# Patient Record
Sex: FEMALE | Race: AMERICAN INDIAN OR ALASKA NATIVE | NOT HISPANIC OR LATINO | URBAN - METROPOLITAN AREA
[De-identification: names, ages, dates, MRNs, and addresses within clinical notes are randomized per-mention and may not be internally consistent; named-entity substitution may affect disease eponyms.]

---

## 2017-01-13 ENCOUNTER — OFFICE VISIT (OUTPATIENT)
Dept: ENDOCRINOLOGY | Facility: CLINIC | Age: 42
End: 2017-01-13

## 2017-01-13 VITALS
HEART RATE: 86 BPM | OXYGEN SATURATION: 96 % | SYSTOLIC BLOOD PRESSURE: 147 MMHG | BODY MASS INDEX: 42.4 KG/M2 | DIASTOLIC BLOOD PRESSURE: 96 MMHG | TEMPERATURE: 98.4 F | WEIGHT: 239.3 LBS | HEIGHT: 63 IN

## 2017-01-13 DIAGNOSIS — E66.01 MORBID OBESITY DUE TO EXCESS CALORIES (H): Primary | ICD-10-CM

## 2017-01-13 ASSESSMENT — ENCOUNTER SYMPTOMS
SPUTUM PRODUCTION: 1
HEMOPTYSIS: 0
DYSPNEA ON EXERTION: 1
SHORTNESS OF BREATH: 0
WHEEZING: 0
RESPIRATORY PAIN: 0
POSTURAL DYSPNEA: 0
COUGH DISTURBING SLEEP: 1
SNORES LOUDLY: 1
COUGH: 1

## 2017-01-13 ASSESSMENT — PAIN SCALES - GENERAL: PAINLEVEL: NO PAIN (0)

## 2017-01-13 NOTE — PROGRESS NOTES
"    Return Medical Weight Management Note     Stephanie Mccarthy  MRN:  [id number]  :  1975  YAHAIRA:  2017    Dear Colleagues,    I had the pleasure of seeing your patient Stephanie Mccarthy.  She is a 40 year old female who I am continuing to see for treatment of obesity related to:Hyperlipidemia, Sleep Apnea (has CPAP and does not use), Knee Pain, GERD and Depression.    CURRENT WEIGHT:   239 lbs 4.8 oz    Wt Readings from Last 4 Encounters:   17 108.546 kg (239 lb 4.8 oz)   10/07/16 107.049 kg (236 lb)   16 104.055 kg (229 lb 6.4 oz)   12/14/15 102.74 kg (226 lb 8 oz)     Height:  5' 3\"  Body Mass Index:  Body mass index is 42.4 kg/(m^2).  Vitals:  B/P: 127/82, P: 77    Initial consult weight was 264 on 14  Weight change since last seen on 10/17/2016 is up 3 pounds.  Total loss is 25 pounds.    INTERVAL HISTORY:  Still stressed and unable to get activity. She feels med is maybe working and is helpful with food deterrence, no side effects. Even so, has not been consistently on her food plan. Feels wants today to prioritize her plan adherence rather than increasing medication.    Diet and Activity Changes Since Last Visit Reviewed With Patient 2017   I have made the following changes to my diet since my last visit: less dairy increase leafy green   With regards to my diet, I am still struggling with: energy and headaches   For breakfast, I typically eat: coffee   For lunch, I typically eat: soup bread salad   For supper, I typically eat: meat and vegatables   For snack(s), I typically eat: chips   I have made the following changes to my activity/exercise since my last visit: none   With regards to my activity/exercise, I am still struggling with: time - family balance     MEDICATIONS:   Current Outpatient Prescriptions   Medication     Phentermine-Topiramate 15-92 MG CP24     SPIRONOLACTONE PO     albuterol (PROAIR HFA, PROVENTIL HFA, VENTOLIN HFA) 108 (90 BASE) MCG/ACT inhaler     No " current facility-administered medications for this visit.       Weight Loss Medication History Reviewed With Patient 1/13/2017   Which weight loss medications are you currently taking on a regular basis?  Qysmia (phentermine/topiramate)   Are you having any side effects from the weight loss medication that we have prescribed you? No     ASSESSMENT:   Continue current plan - 3 meal/d, no snacks, reduced starches and cheese supported by current (full) dose of Qsymia. If not successful next time, may need to separate phentermine and topiramate to increase doses.    FOLLOW-UP:    12 weeks.  10/15 minutes spent on counseling and education     Sincerely,    Rober Nam MD

## 2017-01-13 NOTE — Clinical Note
"2017       RE: Stephanie Mccarthy  [address] [facility] [address]     Dear Colleague,    Thank you for referring your patient, Stephanie Mccarthy, to the MEDICAL WEIGHT MANAGEMENT at [facility]. Please see a copy of my visit note below.        Return Medical Weight Management Note     Stephanie Mccarthy  MRN:  [id number]  :  1975  YAHAIRA:  2017    Dear Colleagues,    I had the pleasure of seeing your patient Stephanie Mccarthy.  She is a 40 year old female who I am continuing to see for treatment of obesity related to:Hyperlipidemia, Sleep Apnea (has CPAP and does not use), Knee Pain, GERD and Depression.    CURRENT WEIGHT:   239 lbs 4.8 oz    Wt Readings from Last 4 Encounters:   17 108.546 kg (239 lb 4.8 oz)   10/07/16 107.049 kg (236 lb)   16 104.055 kg (229 lb 6.4 oz)   12/14/15 102.74 kg (226 lb 8 oz)     Height:  5' 3\"  Body Mass Index:  Body mass index is 42.4 kg/(m^2).  Vitals:  B/P: 127/82, P: 77    Initial consult weight was 264 on 14  Weight change since last seen on 10/17/2016 is up 3 pounds.  Total loss is 25 pounds.    INTERVAL HISTORY:  Still stressed and unable to get activity. She feels med is maybe working and is helpful with food deterrence, no side effects. Even so, has not been consistently on her food plan. Feels wants today to prioritize her plan adherence rather than increasing medication.    Diet and Activity Changes Since Last Visit Reviewed With Patient 2017   I have made the following changes to my diet since my last visit: less dairy increase leafy green   With regards to my diet, I am still struggling with: energy and headaches   For breakfast, I typically eat: coffee   For lunch, I typically eat: soup bread salad   For supper, I typically eat: meat and vegatables   For snack(s), I typically eat: chips   I have made the following changes to my activity/exercise since my last visit: none   With regards " to my activity/exercise, I am still struggling with: time - family balance     MEDICATIONS:   Current Outpatient Prescriptions   Medication     Phentermine-Topiramate 15-92 MG CP24     SPIRONOLACTONE PO     albuterol (PROAIR HFA, PROVENTIL HFA, VENTOLIN HFA) 108 (90 BASE) MCG/ACT inhaler     No current facility-administered medications for this visit.       Weight Loss Medication History Reviewed With Patient 1/13/2017   Which weight loss medications are you currently taking on a regular basis?  Qysmia (phentermine/topiramate)   Are you having any side effects from the weight loss medication that we have prescribed you? No     ASSESSMENT:   Continue current plan - 3 meal/d, no snacks, reduced starches and cheese supported by current (full) dose of Qsymia. If not successful next time, may need to separate phentermine and topiramate to increase doses.    FOLLOW-UP:    12 weeks.  10/15 minutes spent on counseling and education     Sincerely,    Rober Nam MD

## 2017-01-13 NOTE — NURSING NOTE
"Chief Complaint   Patient presents with     Clinic Care Coordination - Follow-up       Filed Vitals:    01/13/17 0923   BP: 147/96   Pulse: 86   Temp: 98.4  F (36.9  C)   TempSrc: Oral   Height: 5' 3\"   Weight: 239 lb 4.8 oz   SpO2: 96%       Body mass index is 42.4 kg/(m^2).    Felisa MACK LPN                          "

## 2017-05-02 DIAGNOSIS — E66.01 MORBID OBESITY DUE TO EXCESS CALORIES (H): ICD-10-CM

## 2017-05-02 NOTE — TELEPHONE ENCOUNTER
Qsymia  Last Written Prescription Date:  10/7/16  Last Fill Quantity: 30,   # refills: 5  Last Office Visit : 1/13/17  Future Office visit:  5/15/17    Routing refill request to provider for review/approval because:  Drug not on the [facility], [facility] or [facility] refill protocol or controlled substance

## 2017-05-08 ENCOUNTER — OFFICE VISIT (OUTPATIENT)
Dept: OPTOMETRY | Facility: CLINIC | Age: 42
End: 2017-05-08
Payer: COMMERCIAL

## 2017-05-08 DIAGNOSIS — H52.202 HYPEROPIA OF LEFT EYE WITH ASTIGMATISM: ICD-10-CM

## 2017-05-08 DIAGNOSIS — Z98.890 S/P LASIK SURGERY: ICD-10-CM

## 2017-05-08 DIAGNOSIS — H52.11 MYOPIA OF RIGHT EYE: ICD-10-CM

## 2017-05-08 DIAGNOSIS — H52.02 HYPEROPIA OF LEFT EYE WITH ASTIGMATISM: ICD-10-CM

## 2017-05-08 DIAGNOSIS — H52.4 PRESBYOPIA: Primary | ICD-10-CM

## 2017-05-08 PROCEDURE — 92004 COMPRE OPH EXAM NEW PT 1/>: CPT | Performed by: OPTOMETRIST

## 2017-05-08 PROCEDURE — 92015 DETERMINE REFRACTIVE STATE: CPT | Performed by: OPTOMETRIST

## 2017-05-08 ASSESSMENT — VISUAL ACUITY
OS_SC: 20/20
OD_SC: 20/40
METHOD: SNELLEN - LINEAR
OD_SC: 20/20
OS_SC: 20/60

## 2017-05-08 ASSESSMENT — REFRACTION_MANIFEST
OS_ADD: +1.25
OD_ADD: +1.25
OS_CYLINDER: +0.25
OD_SPHERE: -0.25
OS_SPHERE: +0.25
OD_CYLINDER: SPHERE
OS_AXIS: 180

## 2017-05-08 ASSESSMENT — SLIT LAMP EXAM - LIDS
COMMENTS: NORMAL
COMMENTS: NORMAL

## 2017-05-08 ASSESSMENT — REFRACTION_WEARINGRX
OS_SPHERE: +0.25
OS_HPRISM: 3.0
OS_HBASE: OUT
OD_HBASE: OUT
OD_CYLINDER: SPHERE
OS_CYLINDER: SPHERE
OD_HPRISM: 3.0
OD_SPHERE: PLANO

## 2017-05-08 ASSESSMENT — EXTERNAL EXAM - LEFT EYE: OS_EXAM: NORMAL

## 2017-05-08 ASSESSMENT — CONF VISUAL FIELD
OD_NORMAL: 1
OS_NORMAL: 1

## 2017-05-08 ASSESSMENT — EXTERNAL EXAM - RIGHT EYE: OD_EXAM: NORMAL

## 2017-05-08 ASSESSMENT — TONOMETRY
OD_IOP_MMHG: 16
OS_IOP_MMHG: 16
IOP_METHOD: APPLANATION

## 2017-05-08 ASSESSMENT — CUP TO DISC RATIO
OD_RATIO: 0.1
OS_RATIO: 0.1

## 2017-05-08 NOTE — PROGRESS NOTES
Chief Complaint   Patient presents with     COMPREHENSIVE EYE EXAM      Accompanied by self  Last Eye Exam: 5 years ago  Dilated Previously: Yes    What are you currently using to see?  does not use glasses or contacts       Distance Vision Acuity: Noticed gradual change in both eyes    Near Vision Acuity: Not satisfied     Eye Comfort: good  Do you use eye drops? : No  Occupation or Hobbies: software tech    Radha Kosak, Optometric [employer]          Medical, surgical and family histories reviewed and updated 5/8/2017.       OBJECTIVE: See Ophthalmology exam    ASSESSMENT:    ICD-10-CM    1. Presbyopia H52.4 EYE EXAM (SIMPLE-NONBILLABLE)     REFRACTION   2. Myopia of right eye H52.11 EYE EXAM (SIMPLE-NONBILLABLE)     REFRACTION   3. Hyperopia of left eye with astigmatism H52.02 EYE EXAM (SIMPLE-NONBILLABLE)    H52.202 REFRACTION   4. S/P LASIK SURGERY 2006 Z98.890 EYE EXAM (SIMPLE-NONBILLABLE)      PLAN:   A final glasses prescription was given.  Allow time for adaptation to lenses.  You have the option of wearing reading glasses that would allow you to see up close (anytime you look far away, you have to take them off) or you can get a progressive lens or a lined bifocal, these would allow you to see clearly in the distance and close up.    Your ocular health was normal today, therefore no treatment other than glasses is recommended.    Return to clinic in 1 year for your next eye exam.      Corry Garcia O.D  [address]. [place]  [address]    [phone]

## 2017-05-08 NOTE — PATIENT INSTRUCTIONS
A final glasses prescription was given.  Allow time for adaptation to lenses.  You have the option of wearing reading glasses that would allow you to see up close (anytime you look far away, you have to take them off) or you can get a progressive lens or a lined bifocal, these would allow you to see clearly in the distance and close up.    Your ocular health was normal today, therefore no treatment other than glasses is recommended.    Return to clinic in 1 year for your next eye exam.      Corry Garcia O.D  [address]. [place]  [address]    [phone]

## 2017-05-08 NOTE — MR AVS SNAPSHOT
After Visit Summary   5/8/2017    Stephanie Mccarthy    MRN: [id number]           Patient Information     Date Of Birth          1975        Visit Information        Provider Department      5/8/2017 9:30 AM Corry Garcia OD [facility]        Today's Diagnoses     Presbyopia    -  1    Myopia of right eye        Hyperopia of left eye with astigmatism        S/P LASIK SURGERY 2006          Care Instructions    A final glasses prescription was given.  Allow time for adaptation to lenses.  You have the option of wearing reading glasses that would allow you to see up close (anytime you look far away, you have to take them off) or you can get a progressive lens or a lined bifocal, these would allow you to see clearly in the distance and close up.    Your ocular health was normal today, therefore no treatment other than glasses is recommended.    Return to clinic in 1 year for your next eye exam.      Corry Garcia O.D  [address] [place]  [address] [phone]                       Follow-ups after your visit        Follow-up notes from your care team     Return in about 1 year (around 5/8/2018) for Eye Exam.      Your next 10 appointments already scheduled     May 15, 2017  8:00 AM CDT   (Arrive by 7:45 AM)   Return Visit with Rober Nam MD   [facility] Medical Weight Management ([facility])    [address] [place] [id number] [phone]              Who to contact     If you have questions or need follow up information about today's clinic visit or your schedule please contact [facility] directly at [phone].  Normal or non-critical lab and imaging results will be communicated to you by MyChart, letter or phone within 4 business days after the clinic has received the results. If you do not hear from us within 7 days, please contact the clinic through  Fuzzt or phone. If you have a critical or abnormal lab result, we will notify you by phone as soon as possible.  Submit refill requests through Kera or call your pharmacy and they will forward the refill request to us. Please allow 3 business days for your refill to be completed.          Additional Information About Your Visit        Motion Displayshart Information     Kera gives you secure access to your electronic health record. If you see a primary care provider, you can also send messages to your care team and make appointments. If you have questions, please call your primary care clinic.  If you do not have a primary care provider, please call [phone] and they will assist you.        Care EveryWhere ID     This is your Care EveryWhere ID. This could be used by other organizations to access your [place] medical records  [id number]         Blood Pressure from Last 3 Encounters:   01/13/17 (!) 147/96   10/07/16 (!) 153/99   02/24/16 123/83    Weight from Last 3 Encounters:   01/13/17 108.5 kg (239 lb 4.8 oz)   10/07/16 107 kg (236 lb)   02/24/16 104.1 kg (229 lb 6.4 oz)              We Performed the Following     EYE EXAM (SIMPLE-NONBILLABLE)     REFRACTION        Primary Care Provider Office Phone #    [facility] [phone]       No address on file        Thank you!     Thank you for choosing [facility] [place]  for your care. Our goal is always to provide you with excellent care. Hearing back from our patients is one way we can continue to improve our services. Please take a few minutes to complete the written survey that you may receive in the mail after your visit with us. Thank you!             Your Updated Medication List - Protect others around you: Learn how to safely use, store and throw away your medicines at www.disposemymeds.org.          This list is accurate as of: 5/8/17 10:40 AM.  Always use your most recent med list.                   Brand Name Dispense Instructions for  use    albuterol 108 (90 BASE) MCG/ACT Inhaler    PROAIR HFA/PROVENTIL HFA/VENTOLIN HFA    1 Inhaler    Inhale 2 puffs into the lungs every 6 hours as needed for shortness of breath / dyspnea or wheezing       Phentermine-Topiramate 15-92 MG Cp24     30 capsule    Take 1 capsule by mouth daily       SPIRONOLACTONE PO      Take 100 mg by mouth daily

## 2017-05-15 ENCOUNTER — OFFICE VISIT (OUTPATIENT)
Dept: ENDOCRINOLOGY | Facility: CLINIC | Age: 42
End: 2017-05-15

## 2017-05-15 VITALS
WEIGHT: 235.1 LBS | HEART RATE: 85 BPM | OXYGEN SATURATION: 100 % | BODY MASS INDEX: 41.66 KG/M2 | DIASTOLIC BLOOD PRESSURE: 85 MMHG | TEMPERATURE: 98.9 F | SYSTOLIC BLOOD PRESSURE: 142 MMHG | HEIGHT: 63 IN

## 2017-05-15 DIAGNOSIS — E66.01 MORBID OBESITY DUE TO EXCESS CALORIES (H): ICD-10-CM

## 2017-05-15 ASSESSMENT — PAIN SCALES - GENERAL: PAINLEVEL: NO PAIN (0)

## 2017-05-15 NOTE — MR AVS SNAPSHOT
After Visit Summary   5/15/2017    Stephanie Mccarthy    MRN: [id number]           Patient Information     Date Of Birth          1975        Visit Information        Provider Department      5/15/2017 8:00 AM Rober Nam MD [facility] Medical Weight Management        Today's Diagnoses     Morbid obesity due to excess calories (H)           Follow-ups after your visit        Follow-up notes from your care team     Return in about 3 months (around 8/15/2017).      Who to contact     Please call your clinic at [phone] to:    Ask questions about your health    Make or cancel appointments    Discuss your medicines    Learn about your test results    Speak to your doctor   If you have compliments or concerns about an experience at your clinic, or if you wish to file a complaint, please contact [facility] Patient Relations at [phone] or email us at [email or web address]         Additional Information About Your Visit        MyChart Information     Orggert gives you secure access to your electronic health record. If you see a primary care provider, you can also send messages to your care team and make appointments. If you have questions, please call your primary care clinic.  If you do not have a primary care provider, please call [phone] and they will assist you.      RadioRx is an electronic gateway that provides easy, online access to your medical records. With RadioRx, you can request a clinic appointment, read your test results, renew a prescription or communicate with your care team.     To access your existing account, please contact your [facility] Clinic or call [phone] for assistance.        Care EveryWhere ID     This is your Care EveryWhere ID. This could be used by other organizations to access your [place] medical records  [id number]        Your Vitals Were     Pulse Temperature Height Pulse  "Oximetry BMI (Body Mass Index)       85 98.9  F (37.2  C) 1.6 m (5' 3\") 100% 41.65 kg/m2        Blood Pressure from Last 3 Encounters:   05/15/17 142/85   01/13/17 (!) 147/96   10/07/16 (!) 153/99    Weight from Last 3 Encounters:   05/15/17 106.6 kg (235 lb 1.6 oz)   01/13/17 108.5 kg (239 lb 4.8 oz)   10/07/16 107 kg (236 lb)              Today, you had the following     No orders found for display         Today's Medication Changes          These changes are accurate as of: 5/15/17  9:06 AM.  If you have any questions, ask your nurse or doctor.               Stop taking these medicines if you haven't already. Please contact your care team if you have questions.     SPIRONOLACTONE PO   Stopped by:  Rober Nam MD                Where to get your medicines      Some of these will need a paper prescription and others can be bought over the counter.  Ask your nurse if you have questions.     Bring a paper prescription for each of these medications     Phentermine-Topiramate 15-92 MG Cp24                Primary Care Provider Office Phone #    [facility] [phone]       No address on file        Thank you!     Thank you for choosing [facility] WEIGHT MANAGEMENT  for your care. Our goal is always to provide you with excellent care. Hearing back from our patients is one way we can continue to improve our services. Please take a few minutes to complete the written survey that you may receive in the mail after your visit with us. Thank you!             Your Updated Medication List - Protect others around you: Learn how to safely use, store and throw away your medicines at www.disposemymeds.org.          This list is accurate as of: 5/15/17  9:06 AM.  Always use your most recent med list.                   Brand Name Dispense Instructions for use    albuterol 108 (90 BASE) MCG/ACT Inhaler    PROAIR HFA/PROVENTIL HFA/VENTOLIN HFA    1 Inhaler    Inhale 2 puffs into the lungs every 6 hours " as needed for shortness of breath / dyspnea or wheezing       Phentermine-Topiramate 15-92 MG Cp24     30 capsule    Take 1 capsule by mouth daily

## 2017-05-15 NOTE — PROGRESS NOTES
"    Return Medical Weight Management Note     Stephanie Mccarthy  MRN:  [id number]  :  1975  YAHAIRA:  5/15/2017    Dear Colleagues,    I had the pleasure of seeing your patient Stephanie Mccarthy.  She is a 40 year old female who I am continuing to see for treatment of obesity related to:Hyperlipidemia, Sleep Apnea (has CPAP and does not use), Knee Pain, GERD and Depression.    CURRENT WEIGHT:   235 lbs 1.6 oz    Wt Readings from Last 4 Encounters:   05/15/17 235 lb 1.6 oz   17 239 lb 4.8 oz   10/07/16 236 lb   16 229 lb 6.4 oz     Height:  5' 3\"  Body Mass Index:  Body mass index is 41.65 kg/(m^2).  Vitals:  B/P: 127/82, P: 77    Initial consult weight was 264 on 14  Weight change since last seen on 2017 is down 4 pounds.  Total loss is 29 pounds.    INTERVAL HISTORY:  Still unable to get activity. She feels med is maybe working and is helpful with food deterrence, no side effects. Even so, has not been consistently on her food plan. Feels wants today to prioritize her plan adherence rather than increasing medication.    Diet and Activity Changes Since Last Visit Reviewed With Patient 2017   I have made the following changes to my diet since my last visit: Increased water intake   With regards to my diet, I am still struggling with: Lack of exercise   For breakfast, I typically eat: Coffee   For lunch, I typically eat: meat, starch, salad   For supper, I typically eat: meat & startch   For snack(s), I typically eat: sunflower seeds   I have made the following changes to my activity/exercise since my last visit: none   With regards to my activity/exercise, I am still struggling with: time, stress     MEDICATIONS:   Current Outpatient Prescriptions   Medication     Phentermine-Topiramate 15-92 MG CP24     albuterol (PROAIR HFA, PROVENTIL HFA, VENTOLIN HFA) 108 (90 BASE) MCG/ACT inhaler     No current facility-administered medications for this visit.        Weight Loss Medication History " Reviewed With Patient 5/1/2017   Which weight loss medications are you currently taking on a regular basis?  Qysmia (phentermine/topiramate)   Are you having any side effects from the weight loss medication that we have prescribed you? No     ASSESSMENT:   Again emphasized reduction of starches (which she has not yet dose and seemed to find surprising) and continue current plan - 3 meal/d, no snacks, reduced starches and cheese supported by current (full) dose of Qsymia.     FOLLOW-UP:    12 weeks.  10/15 minutes spent on counseling and education     Sincerely,    Rober Nam MD

## 2017-05-15 NOTE — LETTER
Date:May 16, 2017      Patient was self referred, no letter generated. Do not send.        [facility] Physicians Health Information

## 2017-05-15 NOTE — NURSING NOTE
"Chief Complaint   Patient presents with     Weight Problem     RMWM       Vitals:    05/15/17 0808   BP: 142/85   BP Location: Left arm   Patient Position: Chair   Cuff Size: Adult Large   Pulse: 85   Temp: 98.9  F (37.2  C)   SpO2: 100%   Weight: 235 lb 1.6 oz   Height: 5' 3\"       Body mass index is 41.65 kg/(m^2).    Nilda Alexandra                          "

## 2017-05-15 NOTE — LETTER
"5/15/2017       RE: Stephanie Mccarthy  [address] [facility] [address]     Dear Colleague,    Thank you for referring your patient, Stephanie Mccarthy, to the [facility] MEDICAL WEIGHT MANAGEMENT at [facility]. Please see a copy of my visit note below.        Return Medical Weight Management Note     Stephanie Mccarthy  MRN:  [id number]  :  1975  YAHAIRA:  5/15/2017    Dear Colleagues,    I had the pleasure of seeing your patient Stephanie Mccarthy.  She is a 40 year old female who I am continuing to see for treatment of obesity related to:Hyperlipidemia, Sleep Apnea (has CPAP and does not use), Knee Pain, GERD and Depression.    CURRENT WEIGHT:   235 lbs 1.6 oz    Wt Readings from Last 4 Encounters:   05/15/17 235 lb 1.6 oz   17 239 lb 4.8 oz   10/07/16 236 lb   16 229 lb 6.4 oz     Height:  5' 3\"  Body Mass Index:  Body mass index is 41.65 kg/(m^2).  Vitals:  B/P: 127/82, P: 77    Initial consult weight was 264 on 14  Weight change since last seen on 2017 is down 4 pounds.  Total loss is 29 pounds.    INTERVAL HISTORY:  Still unable to get activity. She feels med is maybe working and is helpful with food deterrence, no side effects. Even so, has not been consistently on her food plan. Feels wants today to prioritize her plan adherence rather than increasing medication.    Diet and Activity Changes Since Last Visit Reviewed With Patient 2017   I have made the following changes to my diet since my last visit: Increased water intake   With regards to my diet, I am still struggling with: Lack of exercise   For breakfast, I typically eat: Coffee   For lunch, I typically eat: meat, starch, salad   For supper, I typically eat: meat & startch   For snack(s), I typically eat: sunflower seeds   I have made the following changes to my activity/exercise since my last visit: none   With regards to my activity/exercise, I am still struggling with: " time, stress     MEDICATIONS:   Current Outpatient Prescriptions   Medication     Phentermine-Topiramate 15-92 MG CP24     albuterol (PROAIR HFA, PROVENTIL HFA, VENTOLIN HFA) 108 (90 BASE) MCG/ACT inhaler     No current facility-administered medications for this visit.        Weight Loss Medication History Reviewed With Patient 5/1/2017   Which weight loss medications are you currently taking on a regular basis?  Qysmia (phentermine/topiramate)   Are you having any side effects from the weight loss medication that we have prescribed you? No     ASSESSMENT:   Again emphasized reduction of starches (which she has not yet dose and seemed to find surprising) and continue current plan - 3 meal/d, no snacks, reduced starches and cheese supported by current (full) dose of Qsymia.     FOLLOW-UP:    12 weeks.  10/15 minutes spent on counseling and education     Sincerely,    Rober Nam MD    Again, thank you for allowing me to participate in the care of your patient.      Sincerely,    Rober Nam MD

## 2017-12-12 DIAGNOSIS — E66.01 MORBID OBESITY DUE TO EXCESS CALORIES (H): ICD-10-CM

## 2017-12-12 NOTE — TELEPHONE ENCOUNTER
Last Written Prescription Date:  5/15/17  Last Fill Quantity: 30,   # refills: 5  Last Office Visit : 5/15/17  Future Office visit:  1/22/18  Routing refill request to provider for review/approval because:  Drug not on refill protocol or controlled substance

## 2018-01-29 ENCOUNTER — OFFICE VISIT (OUTPATIENT)
Dept: ENDOCRINOLOGY | Facility: CLINIC | Age: 43
End: 2018-01-29
Payer: COMMERCIAL

## 2018-01-29 VITALS
BODY MASS INDEX: 39.92 KG/M2 | DIASTOLIC BLOOD PRESSURE: 90 MMHG | OXYGEN SATURATION: 100 % | SYSTOLIC BLOOD PRESSURE: 152 MMHG | HEART RATE: 90 BPM | WEIGHT: 225.3 LBS | HEIGHT: 63 IN

## 2018-01-29 DIAGNOSIS — E66.01 MORBID OBESITY DUE TO EXCESS CALORIES (H): ICD-10-CM

## 2018-01-29 ASSESSMENT — ENCOUNTER SYMPTOMS
SKIN CHANGES: 0
POOR WOUND HEALING: 0
NAIL CHANGES: 0

## 2018-01-29 ASSESSMENT — PAIN SCALES - GENERAL: PAINLEVEL: NO PAIN (0)

## 2018-01-29 NOTE — PATIENT INSTRUCTIONS
Continue Qsymia as planned.  Check BP at any  pharmacy or clinic.  Follow up with Dr. Nam in 3-4 months.  Maintain a 1200 calorie diet as discussed.

## 2018-01-29 NOTE — MR AVS SNAPSHOT
After Visit Summary   1/29/2018    Stephanie Mccarthy    MRN: [id number]           Patient Information     Date Of Birth          1975        Visit Information        Provider Department      1/29/2018 8:15 AM Rober Nam MD [facility] Medical Weight Management        Today's Diagnoses     Morbid obesity due to excess calories (H)          Care Instructions    Continue Qsymia as planned.  Check BP at any  pharmacy or clinic.  Follow up with Dr. Nam in 3-4 months.  Maintain a 1200 calorie diet as discussed.            Follow-ups after your visit        Who to contact     Please call your clinic at [phone] to:    Ask questions about your health    Make or cancel appointments    Discuss your medicines    Learn about your test results    Speak to your doctor   If you have compliments or concerns about an experience at your clinic, or if you wish to file a complaint, please contact [facility] Patient Relations at [phone] or email us at [email or web address]         Additional Information About Your Visit        [employer] Information     Quire gives you secure access to your electronic health record. If you see a primary care provider, you can also send messages to your care team and make appointments. If you have questions, please call your primary care clinic.  If you do not have a primary care provider, please call [phone] and they will assist you.      Quire is an electronic gateway that provides easy, online access to your medical records. With Quire, you can request a clinic appointment, read your test results, renew a prescription or communicate with your care team.     To access your existing account, please contact your [facility] or call [phone] for assistance.        Care EveryWhere ID     This is your Care EveryWhere ID. This could be used by other organizations to access your  "[place] medical records  [id number]        Your Vitals Were     Pulse Height Pulse Oximetry BMI (Body Mass Index)          90 1.6 m (5' 3\") 100% 39.91 kg/m2         Blood Pressure from Last 3 Encounters:   01/29/18 152/90   05/15/17 142/85   01/13/17 (!) 147/96    Weight from Last 3 Encounters:   01/29/18 102.2 kg (225 lb 4.8 oz)   05/15/17 106.6 kg (235 lb 1.6 oz)   01/13/17 108.5 kg (239 lb 4.8 oz)              Today, you had the following     No orders found for display         Where to get your medicines      Some of these will need a paper prescription and others can be bought over the counter.  Ask your nurse if you have questions.     Bring a paper prescription for each of these medications     Phentermine-Topiramate 15-92 MG Cp24          Primary Care Provider Office Phone # Fax #    [facility] [phone] [address] [facility] [address]        Equal Access to Services     [facility]: Hadii aad ku hadasho Soomaali, waaxda luqadaha, qaybta kaalmada adedayneyahansel, tray ortega . So [facility] [phone].    ATENCIÓN: Si habla español, tiene a levine disposición servicios gratuitos de asistencia lingüística. Llame al [phone].    We comply with applicable federal civil rights laws and [place] laws. We do not discriminate on the basis of race, color, national origin, age, disability, sex, sexual orientation, or gender identity.            Thank you!     Thank you for choosing [facility] MEDICAL WEIGHT MANAGEMENT  for your care. Our goal is always to provide you with excellent care. Hearing back from our patients is one way we can continue to improve our services. Please take a few minutes to complete the written survey that you may receive in the mail after your visit with us. Thank you!             Your Updated Medication List - Protect others around you: Learn how to safely use, store and throw away your medicines at www.disposemymeds.org.    "       This list is accurate as of 1/29/18  9:24 AM.  Always use your most recent med list.                   Brand Name Dispense Instructions for use Diagnosis    albuterol 108 (90 BASE) MCG/ACT Inhaler    PROAIR HFA/PROVENTIL HFA/VENTOLIN HFA    1 Inhaler    Inhale 2 puffs into the lungs every 6 hours as needed for shortness of breath / dyspnea or wheezing    Acute bronchitis       Phentermine-Topiramate 15-92 MG Cp24     30 capsule    Take 1 capsule by mouth daily    Morbid obesity due to excess calories (H)

## 2018-01-29 NOTE — LETTER
"2018       RE: Stephanie Mccarthy  [address] [address]     Dear Colleague,    Thank you for referring your patient, Stephanie Mccarthy, to the [facility] MEDICAL WEIGHT MANAGEMENT at [facility]. Please see a copy of my visit note below.        Return Medical Weight Management Note     Stephanie Mccarthy  MRN:  [id number]  :  1975  YAHAIRA:  2018    Dear [facility],    I had the pleasure of seeing your patient Stephanie Mccarthy.  She is a 42 year old female who I am continuing to see for treatment of obesity.    CURRENT WEIGHT:   225 lbs 4.8 oz    Wt Readings from Last 4 Encounters:   18 102.2 kg (225 lb 4.8 oz)   05/15/17 106.6 kg (235 lb 1.6 oz)   17 108.5 kg (239 lb 4.8 oz)   10/07/16 107 kg (236 lb)       Height:  5' 3\"  Body Mass Index:  Body mass index is 39.91 kg/(m^2).  Vitals:  B/P: 170/109, P: 90    Initial consult weight was 264 lbs on .  Weight change since last visit 2017 10 pounds.   Total loss is 39 pounds.    INTERVAL HISTORY:  Busy with work and personal life.   Doesn't do calorie count.  Dinner usually meat and noodles.  No exercise.  Sedentary at work.   Tolerating meds.   Has tried weight-watchers and other calorie counting diets in the past but had struggled with 1200 kcal limits but she was not on any appetite suppressant.  She has been able to cut out snacks and high calorie food from her diet.     Diet and Activity Changes Since Last Visit Reviewed With Patient 2018   I have made the following changes to my diet since my last visit: eat less   With regards to my diet, I am still struggling with: exercise   For breakfast, I typically eat: coffee   For lunch, I typically eat: salad with a protein   For supper, I typically eat: protien and a startch   For snack(s), I typically eat: nothing   I have made the following changes to my activity/exercise since my last visit: i park further away " "  With regards to my activity/exercise, I am still struggling with: time vv       MEDICATIONS:   Current Outpatient Prescriptions   Medication     Phentermine-Topiramate 15-92 MG CP24     albuterol (PROAIR HFA, PROVENTIL HFA, VENTOLIN HFA) 108 (90 BASE) MCG/ACT inhaler     No current facility-administered medications for this visit.        Weight Loss Medication History Reviewed With Patient 1/29/2018   Which weight loss medications are you currently taking on a regular basis?  Qysmia (phentermine/topiramate)   Are you having any side effects from the weight loss medication that we have prescribed you? No     /90  Pulse 90  Ht 1.6 m (5' 3\")  Wt 102.2 kg (225 lb 4.8 oz)  SpO2 100%  BMI 39.91 kg/m2  NAD  Comfortable on RA  Alert and oriented  Normal gait and grossly non-focal exam    ASSESSMENT:   43 y/o morbidly obese F without much PMH presents for weight management.  Doesn't do calorie counting and has a high carb diet.     - Start at 1200 kcal diet.  She is 225 lbs and wants to be 175 lbs  - Continue phetermine/topiramate  - Start exercising    HTN  - Will recheck BP at work and will call the clinic.  If consistently above 130/80, will start a BP medication.  Possible options are amlodipine, lisinopril, or chlorthalidone.   - Patient wants to continue phentermine/topiramate.     FOLLOW-UP:    3 months    Patient was seen and discussed with Dr. Nam.     Kandace Sequeira MD     Attending physician addendum.   Pt was seen and evaluated with the medical resident. Clinical data was reviewed and discussed with the patient and with the resident. The note above reflects our history and findings, and the evaluation and plan reflects my clinical opinion.   10 of 15 minutes were spent in counseling, education, and discussion related to the above issues.        Again, thank you for allowing me to participate in the care of your patient.      Sincerely,    Rober Nam MD      "

## 2018-01-29 NOTE — NURSING NOTE
"(   Chief Complaint   Patient presents with     RECHECK     f/u     )    ( Weight: 102.2 kg (225 lb 4.8 oz) )  ( Height: 160 cm (5' 3\") )  ( BMI (Calculated): 39.99 )  (   )  (   )  (   )  (   )  (   )  (   )    ( BP: (!) 170/109 )  (   )  (   )  (   )  ( Pulse: 90 )  (   )  ( SpO2: 100 % )    (   Patient Active Problem List   Diagnosis     Mild major depression (H)     Anxiety     Acne     CARDIOVASCULAR SCREENING; LDL GOAL LESS THAN 160     S/P LASIK SURGERY 2006     IUD (intrauterine device) in place, paragard 11/2010     Pseudotumor cerebri - resolved     Obesity     Knee pain     Hx gestational diabetes     Chondromalacia of patella     Obesity, morbid, BMI 40.0-49.9 (H)    )  (   Current Outpatient Prescriptions   Medication Sig Dispense Refill     Phentermine-Topiramate 15-92 MG CP24 Take 1 capsule by mouth daily 30 capsule 1     albuterol (PROAIR HFA, PROVENTIL HFA, VENTOLIN HFA) 108 (90 BASE) MCG/ACT inhaler Inhale 2 puffs into the lungs every 6 hours as needed for shortness of breath / dyspnea or wheezing 1 Inhaler 0    )  ( Diabetes Eval:    )    ( Pain Eval:  No Pain (0) )    ( Wound Eval:       )    (   History   Smoking Status     Current Some Day Smoker     Packs/day: 0.10     Types: Cigarettes     Last attempt to quit: 10/1/2009   Smokeless Tobacco     Never Used     Comment: Smoking household    )    ( Signed By:  Dajuan Stinson; January 29, 2018; 8:35 AM )    "

## 2018-01-29 NOTE — PROGRESS NOTES
"    Return Medical Weight Management Note     Stephanie Mccarthy  MRN:  [id number]  :  1975  YAHAIRA:  2018    Dear [facility],    I had the pleasure of seeing your patient Stephanie Mccarthy.  She is a 42 year old female who I am continuing to see for treatment of obesity.    CURRENT WEIGHT:   225 lbs 4.8 oz    Wt Readings from Last 4 Encounters:   18 102.2 kg (225 lb 4.8 oz)   05/15/17 106.6 kg (235 lb 1.6 oz)   17 108.5 kg (239 lb 4.8 oz)   10/07/16 107 kg (236 lb)       Height:  5' 3\"  Body Mass Index:  Body mass index is 39.91 kg/(m^2).  Vitals:  B/P: 170/109, P: 90    Initial consult weight was 264 lbs on .  Weight change since last visit 2017 10 pounds.   Total loss is 39 pounds.    INTERVAL HISTORY:  Busy with work and personal life.   Doesn't do calorie count.  Dinner usually meat and noodles.  No exercise.  Sedentary at work.   Tolerating meds.   Has tried weight-watchers and other calorie counting diets in the past but had struggled with 1200 kcal limits but she was not on any appetite suppressant.  She has been able to cut out snacks and high calorie food from her diet.     Diet and Activity Changes Since Last Visit Reviewed With Patient 2018   I have made the following changes to my diet since my last visit: eat less   With regards to my diet, I am still struggling with: exercise   For breakfast, I typically eat: coffee   For lunch, I typically eat: salad with a protein   For supper, I typically eat: protien and a startch   For snack(s), I typically eat: nothing   I have made the following changes to my activity/exercise since my last visit: i park further away   With regards to my activity/exercise, I am still struggling with: time vv       MEDICATIONS:   Current Outpatient Prescriptions   Medication     Phentermine-Topiramate 15-92 MG CP24     albuterol (PROAIR HFA, PROVENTIL HFA, VENTOLIN HFA) 108 (90 BASE) MCG/ACT inhaler     No current facility-administered " "medications for this visit.        Weight Loss Medication History Reviewed With Patient 1/29/2018   Which weight loss medications are you currently taking on a regular basis?  Qysmia (phentermine/topiramate)   Are you having any side effects from the weight loss medication that we have prescribed you? No     /90  Pulse 90  Ht 1.6 m (5' 3\")  Wt 102.2 kg (225 lb 4.8 oz)  SpO2 100%  BMI 39.91 kg/m2  NAD  Comfortable on RA  Alert and oriented  Normal gait and grossly non-focal exam    ASSESSMENT:   43 y/o morbidly obese F without much PMH presents for weight management.  Doesn't do calorie counting and has a high carb diet.     - Start at 1200 kcal diet.  She is 225 lbs and wants to be 175 lbs  - Continue phetermine/topiramate  - Start exercising    HTN  - Will recheck BP at work and will call the clinic.  If consistently above 130/80, will start a BP medication.  Possible options are amlodipine, lisinopril, or chlorthalidone.   - Patient wants to continue phentermine/topiramate.     FOLLOW-UP:    3 months    Patient was seen and discussed with Dr. Nam.     Kandace Sequeira MD     Attending physician addendum.   Pt was seen and evaluated with the medical resident. Clinical data was reviewed and discussed with the patient and with the resident. The note above reflects our history and findings, and the evaluation and plan reflects my clinical opinion.   10 of 15 minutes were spent in counseling, education, and discussion related to the above issues.      "

## 2018-01-29 NOTE — LETTER
Date:February 6, 2018      Patient was self referred, no letter generated. Do not send.        [facility] Health Information

## 2018-08-29 DIAGNOSIS — E66.01 MORBID OBESITY DUE TO EXCESS CALORIES (H): ICD-10-CM

## 2018-08-29 NOTE — TELEPHONE ENCOUNTER
Patient requesting refill on Qsymia. Patient has appointment with Dr. Nam scheduled in December. Will route for sign off.

## 2018-08-29 NOTE — TELEPHONE ENCOUNTER
Reason for call:  Medication   If this is a refill request, has the caller requested the refill from the pharmacy already? No  Will the patient be using a [facility]? No  Name of the pharmacy and phone number for the current request: [facility]     Name of the medication requested: PHENTERMINE    Other request: N/A    Phone number to reach patient:  Cell number on file:    Telephone Information:   Mobile [phone]       Best Time:  ANYTIME    Can we leave a detailed message on this number?  YES

## 2018-08-30 ENCOUNTER — TELEPHONE (OUTPATIENT)
Dept: ENDOCRINOLOGY | Facility: CLINIC | Age: 43
End: 2018-08-30

## 2018-12-12 ENCOUNTER — TRANSFERRED RECORDS (OUTPATIENT)
Dept: HEALTH INFORMATION MANAGEMENT | Facility: CLINIC | Age: 43
End: 2018-12-12

## 2018-12-18 ENCOUNTER — PATIENT OUTREACH (OUTPATIENT)
Dept: CARE COORDINATION | Facility: CLINIC | Age: 43
End: 2018-12-18

## 2018-12-24 ENCOUNTER — OFFICE VISIT (OUTPATIENT)
Dept: ENDOCRINOLOGY | Facility: CLINIC | Age: 43
End: 2018-12-24
Payer: COMMERCIAL

## 2018-12-24 VITALS
HEART RATE: 81 BPM | SYSTOLIC BLOOD PRESSURE: 142 MMHG | DIASTOLIC BLOOD PRESSURE: 91 MMHG | WEIGHT: 228.7 LBS | BODY MASS INDEX: 40.52 KG/M2 | OXYGEN SATURATION: 100 % | HEIGHT: 63 IN

## 2018-12-24 DIAGNOSIS — E66.01 MORBID OBESITY DUE TO EXCESS CALORIES (H): ICD-10-CM

## 2018-12-24 ASSESSMENT — MIFFLIN-ST. JEOR: SCORE: 1661.51

## 2018-12-24 ASSESSMENT — PATIENT HEALTH QUESTIONNAIRE - PHQ9: SUM OF ALL RESPONSES TO PHQ QUESTIONS 1-9: 4

## 2018-12-24 NOTE — PROGRESS NOTES
"    Return Medical Weight Management Note     Stephanie Mccarthy  MRN:  [id number]  :  1975  YAHAIRA:  2018    Dear [facility],    I had the pleasure of seeing your patient Stephanie Mccarthy.  She is a 42 year old female who I am continuing to see for treatment of obesity.    CURRENT WEIGHT:   228 lbs 11.2 oz    Wt Readings from Last 4 Encounters:   18 103.7 kg (228 lb 11.2 oz)   18 102.2 kg (225 lb 4.8 oz)   05/15/17 106.6 kg (235 lb 1.6 oz)   17 108.5 kg (239 lb 4.8 oz)       Height:  5' 3\"  Body Mass Index:  Body mass index is 40.51 kg/m .  Vitals:  B/P: 170/109, P: 90    Initial consult weight was 264 lbs on .  Weight change since last visit 2018 is up 3 pounds.   Total loss is 36 pounds.    INTERVAL HISTORY:  Busy with work and personal life.   Doesn't do calorie count.  Dinner usually meat and noodles.  No exercise.  Sedentary at work.   Tolerating meds.   Has tried weight-watchers and other calorie counting diets in the past but had struggled with 1200 kcal limits but she was not on any appetite suppressant.  She has been able to cut out snacks and high calorie food from her diet.     Diet and Activity Changes Since Last Visit Reviewed With Patient 2018   I have made the following changes to my diet since my last visit: less often, however seems larger meals   With regards to my diet, I am still struggling with: time and sleep   For breakfast, I typically eat: -   For lunch, I typically eat: -   For supper, I typically eat: -   For snack(s), I typically eat: -   I have made the following changes to my activity/exercise since my last visit: less sleep   With regards to my activity/exercise, I am still struggling with: my family is in a big transition       MEDICATIONS:   Current Outpatient Medications   Medication     albuterol (PROAIR HFA, PROVENTIL HFA, VENTOLIN HFA) 108 (90 BASE) MCG/ACT inhaler     Phentermine-Topiramate 15-92 MG CP24     No current " "facility-administered medications for this visit.        Weight Loss Medication History Reviewed With Patient 12/24/2018   Which weight loss medications are you currently taking on a regular basis?  Qysmia (phentermine/topiramate)   Are you having any side effects from the weight loss medication that we have prescribed you? No     BP (!) 142/91   Pulse 81   Ht 1.6 m (5' 3\")   Wt 103.7 kg (228 lb 11.2 oz)   SpO2 100%   BMI 40.51 kg/m      ASSESSMENT:   Re-focus on food plan and continue phentermine/topiramate.     FOLLOW-UP:    3 months    Rober Nam MD   "

## 2018-12-24 NOTE — LETTER
"2018       RE: Stephanie Mccarthy  [address] [address] [facility] [address]     Dear Colleague,    Thank you for referring your patient, Stephanie Mccarthy, to the [facility] MEDICAL WEIGHT MANAGEMENT at [facility]. Please see a copy of my visit note below.        Return Medical Weight Management Note     Stephanie Mccarthy  MRN:  [id number]  :  1975  YAHAIRA:  2018    Dear [facility],    I had the pleasure of seeing your patient Stephanie Mccarthy.  She is a 42 year old female who I am continuing to see for treatment of obesity.    CURRENT WEIGHT:   228 lbs 11.2 oz    Wt Readings from Last 4 Encounters:   18 103.7 kg (228 lb 11.2 oz)   18 102.2 kg (225 lb 4.8 oz)   05/15/17 106.6 kg (235 lb 1.6 oz)   17 108.5 kg (239 lb 4.8 oz)       Height:  5' 3\"  Body Mass Index:  Body mass index is 40.51 kg/m .  Vitals:  B/P: 170/109, P: 90    Initial consult weight was 264 lbs on .  Weight change since last visit 2018 is up 3 pounds.   Total loss is 36 pounds.    INTERVAL HISTORY:  Busy with work and personal life.   Doesn't do calorie count.  Dinner usually meat and noodles.  No exercise.  Sedentary at work.   Tolerating meds.   Has tried weight-watchers and other calorie counting diets in the past but had struggled with 1200 kcal limits but she was not on any appetite suppressant.  She has been able to cut out snacks and high calorie food from her diet.     Diet and Activity Changes Since Last Visit Reviewed With Patient 2018   I have made the following changes to my diet since my last visit: less often, however seems larger meals   With regards to my diet, I am still struggling with: time and sleep   For breakfast, I typically eat: -   For lunch, I typically eat: -   For supper, I typically eat: -   For snack(s), I typically eat: -   I have made the following changes to my activity/exercise since my last visit: less sleep   With " "regards to my activity/exercise, I am still struggling with: my family is in a big transition       MEDICATIONS:   Current Outpatient Medications   Medication     albuterol (PROAIR HFA, PROVENTIL HFA, VENTOLIN HFA) 108 (90 BASE) MCG/ACT inhaler     Phentermine-Topiramate 15-92 MG CP24     No current facility-administered medications for this visit.        Weight Loss Medication History Reviewed With Patient 12/24/2018   Which weight loss medications are you currently taking on a regular basis?  Qysmia (phentermine/topiramate)   Are you having any side effects from the weight loss medication that we have prescribed you? No     BP (!) 142/91   Pulse 81   Ht 1.6 m (5' 3\")   Wt 103.7 kg (228 lb 11.2 oz)   SpO2 100%   BMI 40.51 kg/m       ASSESSMENT:   Re-focus on food plan and continue phentermine/topiramate.     FOLLOW-UP:    3 months    Rober Nam MD       "

## 2018-12-24 NOTE — NURSING NOTE
"  Chief Complaint   Patient presents with     Weight Problem     RMWM     Vitals:    12/24/18 1126   BP: (!) 142/91   Pulse: 81   SpO2: 100%   Weight: 103.7 kg (228 lb 11.2 oz)   Height: 1.6 m (5' 3\")     Body mass index is 40.51 kg/m .  Consuelo Pineda CMA    "

## 2019-05-16 ENCOUNTER — HEALTH MAINTENANCE LETTER (OUTPATIENT)
Age: 44
End: 2019-05-16

## 2019-06-03 ENCOUNTER — TELEPHONE (OUTPATIENT)
Dept: ENDOCRINOLOGY | Facility: CLINIC | Age: 44
End: 2019-06-03

## 2019-06-03 ENCOUNTER — OFFICE VISIT (OUTPATIENT)
Dept: ENDOCRINOLOGY | Facility: CLINIC | Age: 44
End: 2019-06-03
Payer: COMMERCIAL

## 2019-06-03 VITALS
HEIGHT: 63 IN | HEART RATE: 76 BPM | OXYGEN SATURATION: 99 % | BODY MASS INDEX: 41.34 KG/M2 | DIASTOLIC BLOOD PRESSURE: 91 MMHG | TEMPERATURE: 98.4 F | WEIGHT: 233.3 LBS | SYSTOLIC BLOOD PRESSURE: 149 MMHG | RESPIRATION RATE: 18 BRPM

## 2019-06-03 DIAGNOSIS — E66.01 MORBID OBESITY DUE TO EXCESS CALORIES (H): ICD-10-CM

## 2019-06-03 ASSESSMENT — MIFFLIN-ST. JEOR: SCORE: 1677.37

## 2019-06-03 ASSESSMENT — PAIN SCALES - GENERAL: PAINLEVEL: NO PAIN (0)

## 2019-06-03 NOTE — PROGRESS NOTES
"    Return Medical Weight Management Note     Stephanie Mccarthy  MRN:  [id number]  :  1975  YAHAIRA:  2018    Dear [facility],    I had the pleasure of seeing your patient Stephanie Mccarthy.  She is a 42 year old female who I am continuing to see for treatment of obesity.    CURRENT WEIGHT:   233 lbs 4.8 oz    Wt Readings from Last 4 Encounters:   19 105.8 kg (233 lb 4.8 oz)   18 103.7 kg (228 lb 11.2 oz)   18 102.2 kg (225 lb 4.8 oz)   05/15/17 106.6 kg (235 lb 1.6 oz)       Height:  5' 3\"  Body Mass Index:  Body mass index is 41.33 kg/m .  Vitals:  B/P: 170/109, P: 90    Initial consult weight was 264 lbs on .  Weight change since last visit 18 is up 5 pounds.   Total loss is 31 pounds.    INTERVAL HISTORY:  Busy with work and personal life.   Doesn't do calorie count.  Dinner usually meat and noodles.  No exercise.  Sedentary at work.   Tolerating meds.   Has tried weight-watchers and other calorie counting diets in the past but had struggled with 1200 kcal limits but she was not on any appetite suppressant.  Struggling with snacks and high calorie food after cruise.     Diet and Activity Changes Since Last Visit Reviewed With Patient 6/3/2019   I have made the following changes to my diet since my last visit: I eat one large meal and small snacks   With regards to my diet, I am still struggling with: fresh vegetables gives me hiccups or burps   For breakfast, I typically eat: -   For lunch, I typically eat: -   For supper, I typically eat: -   For snack(s), I typically eat: -   I have made the following changes to my activity/exercise since my last visit: I try to log 46416 steps daily   With regards to my activity/exercise, I am still struggling with: work requires me to sit       MEDICATIONS:   Current Outpatient Medications   Medication     albuterol (PROAIR HFA, PROVENTIL HFA, VENTOLIN HFA) 108 (90 BASE) MCG/ACT inhaler     Phentermine-Topiramate 15-92 MG CP24 " "    No current facility-administered medications for this visit.        Weight Loss Medication History Reviewed With Patient 6/3/2019   Which weight loss medications are you currently taking on a regular basis?  Qysmia (phentermine/topiramate)   Are you having any side effects from the weight loss medication that we have prescribed you? Yes   If you are having side effects please describe: staying asleep - i wake up a lot     BP (!) 149/91 (BP Location: Left arm, Patient Position: Sitting, Cuff Size: Adult Large)   Pulse 76   Temp 98.4  F (36.9  C) (Oral)   Resp 18   Ht 1.6 m (5' 3\")   Wt 105.8 kg (233 lb 4.8 oz)   SpO2 99%   BMI 41.33 kg/m      ASSESSMENT:   Re-focus on food plan and continue phentermine/topiramate.     FOLLOW-UP:    3 months    Rober Nam MD   "

## 2019-06-03 NOTE — NURSING NOTE
"Chief Complaint   Patient presents with     Weight Problem     Pt here for weight management follow up       Vitals:    06/03/19 1038   BP: (!) 149/91   BP Location: Left arm   Patient Position: Sitting   Cuff Size: Adult Large   Pulse: 76   Resp: 18   Temp: 98.4  F (36.9  C)   TempSrc: Oral   SpO2: 99%   Weight: 105.8 kg (233 lb 4.8 oz)   Height: 1.6 m (5' 3\")       Body mass index is 41.33 kg/m .      MAHIN GabrielT                      "

## 2019-06-03 NOTE — LETTER
"6/3/2019       RE: Stephanie Mccarthy  [address] [facility] [address]     Dear Colleague,    Thank you for referring your patient, Stephanie Mccarthy, to the [facility] MEDICAL WEIGHT MANAGEMENT at [facility]. Please see a copy of my visit note below.    Return Medical Weight Management Note     Stephanie Mccarthy  MRN:  [id number]  :  1975  YAHAIRA:  2018    Dear [facility],    I had the pleasure of seeing your patient Stephanie Mccarthy.  She is a 42 year old female who I am continuing to see for treatment of obesity.    CURRENT WEIGHT:   233 lbs 4.8 oz    Wt Readings from Last 4 Encounters:   19 105.8 kg (233 lb 4.8 oz)   18 103.7 kg (228 lb 11.2 oz)   18 102.2 kg (225 lb 4.8 oz)   05/15/17 106.6 kg (235 lb 1.6 oz)       Height:  5' 3\"  Body Mass Index:  Body mass index is 41.33 kg/m .  Vitals:  B/P: 170/109, P: 90    Initial consult weight was 264 lbs on .  Weight change since last visit 18 is up 5 pounds.   Total loss is 31 pounds.    INTERVAL HISTORY:  Busy with work and personal life.   Doesn't do calorie count.  Dinner usually meat and noodles.  No exercise.  Sedentary at work.   Tolerating meds.   Has tried weight-watchers and other calorie counting diets in the past but had struggled with 1200 kcal limits but she was not on any appetite suppressant.  Struggling with snacks and high calorie food after cruise.     Diet and Activity Changes Since Last Visit Reviewed With Patient 6/3/2019   I have made the following changes to my diet since my last visit: I eat one large meal and small snacks   With regards to my diet, I am still struggling with: fresh vegetables gives me hiccups or burps   For breakfast, I typically eat: -   For lunch, I typically eat: -   For supper, I typically eat: -   For snack(s), I typically eat: -   I have made the following changes to my activity/exercise since my last visit: I try to log " "95096 steps daily   With regards to my activity/exercise, I am still struggling with: work requires me to sit       MEDICATIONS:   Current Outpatient Medications   Medication     albuterol (PROAIR HFA, PROVENTIL HFA, VENTOLIN HFA) 108 (90 BASE) MCG/ACT inhaler     Phentermine-Topiramate 15-92 MG CP24     No current facility-administered medications for this visit.        Weight Loss Medication History Reviewed With Patient 6/3/2019   Which weight loss medications are you currently taking on a regular basis?  Qysmia (phentermine/topiramate)   Are you having any side effects from the weight loss medication that we have prescribed you? Yes   If you are having side effects please describe: staying asleep - i wake up a lot     BP (!) 149/91 (BP Location: Left arm, Patient Position: Sitting, Cuff Size: Adult Large)   Pulse 76   Temp 98.4  F (36.9  C) (Oral)   Resp 18   Ht 1.6 m (5' 3\")   Wt 105.8 kg (233 lb 4.8 oz)   SpO2 99%   BMI 41.33 kg/m       ASSESSMENT:   Re-focus on food plan and continue phentermine/topiramate.     FOLLOW-UP:    3 months    Rober Nam MD   "

## 2019-09-29 ENCOUNTER — HEALTH MAINTENANCE LETTER (OUTPATIENT)
Age: 44
End: 2019-09-29

## 2019-12-09 DIAGNOSIS — E66.01 MORBID OBESITY DUE TO EXCESS CALORIES (H): ICD-10-CM

## 2019-12-09 NOTE — TELEPHONE ENCOUNTER
QSYMIA 15-92 MG CP24      Last Written Prescription Date:  6-3-19  Last Fill Quantity: 30,   # refills: 5  Last Office Visit : 6-3-19  Future Office visit:  12-16-19    Routing refill request to provider for review/approval because:  Controlled med  FYI: Failed protocol- overdue labs   ALT,AST CBC, PLT

## 2019-12-16 ENCOUNTER — OFFICE VISIT (OUTPATIENT)
Dept: ENDOCRINOLOGY | Facility: CLINIC | Age: 44
End: 2019-12-16
Payer: COMMERCIAL

## 2019-12-16 VITALS
TEMPERATURE: 98.2 F | DIASTOLIC BLOOD PRESSURE: 83 MMHG | OXYGEN SATURATION: 100 % | SYSTOLIC BLOOD PRESSURE: 142 MMHG | BODY MASS INDEX: 41.6 KG/M2 | HEART RATE: 80 BPM | WEIGHT: 234.8 LBS | HEIGHT: 63 IN

## 2019-12-16 DIAGNOSIS — Z53.9 ERRONEOUS ENCOUNTER--DISREGARD: Primary | ICD-10-CM

## 2019-12-16 ASSESSMENT — PATIENT HEALTH QUESTIONNAIRE - PHQ9
SUM OF ALL RESPONSES TO PHQ QUESTIONS 1-9: 6
10. IF YOU CHECKED OFF ANY PROBLEMS, HOW DIFFICULT HAVE THESE PROBLEMS MADE IT FOR YOU TO DO YOUR WORK, TAKE CARE OF THINGS AT HOME, OR GET ALONG WITH OTHER PEOPLE: NOT DIFFICULT AT ALL
SUM OF ALL RESPONSES TO PHQ QUESTIONS 1-9: 6

## 2019-12-16 ASSESSMENT — PAIN SCALES - GENERAL: PAINLEVEL: NO PAIN (0)

## 2019-12-16 ASSESSMENT — MIFFLIN-ST. JEOR: SCORE: 1684.18

## 2019-12-16 NOTE — LETTER
Date:December 26, 2019      Patient was self referred, no letter generated. Do not send.        [facility] Information

## 2019-12-16 NOTE — LETTER
12/16/2019       RE: Stephanie Mccarthy  [address] [facility] [address]     Dear Colleague,    Thank you for referring your patient, Stephanie Mccarthy, to the [facility] MEDICAL WEIGHT MANAGEMENT at [facility]. Please see a copy of my visit note below.      This encounter was opened in error. Please disregard.    Again, thank you for allowing me to participate in the care of your patient.      Sincerely,    Rober Nam MD

## 2019-12-16 NOTE — NURSING NOTE
"(   Chief Complaint   Patient presents with     RECHECK     Weight management follow up.    )    ( Weight: 106.5 kg (234 lb 12.8 oz) )  ( Height: 160 cm (5' 3\") )  ( BMI (Calculated): 41.59 )  ( Initial Weight: 120.1 kg (264 lb 12.8 oz) )  ( Cumulative weight loss (lbs): 30 )  ( Last Visits Weight: 105.8 kg (233 lb 4.8 oz) )  ( Wt change since last visit (lbs): 1.5 )  ( Waist Circumference (cm): 116 cm )  (   )    ( BP: (!) 142/83 )  (   )  ( Temp: 98.2  F (36.8  C) )  ( Temp src: Oral )  ( Pulse: 80 )  (   )  ( SpO2: 100 % )    (   Patient Active Problem List   Diagnosis     Mild major depression (H)     Anxiety     Acne     CARDIOVASCULAR SCREENING; LDL GOAL LESS THAN 160     S/P LASIK SURGERY 2006     IUD (intrauterine device) in place, paragard 11/2010     Pseudotumor cerebri - resolved     Obesity     Knee pain     Hx gestational diabetes     Chondromalacia of patella     Obesity, morbid, BMI 40.0-49.9 (H)    )  (   Current Outpatient Medications   Medication Sig Dispense Refill     albuterol (PROAIR HFA, PROVENTIL HFA, VENTOLIN HFA) 108 (90 BASE) MCG/ACT inhaler Inhale 2 puffs into the lungs every 6 hours as needed for shortness of breath / dyspnea or wheezing 1 Inhaler 0     Phentermine-Topiramate (QSYMIA) 15-92 MG CP24 Take 1 tablet by mouth daily Please keep 12-16-19 clinic appointment for further refills 30 capsule 0    )  ( Diabetes Eval:    )    ( Pain Eval:  No Pain (0) )    ( Wound Eval:       )    (   History   Smoking Status     Current Some Day Smoker     Packs/day: 0.10     Types: Cigarettes     Last attempt to quit: 10/1/2009   Smokeless Tobacco     Never Used     Comment: Smoking household    )    ( Signed By:  Kim Sow CMA; December 16, 2019; 9:39 AM )    "

## 2020-03-09 DIAGNOSIS — E66.01 MORBID OBESITY DUE TO EXCESS CALORIES (H): ICD-10-CM

## 2020-03-09 NOTE — TELEPHONE ENCOUNTER
QSYMIA 15-92MG CAPSULES     Last Written Prescription Date:  12/9/2019  Last Fill Quantity: 30,   # refills: 0  Last Office Visit : 6/3/2019  Future Office visit:  None    Routing refill request to provider for review/approval because:  6 months office visit and Labs Over Due:  CBC, ALT, AST, PLT  Follow up appointment??   Continue med??   Change med??  Refer to clinic for review and refills per Providers orders.    Estelita Welch RN  Central Triage Red Flags/Med Refills

## 2020-03-10 DIAGNOSIS — E66.01 MORBID OBESITY DUE TO EXCESS CALORIES (H): ICD-10-CM

## 2020-03-10 RX ORDER — PHENTERMINE AND TOPIRAMATE 15; 92 MG/1; MG/1
1 CAPSULE, EXTENDED RELEASE ORAL DAILY
Qty: 30 CAPSULE | Refills: 0 | Status: CANCELLED | OUTPATIENT
Start: 2020-03-10

## 2020-03-10 RX ORDER — PHENTERMINE AND TOPIRAMATE 15; 92 MG/1; MG/1
1 CAPSULE, EXTENDED RELEASE ORAL DAILY
Qty: 30 CAPSULE | OUTPATIENT
Start: 2020-03-10

## 2020-03-10 NOTE — TELEPHONE ENCOUNTER
Gap in refill? Message out to patient. Appointment in less than 1 week with Dr. Nam. Patient no showed last appointment. Will wait to refill until patient responds back or is seen in clinic.

## 2020-03-10 NOTE — TELEPHONE ENCOUNTER
Phentermine-Topiramate (QSYMIA) 15-92 MG CP24  Last Written Prescription Date:  12/9/19  Last Fill Quantity: 30,   # refills: 0  Last Office Visit :12/16/19  Future Office visit: 3/16/20      Routing refill request to provider for review/approval because: controlled

## 2020-03-10 NOTE — TELEPHONE ENCOUNTER
Dwight Capone, please refuse refill and close encounter. Thank you!       Refused order per office request.     Pt needs office visit.    Estelita Welch RN  Central Triage Red Flags/Med Refills

## 2020-03-10 NOTE — TELEPHONE ENCOUNTER
Pt looking for refill on QSYMIA as now has a [facility] appt next Monday, 3/16.  Pharm of record is correct.    [phone]  **OK to leave detailed VM**

## 2020-03-15 ENCOUNTER — HEALTH MAINTENANCE LETTER (OUTPATIENT)
Age: 45
End: 2020-03-15

## 2020-03-16 ENCOUNTER — VIRTUAL VISIT (OUTPATIENT)
Dept: ENDOCRINOLOGY | Facility: CLINIC | Age: 45
End: 2020-03-16

## 2020-03-16 DIAGNOSIS — E66.01 MORBID OBESITY (H): Primary | ICD-10-CM

## 2020-03-16 NOTE — PROGRESS NOTES
Return Medical Weight Management Note     Stephanie Mccarthy  MRN:  [id number]  :  1975  YAHAIRA:  2018    Dear [facility],    I had the pleasure of seeing your patient Stephanie Mccarthy.  She is a 42 year old female who I am continuing to see for treatment of obesity.    CURRENT WEIGHT:   0 lbs 0 oz    Wt Readings from Last 4 Encounters:   19 106.5 kg (234 lb 12.8 oz)   19 105.8 kg (233 lb 4.8 oz)   18 103.7 kg (228 lb 11.2 oz)   18 102.2 kg (225 lb 4.8 oz)     Height:  Data Unavailable  Body Mass Index:  There is no height or weight on file to calculate BMI.  Vitals:  B/P: 170/109, P: 90    Initial consult weight was 264 lbs on .  Weight change since last visit 6/3/19 is up ? pounds.   Total loss is 31 pounds.    INTERVAL HISTORY:  Says she has gained a lot of weight - self weight 256. Busy with work and personal life. Remodeling house so no kitchen and eating lots of fast food and snacks.  Struggling with snacks at night.     No flowsheet data found.    MEDICATIONS:   Current Outpatient Medications   Medication     albuterol (PROAIR HFA, PROVENTIL HFA, VENTOLIN HFA) 108 (90 BASE) MCG/ACT inhaler     Phentermine-Topiramate (QSYMIA) 15-92 MG CP24     No current facility-administered medications for this visit.      Weight Loss Medication History Reviewed With Patient 3/10/2020   Which weight loss medications are you currently taking on a regular basis?  Qysmia (phentermine/topiramate)   If you are not taking a weight loss medication that was prescribed to you, please indicate why: My insurance does not cover the cost   Are you having any side effects from the weight loss medication that we have prescribed you? Yes   If you are having side effects please describe: I am gaining weight     There were no vitals taken for this visit.    ASSESSMENT:   Re-focus on food plan and restart phentermine/topiramate.     FOLLOW-UP:    3 months  The patient is being evaluated via  "a billable telephone visit and has been notified of following:     \"This telephone visit will be conducted via a call between you and your physician/provider. We have found that certain health care needs can be provided without the need for a physical exam.  This service lets us provide the care you need with a short phone conversation.  If a prescription is necessary we can send it directly to your pharmacy.  If lab work is needed we can place an order for that and you can then stop by our lab to have the test done at a later time.    Telephone visits are billed at different rates depending on your insurance coverage. During this emergency period, for some insurers they may be billed the same as an in-person visit.  Please reach out to your insurance provider with any questions.    If during the course of the call the physician/provider feels a telephone visit is not appropriate, you will not be charged for this service.\"    Patient has given verbal consent for Telephone visit?  Yes    How would you like to obtain your AVS? MyChart    Phone call duration: 15 minutes.    Rober Nam MD   "

## 2020-04-14 ENCOUNTER — TELEPHONE (OUTPATIENT)
Dept: ENDOCRINOLOGY | Facility: CLINIC | Age: 45
End: 2020-04-14

## 2020-04-14 NOTE — TELEPHONE ENCOUNTER
Prior Authorization Retail Medication Request    Medication/Dose: Qsymia  ICD code (if different than what is on RX):  Morbid obesity (H) [E66.01]  - Primary   Previously Tried and Failed:  History of diet and exercise  Rationale:  She is a 45 year old female who I am continuing to see for treatment of obesity related to:Hyperlipidemia, Sleep Apnea (has CPAP and does not use), Knee Pain, GERD and Depression.  Has tried weight-watchers, Atkins, Slim Fast  and other calorie counting diets in the past but had struggled with 1200 kcal limits but she was not on any appetite suppressant. She has used Qsymia since 2014.     Insurance Name:    Insurance ID:        Pharmacy Information (if different than what is on RX)  Name:  [id number] [address] AT [facility] OF [address]   Phone:  [phone]

## 2020-04-14 NOTE — TELEPHONE ENCOUNTER
Central Prior Authorization Team   Phone: [phone]      PA Initiation    Medication: Qsymia-PA initiated  Insurance Company: [employer] - Phone [phone] Fax [phone]  Pharmacy Filling the Rx:  #[id number] - [place], [place] - [address] AT [facility] [address] [facility]  [address]  Filling Pharmacy Phone: [phone]  Filling Pharmacy Fax:    Start Date: 4/14/2020

## 2020-04-15 NOTE — TELEPHONE ENCOUNTER
PRIOR AUTHORIZATION DENIED    Medication: Qsymia-PA DENIED    Denial Date: 4/14/2020    Denial Rational:         Appeal Information:

## 2020-05-13 DIAGNOSIS — E66.01 MORBID OBESITY DUE TO EXCESS CALORIES (H): Primary | ICD-10-CM

## 2020-05-13 NOTE — TELEPHONE ENCOUNTER
Patient has been taking Qsymia 7.5-46mg caps for several months. She had been paying out of pocket but is requesting the medication be split into generics for cheaper cost. Routing to provider for sign off.

## 2020-05-14 ENCOUNTER — TELEPHONE (OUTPATIENT)
Dept: ENDOCRINOLOGY | Facility: CLINIC | Age: 45
End: 2020-05-14

## 2020-05-14 RX ORDER — TOPIRAMATE 50 MG/1
50 TABLET, FILM COATED ORAL AT BEDTIME
Qty: 30 TABLET | Refills: 3 | Status: SHIPPED | OUTPATIENT
Start: 2020-05-14 | End: 2020-09-14

## 2020-05-14 RX ORDER — PHENTERMINE HYDROCHLORIDE 15 MG/1
15 CAPSULE ORAL EVERY MORNING
Qty: 30 CAPSULE | Refills: 3 | Status: SHIPPED | OUTPATIENT
Start: 2020-05-14 | End: 2020-09-14

## 2020-05-14 NOTE — TELEPHONE ENCOUNTER
Prior Authorization Retail Medication Request    Medication/Dose: Phentermine  ICD code (if different than what is on RX):  Morbid obesity due to excess calories (H) [E66.01]  - Primary     Previously Tried and Failed:  History of diet and exercise, Qsymia  Rationale:  Previously on Qsymia-not covered by insurance, too expensive. Has tried weight-watchers and other calorie counting diets in the past but had struggled with 1200 kcal limits but she was not on any appetite suppressant.    Insurance Name:    Insurance ID:        Pharmacy Information (if different than what is on RX)  Name:  [facility] #[id number] - [place], [place] - [address] AT [facility] OF [address]  Phone: [phone]

## 2020-05-14 NOTE — TELEPHONE ENCOUNTER
Prior Authorization Approval    Authorization Effective Date: 4/14/2020  Authorization Expiration Date: 5/14/2021  Medication: phentermine (ADIPEX-P) 15 MG capsule - APPROVED  Approved Dose/Quantity:   Reference #:     Insurance Company: [employer] - Phone [phone] Fax [phone]  Expected CoPay:       CoPay Card Available:      Foundation Assistance Needed:    Which Pharmacy is filling the prescription (Not needed for infusion/clinic administered): [id number] - [place], [place] - [address] AT [facility] [address] [facility]  Pharmacy Notified: Yes-Pharmacy will notify patient when ready.  Patient Notified: No

## 2020-09-14 ENCOUNTER — VIRTUAL VISIT (OUTPATIENT)
Dept: ENDOCRINOLOGY | Facility: CLINIC | Age: 45
End: 2020-09-14

## 2020-09-14 VITALS — BODY MASS INDEX: 43.59 KG/M2 | HEIGHT: 63 IN | WEIGHT: 246 LBS

## 2020-09-14 DIAGNOSIS — E66.01 MORBID OBESITY DUE TO EXCESS CALORIES (H): ICD-10-CM

## 2020-09-14 RX ORDER — PHENTERMINE HYDROCHLORIDE 15 MG/1
15 CAPSULE ORAL EVERY MORNING
Qty: 30 CAPSULE | Refills: 5 | Status: SHIPPED | OUTPATIENT
Start: 2020-09-14 | End: 2021-03-01

## 2020-09-14 RX ORDER — TOPIRAMATE 50 MG/1
50 TABLET, FILM COATED ORAL 2 TIMES DAILY
Qty: 60 TABLET | Refills: 11 | Status: SHIPPED | OUTPATIENT
Start: 2020-09-14 | End: 2021-03-01

## 2020-09-14 ASSESSMENT — PAIN SCALES - GENERAL: PAINLEVEL: NO PAIN (0)

## 2020-09-14 ASSESSMENT — MIFFLIN-ST. JEOR: SCORE: 1729.98

## 2020-09-14 NOTE — LETTER
"9/14/2020       RE: Stephanie Mccarthy  [address] [facility]  [place] [address]     Dear Colleague,    Thank you for referring your patient, Stephanie Mccarthy, to the [facility] MEDICAL WEIGHT MANAGEMENT at [facility]. Please see a copy of my visit note below.    Stephanie Mccarthy is a 45 year old female who is being evaluated via a billable video visit.      The patient has been notified of following:     \"This video visit will be conducted via a call between you and your physician/provider. We have found that certain health care needs can be provided without the need for an in-person physical exam.  This service lets us provide the care you need with a video conversation.  If a prescription is necessary we can send it directly to your pharmacy.  If lab work is needed we can place an order for that and you can then stop by our lab to have the test done at a later time.    Video visits are billed at different rates depending on your insurance coverage.  Please reach out to your insurance provider with any questions.    If during the course of the call the physician/provider feels a video visit is not appropriate, you will not be charged for this service.\"    Patient has given verbal consent for Video visit? Yes  How would you like to obtain your AVS? MyChart  If you are dropped from the video visit, the video invite should be resent to: Text to cell phone: [phone] (D[employer]imity(  Will anyone else be joining your video visit? No    Video-Visit Details    Type of service:  Video Visit    Video call duration: 15 minutes.  I explained the conditions and plans (more than 50% of time was counseling/coordination of weight management).    Originating Location (pt. Location): Home    Distant Location (provider location):  [facility] MEDICAL WEIGHT MANAGEMENT     Platform used for Video Visit: ChatterPlug    Rober Nam MD              [facility] Medical Weight Management Note   " "  Stephanie Mccarthy  MRN:  [id number]  :  1975  YAHAIRA:  2018    Dear [facility],    I had the pleasure of seeing your patient Stephanie Mccarthy.  She is a 42 year old female who I am continuing to see for treatment of obesity.    CURRENT WEIGHT:   246 lbs 0 oz    Wt Readings from Last 4 Encounters:   20 111.6 kg (246 lb)   19 106.5 kg (234 lb 12.8 oz)   19 105.8 kg (233 lb 4.8 oz)   18 103.7 kg (228 lb 11.2 oz)     Height:  5' 3\"[per pt[  Body Mass Index:  Body mass index is 43.58 kg/m .  Vitals:  B/P: 170/109, P: 90    Initial consult weight was 264 lbs on .  Weight change since last visit 3/16/20 is down 10 pounds.   Total loss is 18 pounds.    INTERVAL HISTORY:  Busy with work and personal life.  Struggling with snacks at night.     No flowsheet data found.    MEDICATIONS:   Current Outpatient Medications   Medication     phentermine (ADIPEX-P) 15 MG capsule     topiramate (TOPAMAX) 50 MG tablet     No current facility-administered medications for this visit.      Weight Loss Medication History Reviewed With Patient 3/10/2020   Which weight loss medications are you currently taking on a regular basis?  Qysmia (phentermine/topiramate)   If you are not taking a weight loss medication that was prescribed to you, please indicate why: My insurance does not cover the cost   Are you having any side effects from the weight loss medication that we have prescribed you? Yes   If you are having side effects please describe: I am gaining weight     Ht 1.6 m (5' 3\")   Wt 111.6 kg (246 lb)   BMI 43.58 kg/m      ASSESSMENT:   Re-focus on food plan with focus on no between meal snacking, aggressive lowering of starches and cheese and maintain phentermine 15 AM and and increase topiramate to 50 AM and suppertime.     FOLLOW-UP:    3 months  Video call duration: 15 minutes.  I explained the conditions and plans (more than 50% of time was counseling/coordination of weight " "management).    Rober Nam MD     The patient was evaluated via a billable video visit and notified \"This visit will be via video call between you and your physician. If lab work is needed we can place an order and you can later stop by the lab. Video visits are billed at different rates depending on your insurance coverage.  Please reach out to your insurance provider with any questions. If the physician feels a video visit is not appropriate, you will not be charged for this service.\" Patient gave verbal consent for Video visit and would you like to obtain AVS by [employer].    "

## 2020-09-14 NOTE — PROGRESS NOTES
"    Return Medical Weight Management Note     Stephanie Mccarthy  MRN:  [id number]  :  1975  YAHAIRA:  2018    Dear [facility],    I had the pleasure of seeing your patient Stephanie Mccarthy.  She is a 42 year old female who I am continuing to see for treatment of obesity.    CURRENT WEIGHT:   246 lbs 0 oz    Wt Readings from Last 4 Encounters:   20 111.6 kg (246 lb)   19 106.5 kg (234 lb 12.8 oz)   19 105.8 kg (233 lb 4.8 oz)   18 103.7 kg (228 lb 11.2 oz)     Height:  5' 3\"[per pt[  Body Mass Index:  Body mass index is 43.58 kg/m .  Vitals:  B/P: 170/109, P: 90    Initial consult weight was 264 lbs on .  Weight change since last visit 3/16/20 is down 10 pounds.   Total loss is 18 pounds.    INTERVAL HISTORY:  Busy with work and personal life.  Struggling with snacks at night.     No flowsheet data found.    MEDICATIONS:   Current Outpatient Medications   Medication     phentermine (ADIPEX-P) 15 MG capsule     topiramate (TOPAMAX) 50 MG tablet     No current facility-administered medications for this visit.      Weight Loss Medication History Reviewed With Patient 3/10/2020   Which weight loss medications are you currently taking on a regular basis?  Qysmia (phentermine/topiramate)   If you are not taking a weight loss medication that was prescribed to you, please indicate why: My insurance does not cover the cost   Are you having any side effects from the weight loss medication that we have prescribed you? Yes   If you are having side effects please describe: I am gaining weight     Ht 1.6 m (5' 3\")   Wt 111.6 kg (246 lb)   BMI 43.58 kg/m      ASSESSMENT:   Re-focus on food plan with focus on no between meal snacking, aggressive lowering of starches and cheese and maintain phentermine 15 AM and and increase topiramate to 50 AM and suppertime.     FOLLOW-UP:    3 months  Video call duration: 15 minutes.  I explained the conditions and plans (more than 50% of time " "was counseling/coordination of weight management).    Rober Nam MD     The patient was evaluated via a billable video visit and notified \"This visit will be via video call between you and your physician. If lab work is needed we can place an order and you can later stop by the lab. Video visits are billed at different rates depending on your insurance coverage.  Please reach out to your insurance provider with any questions. If the physician feels a video visit is not appropriate, you will not be charged for this service.\" Patient gave verbal consent for Video visit and would you like to obtain AVS by [employer].  "

## 2020-09-14 NOTE — PROGRESS NOTES
"Stephanie Mccarthy is a 45 year old female who is being evaluated via a billable video visit.      The patient has been notified of following:     \"This video visit will be conducted via a call between you and your physician/provider. We have found that certain health care needs can be provided without the need for an in-person physical exam.  This service lets us provide the care you need with a video conversation.  If a prescription is necessary we can send it directly to your pharmacy.  If lab work is needed we can place an order for that and you can then stop by our lab to have the test done at a later time.    Video visits are billed at different rates depending on your insurance coverage.  Please reach out to your insurance provider with any questions.    If during the course of the call the physician/provider feels a video visit is not appropriate, you will not be charged for this service.\"    Patient has given verbal consent for Video visit? Yes  How would you like to obtain your AVS? MyChart  If you are dropped from the video visit, the video invite should be resent to: Text to cell phone: [phone] (Doximity(  Will anyone else be joining your video visit? No    Video-Visit Details    Type of service:  Video Visit    Video call duration: 15 minutes.  I explained the conditions and plans (more than 50% of time was counseling/coordination of weight management).    Originating Location (pt. Location): Home    Distant Location (provider location):   [facility] WEIGHT MANAGEMENT     Platform used for Video Visit: Jamelimtoro Nam MD        "

## 2020-09-14 NOTE — NURSING NOTE
"(   Chief Complaint   Patient presents with     Weight Problem    )    ( Weight: 111.6 kg (246 lb)(per pt) )  ( Height: 160 cm (5' 3\")(per pt) )  ( BMI (Calculated): 43.58 )  (   )  ( Cumulative weight loss (lbs): 18 )  ( Last Visits Weight: 106.5 kg (234 lb 12.8 oz) )  ( Wt change since last visit (lbs): 11.2 )  (   )  (   )    (   )  (   )  (   )  (   )  (   )  (   )  (   )    (   Patient Active Problem List   Diagnosis     Mild major depression (H)     Anxiety     Acne     CARDIOVASCULAR SCREENING; LDL GOAL LESS THAN 160     S/P LASIK SURGERY 2006     IUD (intrauterine device) in place, paragard 11/2010     Pseudotumor cerebri - resolved     Obesity     Knee pain     Hx gestational diabetes     Chondromalacia of patella     Obesity (BMI 30-39.9)     Mild intermittent asthma    )  (   Current Outpatient Medications   Medication Sig Dispense Refill     phentermine (ADIPEX-P) 15 MG capsule Take 1 capsule (15 mg) by mouth every morning 30 capsule 3     topiramate (TOPAMAX) 50 MG tablet Take 1 tablet (50 mg) by mouth At Bedtime 30 tablet 3    )  ( Diabetes Eval:    )    ( Pain Eval:  No Pain (0) )    ( Wound Eval:       )    (   History   Smoking Status     Current Some Day Smoker     Packs/day: 0.10     Types: Cigarettes     Last attempt to quit: 10/1/2009   Smokeless Tobacco     Never Used     Comment: Smoking household    )    ( Signed By:  Hoang Strauss, EMT; September 14, 2020; 8:34 AM )   "

## 2020-11-24 ENCOUNTER — VIRTUAL VISIT (OUTPATIENT)
Dept: FAMILY MEDICINE | Facility: OTHER | Age: 45
End: 2020-11-24
Payer: COMMERCIAL

## 2020-11-24 PROCEDURE — 99421 OL DIG E/M SVC 5-10 MIN: CPT | Performed by: PHYSICIAN ASSISTANT

## 2020-11-24 NOTE — PROGRESS NOTES
"Date: 2020 10:26:01  Clinician: Mendel Bolden  Clinician NPI: [id number]  Patient: Stephanie Mccarthy  Patient : 1975  Patient Address: [address] [place] [address] [place] [address]  Patient Phone: [phone]  Visit Protocol: URI  Patient Summary:  Stephanie is a 45 year old ( : 1975 ) female who initiated a [facility] Visit for cold, sinus infection, or influenza. When asked the question \"Please sign me up to receive news, health information and promotions from [facility].\", Stephanie responded \"No\".    Stephanie states her symptoms started 1-2 days ago.   Her symptoms consist of malaise, a sore throat, ear pain, and a cough.   Symptom details     Cough: Stephanie coughs a few times an hour and her cough is more bothersome at night. Phlegm comes into her throat when she coughs. She does not believe her cough is caused by post-nasal drip. The color of the phlegm is yellow.     Sore throat: Stephanie reports having mild throat pain (1-3 on a 10 point pain scale), has exudate on her tonsils, and can swallow liquids. She is not sure if the lymph nodes in her neck are enlarged. A rash has not appeared on the skin since the sore throat started.      Stephanie denies having vomiting, rhinitis, facial pain or pressure, myalgias, chills, teeth pain, ageusia, diarrhea, headache, wheezing, fever, nasal congestion, nausea, and anosmia. She also denies taking antibiotic medication in the past month and having recent facial or sinus surgery in the past 60 days. She is not experiencing dyspnea.   Precipitating events  Within the past week, Stephanie has not been exposed to someone with strep throat. She has not recently been exposed to someone with influenza. Stephanie has been in close contact with the following high risk individuals: adults 65 or older, children under the age of 5, and people with asthma, heart disease or diabetes.   Pertinent COVID-19 (Coronavirus) information  Stephanie does not work or volunteer as " healthcare worker or a . In the past 14 days, Stephanie has not worked or volunteered at a healthcare facility or group living setting.   In the past 14 days, she also has not lived in a congregate living setting.   Stephanie has not had a close contact with a laboratory-confirmed COVID-19 patient within 14 days of symptom onset.    Since December 2019, Stephanie has not been tested for COVID-19 and has had upper respiratory infection (URI) or influenza-like illness.      Date(s) of previous URI or influenza-like illness (free-text): February 2020     Symptoms Stephanie experienced during previous URI or influenza-like illness as reported by the patient (free-text): Cough, Achiness, Fever, Chills, Sore Throat - Lasted 10 days.  Lesser symptoms lingered for a long time.        Pertinent medical history  Stephanie does not get yeast infections when she takes antibiotics.   Stephanie does not need a return to work/school note.   Weight: 250 lbs   Stephanie smokes or uses smokeless tobacco.   She denies pregnancy and denies breastfeeding. She is currently menstruating.   Additional information as reported by the patient (free text): I am prone to Bronchitis.  I feel similar to when I have a bout of Bronchitis.   Weight: 250 lbs    MEDICATIONS: phentermine oral, ALLERGIES: doxycycline  Clinician Response:  Dear Stephanie,   Your symptoms show that you may have coronavirus (COVID-19). This illness can cause fever, cough and trouble breathing. Many people get a mild case and get better on their own. Some people can get very sick.  What should I do?  We would like to test you for this virus.   1. Please call [phone] to schedule your visit. Explain that you were referred by [facility] to have a COVID-19 test. Be ready to share your [facility] visit ID number.  * If you need to schedule in [facility] please call [phone] or for [facility] employees please call [phone].  * If you need to schedule in the [facility]  "area please call [phone]. [employer] employees call [phone].  The following will serve as your written order for this COVID Test, ordered by me, for the indication of suspected COVID [Z20.828]: The test will be ordered in [employer], our electronic health record, after you are scheduled. It will show as ordered and authorized by Alexander Dominguez MD.  Order: COVID-19 (Coronavirus) PCR for SYMPTOMATIC testing from [facility].   2. When it's time for your COVID test:  Stay at least 6 feet away from others. (If someone will drive you to your test, stay in the backseat, as far away from the  as you can.)   Cover your mouth and nose with a mask, tissue or washcloth.  Go straight to the testing site. Don't make any stops on the way there or back.      3.Starting now: Stay home and away from others (self-isolate) until:   You've had no fever---and no medicine that reduces fever---for one full day (24 hours). And...   Your other symptoms have gotten better. For example, your cough or breathing has improved. And...   At least 10 days have passed since your symptoms started.       During this time, don't leave the house except for testing or medical care.   Stay in your own room, even for meals. Use your own bathroom if you can.   Stay away from others in your home. No hugging, kissing or shaking hands. No visitors.  Don't go to work, school or anywhere else.    Clean \"high touch\" surfaces often (doorknobs, counters, handles, etc.). Use a household cleaning spray or wipes. You'll find a full list of  on the EPA website: www.epa.gov/pesticide-registration/list-n-disinfectants-use-against-sars-cov-2.   Cover your mouth and nose with a mask, tissue or washcloth to avoid spreading germs.  Wash your hands and face often. Use soap and water.  Caregivers in these groups are at risk for severe illness due to COVID-19:  o People 65 years and older  o People who live in a nursing home or long-term care facility  o People with " chronic disease (lung, heart, cancer, diabetes, kidney, liver, immunologic)  o People who have a weakened immune system, including those who:   Are in cancer treatment  Take medicine that weakens the immune system, such as corticosteroids  Had a bone marrow or organ transplant  Have an immune deficiency  Have poorly controlled HIV or AIDS  Are obese (body mass index of 40 or higher)  Smoke regularly   o Caregivers should wear gloves while washing dishes, handling laundry and cleaning bedrooms and bathrooms.  o Use caution when washing and drying laundry: Don't shake dirty laundry, and use the warmest water setting that you can.  o For more tips, go to www.cdc.gov/coronavirus/2019-ncov/downloads/10Things.pdf.    4.Sign up for [employer]. We know it's scary to hear that you might have COVID-19. We want to track your symptoms to make sure you're okay over the next 2 weeks. Please look for an email from [employer]---this is a free, online program that we'll use to keep in touch. To sign up, follow the link in the email. Learn more at [email or web address]  How can I take care of myself?   Get lots of rest. Drink extra fluids (unless a doctor has told you not to).   Take Tylenol (acetaminophen) for fever or pain. If you have liver or kidney problems, ask your family doctor if it's okay to take Tylenol.   Adults can take either:    650 mg (two 325 mg pills) every 4 to 6 hours, or...   1,000 mg (two 500 mg pills) every 8 hours as needed.    Note: Don't take more than 3,000 mg in one day. Acetaminophen is found in many medicines (both prescribed and over-the-counter medicines). Read all labels to be sure you don't take too much.   For children, check the Tylenol bottle for the right dose. The dose is based on the child's age or weight.    If you have other health problems (like cancer, heart failure, an organ transplant or severe kidney disease): Call your specialty clinic if you don't feel better in the  next 2 days.       Know when to call 911. Emergency warning signs include:    Trouble breathing or shortness of breath Pain or pressure in the chest that doesn't go away Feeling confused like you haven't felt before, or not being able to wake up Bluish-colored lips or face.  Where can I get more information?   [facility] -- About COVID-19: [email or web address]   CDC -- What to Do If You're Sick: www.cdc.gov/coronavirus/2019-ncov/about/steps-when-sick.html   CDC -- Ending Home Isolation: www.cdc.gov/coronavirus/2019-ncov/hcp/disposition-in-home-patients.html   CDC -- Caring for Someone: www.cdc.gov/coronavirus/2019-ncov/if-you-are-sick/care-for-someone.html   [facility] -- Interim Guidance for Hospital Discharge to Home: www.[facility]/diseases/coronavirus/hcp/hospdischarge.pdf   [facility] clinical trials (COVID-19 research studies): clinicalaffairs.[facility]-clinical-trials    Below are the COVID-19 hotlines at the [place] [facility] ([facility]). Interpreters are available.    For health questions: Call 449-138-7044 or 1-243.633.8572 (7 a.m. to 7 p.m.) For questions about schools and childcare: Call 886-765-9106 or 1-633.213.9172 (7 a.m. to 7 p.m.)    Diagnosis: Contact with and (suspected) exposure to other viral communicable diseases  Diagnosis ICD: Z20.828  Additional Clinician Notes:   If your symptoms are not improving or worsen, please go to one of our urgent care locations for evaluation and possible lab work.

## 2020-11-25 DIAGNOSIS — Z20.822 SUSPECTED COVID-19 VIRUS INFECTION: Primary | ICD-10-CM

## 2020-11-25 PROCEDURE — U0003 INFECTIOUS AGENT DETECTION BY NUCLEIC ACID (DNA OR RNA); SEVERE ACUTE RESPIRATORY SYNDROME CORONAVIRUS 2 (SARS-COV-2) (CORONAVIRUS DISEASE [COVID-19]), AMPLIFIED PROBE TECHNIQUE, MAKING USE OF HIGH THROUGHPUT TECHNOLOGIES AS DESCRIBED BY CMS-2020-01-R: HCPCS | Performed by: FAMILY MEDICINE

## 2020-11-26 LAB
SARS-COV-2 RNA SPEC QL NAA+PROBE: NOT DETECTED
SPECIMEN SOURCE: NORMAL

## 2020-12-08 DIAGNOSIS — Z12.31 ENCOUNTER FOR SCREENING MAMMOGRAM FOR BREAST CANCER: ICD-10-CM

## 2021-01-14 ENCOUNTER — HEALTH MAINTENANCE LETTER (OUTPATIENT)
Age: 46
End: 2021-01-14

## 2021-03-01 ENCOUNTER — VIRTUAL VISIT (OUTPATIENT)
Dept: ENDOCRINOLOGY | Facility: CLINIC | Age: 46
End: 2021-03-01
Payer: COMMERCIAL

## 2021-03-01 VITALS — HEIGHT: 63 IN | BODY MASS INDEX: 46.95 KG/M2 | WEIGHT: 265 LBS

## 2021-03-01 DIAGNOSIS — E66.01 MORBID OBESITY DUE TO EXCESS CALORIES (H): ICD-10-CM

## 2021-03-01 PROCEDURE — 99213 OFFICE O/P EST LOW 20 MIN: CPT | Mod: 95 | Performed by: INTERNAL MEDICINE

## 2021-03-01 RX ORDER — TOPIRAMATE 25 MG/1
75 TABLET, FILM COATED ORAL 2 TIMES DAILY
Qty: 180 TABLET | Refills: 11 | Status: SHIPPED | OUTPATIENT
Start: 2021-03-01 | End: 2022-02-07 | Stop reason: DRUGHIGH

## 2021-03-01 RX ORDER — PHENTERMINE HYDROCHLORIDE 30 MG/1
30 CAPSULE ORAL EVERY MORNING
Qty: 30 CAPSULE | Refills: 5 | Status: SHIPPED | OUTPATIENT
Start: 2021-03-01 | End: 2021-03-19

## 2021-03-01 RX ORDER — PHENTERMINE HYDROCHLORIDE 15 MG/1
15 CAPSULE ORAL EVERY MORNING
Qty: 30 CAPSULE | Refills: 5 | Status: CANCELLED | OUTPATIENT
Start: 2021-03-01

## 2021-03-01 ASSESSMENT — PAIN SCALES - GENERAL: PAINLEVEL: NO PAIN (0)

## 2021-03-01 ASSESSMENT — MIFFLIN-ST. JEOR: SCORE: 1811.16

## 2021-03-01 NOTE — PROGRESS NOTES
"    Return Medical Weight Management Note     Stephanie Mccarthy  MRN:  [id number]  :  1975  YAHAIRA:  2018    Dear [facility],    I had the pleasure of seeing your patient Stephanie Mccarthy.  She is a 42 year old female who I am continuing to see for treatment of obesity.    CURRENT WEIGHT:   265 lbs 0 oz    Wt Readings from Last 4 Encounters:   21 120.2 kg (265 lb)   20 111.6 kg (246 lb)   19 106.5 kg (234 lb 12.8 oz)   19 105.8 kg (233 lb 4.8 oz)     Height:  5' 3\"[per pt[  Body Mass Index:  Body mass index is 46.94 kg/m .  Vitals:  B/P: 170/109, P: 90    Initial consult weight was 264 lbs on .  Weight change since last visit 20 is up 19 pounds.   Total gain is 1 pounds.    INTERVAL HISTORY:  Busy with work and personal life.  Struggling with snacks at night.     No flowsheet data found.    MEDICATIONS:   Current Outpatient Medications   Medication     phentermine (ADIPEX-P) 15 MG capsule     topiramate (TOPAMAX) 50 MG tablet     No current facility-administered medications for this visit.      Weight Loss Medication History Reviewed With Patient 3/10/2020   Which weight loss medications are you currently taking on a regular basis?  Qysmia (phentermine/topiramate)   If you are not taking a weight loss medication that was prescribed to you, please indicate why: My insurance does not cover the cost   Are you having any side effects from the weight loss medication that we have prescribed you? Yes   If you are having side effects please describe: I am gaining weight     Ht 1.6 m (5' 3\")   Wt 120.2 kg (265 lb)   BMI 46.94 kg/m      ASSESSMENT:   Re-focus on food plan with focus on no between meal snacking, aggressive lowering of starches and cheese and increase phentermine to 30 AM and and increase topiramate to 75 AM and suppertime.     FOLLOW-UP:    3 months  Video call duration: 15 minutes.  I explained the conditions and plans (more than 50% of time was " "counseling/coordination of weight management).    Rober Nam MD     The patient was evaluated via a billable video visit and notified \"This visit will be via video call between you and your physician. If lab work is needed we can place an order and you can later stop by the lab. Video visits are billed at different rates depending on your insurance coverage.  Please reach out to your insurance provider with any questions. If the physician feels a video visit is not appropriate, you will not be charged for this service.\" Patient gave verbal consent for Video visit and would you like to obtain AVS by [employer].  "

## 2021-03-01 NOTE — NURSING NOTE
"(   Chief Complaint   Patient presents with     Weight Problem     MWM return    )    ( Weight: 120.2 kg (265 lb)(per pt) )  ( Height: 160 cm (5' 3\")(per pt) )  ( BMI (Calculated): 46.94 )  (   )  ( Cumulative weight loss (lbs): -1 )  ( Last Visits Weight: 111.6 kg (246 lb) )  ( Wt change since last visit (lbs): 19 )  (   )  (   )    (   )  (   )  (   )  (   )  (   )  (   )  (   )    (   Patient Active Problem List   Diagnosis     Mild major depression (H)     Anxiety     Acne     CARDIOVASCULAR SCREENING; LDL GOAL LESS THAN 160     S/P LASIK SURGERY 2006     IUD (intrauterine device) in place, paragard 11/2010     Pseudotumor cerebri - resolved     Obesity     Knee pain     Hx gestational diabetes     Chondromalacia of patella     Obesity (BMI 30-39.9)     Mild intermittent asthma    )  (   Current Outpatient Medications   Medication Sig Dispense Refill     phentermine (ADIPEX-P) 15 MG capsule Take 1 capsule (15 mg) by mouth every morning 30 capsule 5     topiramate (TOPAMAX) 50 MG tablet Take 1 tablet (50 mg) by mouth 2 times daily 60 tablet 11    )  ( Diabetes Eval:    )    ( Pain Eval:  No Pain (0) )    ( Wound Eval:       )    (   History   Smoking Status     Current Some Day Smoker     Packs/day: 0.10     Types: Cigarettes     Last attempt to quit: 10/1/2009   Smokeless Tobacco     Never Used     Comment: Smoking household    )    ( Signed By:  Hoang Strauss, EMT; March 1, 2021; 10:14 AM )   "

## 2021-03-01 NOTE — LETTER
"3/1/2021       RE: Stephanie Mccarthy  [address] [address] [facility] [address]     Dear Colleague,    Thank you for referring your patient, Stephanie Mccarthy, to the [facility] [place] [facility]. Please see a copy of my visit note below.    Stephanie Mccarthy is a 46 year old female who is being evaluated via a billable video visit.      The patient has been notified of following:     \"This video visit will be conducted via a call between you and your physician/provider. We have found that certain health care needs can be provided without the need for an in-person physical exam.  This service lets us provide the care you need with a video conversation.  If a prescription is necessary we can send it directly to your pharmacy.  If lab work is needed we can place an order for that and you can then stop by our lab to have the test done at a later time.    If during the course of the call the physician/provider feels a video visit is not appropriate, you will not be charged for this service.\"     Patient confirmed that they are in [place] for today's visit     If the video visit is dropped, please send link to:   Text to cell phone: [phone]    Video-Visit Details  Type of service:  Video Visit    Video call duration: 15 minutes.  I explained the conditions and plans (more than 50% of time was counseling/coordination of weight management).    Originating Location (pt. Location): Home    Distant Location (provider location):  [facility] WEIGHT MANAGEMENT [facility]     Platform used: [facility] Medical Weight Management Note     Stephanie Mccarthy  MRN:  [id number]  :  1975  YAHAIRA:  2018    Dear [facility],    I had the pleasure of seeing your patient Stephanie Mccarthy.  She is a 42 year old female who I am continuing to see for treatment of obesity.    CURRENT WEIGHT:   265 " "lbs 0 oz    Wt Readings from Last 4 Encounters:   03/01/21 120.2 kg (265 lb)   09/14/20 111.6 kg (246 lb)   12/16/19 106.5 kg (234 lb 12.8 oz)   06/03/19 105.8 kg (233 lb 4.8 oz)     Height:  5' 3\"[per pt[  Body Mass Index:  Body mass index is 46.94 kg/m .  Vitals:  B/P: 170/109, P: 90    Initial consult weight was 264 lbs on 2014.  Weight change since last visit 09/14/20 is up 19 pounds.   Total gain is 1 pounds.    INTERVAL HISTORY:  Busy with work and personal life.  Struggling with snacks at night.     No flowsheet data found.    MEDICATIONS:   Current Outpatient Medications   Medication     phentermine (ADIPEX-P) 15 MG capsule     topiramate (TOPAMAX) 50 MG tablet     No current facility-administered medications for this visit.      Weight Loss Medication History Reviewed With Patient 3/10/2020   Which weight loss medications are you currently taking on a regular basis?  Qysmia (phentermine/topiramate)   If you are not taking a weight loss medication that was prescribed to you, please indicate why: My insurance does not cover the cost   Are you having any side effects from the weight loss medication that we have prescribed you? Yes   If you are having side effects please describe: I am gaining weight     Ht 1.6 m (5' 3\")   Wt 120.2 kg (265 lb)   BMI 46.94 kg/m      ASSESSMENT:   Re-focus on food plan with focus on no between meal snacking, aggressive lowering of starches and cheese and increase phentermine to 30 AM and and increase topiramate to 75 AM and suppertime.     FOLLOW-UP:    3 months  Video call duration: 15 minutes.  I explained the conditions and plans (more than 50% of time was counseling/coordination of weight management).    Rober Nam MD     The patient was evaluated via a billable video visit and notified \"This visit will be via video call between you and your physician. If lab work is needed we can place an order and you can later stop by the lab. Video visits are billed at " "different rates depending on your insurance coverage.  Please reach out to your insurance provider with any questions. If the physician feels a video visit is not appropriate, you will not be charged for this service.\" Patient gave verbal consent for Video visit and would you like to obtain AVS by [employer].      "

## 2021-03-01 NOTE — PROGRESS NOTES
"Stephanie Mccarthy is a 46 year old female who is being evaluated via a billable video visit.      The patient has been notified of following:     \"This video visit will be conducted via a call between you and your physician/provider. We have found that certain health care needs can be provided without the need for an in-person physical exam.  This service lets us provide the care you need with a video conversation.  If a prescription is necessary we can send it directly to your pharmacy.  If lab work is needed we can place an order for that and you can then stop by our lab to have the test done at a later time.    If during the course of the call the physician/provider feels a video visit is not appropriate, you will not be charged for this service.\"     Patient confirmed that they are in [place] for today's visit     If the video visit is dropped, please send link to:   Text to cell phone: [phone]    Video-Visit Details  Type of service:  Video Visit    Video call duration: 15 minutes.  I explained the conditions and plans (more than 50% of time was counseling/coordination of weight management).    Originating Location (pt. Location): Home    Distant Location (provider location):  [facility] WEIGHT MANAGEMENT CLINIC [place]     Platform used: [employer]            "

## 2021-03-03 ENCOUNTER — OFFICE VISIT (OUTPATIENT)
Dept: FAMILY MEDICINE | Facility: CLINIC | Age: 46
End: 2021-03-03
Payer: COMMERCIAL

## 2021-03-03 VITALS
SYSTOLIC BLOOD PRESSURE: 150 MMHG | OXYGEN SATURATION: 98 % | HEART RATE: 80 BPM | BODY MASS INDEX: 45.25 KG/M2 | DIASTOLIC BLOOD PRESSURE: 98 MMHG | WEIGHT: 255.4 LBS | RESPIRATION RATE: 22 BRPM | HEIGHT: 63 IN | TEMPERATURE: 97.6 F

## 2021-03-03 DIAGNOSIS — Z23 NEED FOR PROPHYLACTIC VACCINATION AND INOCULATION AGAINST INFLUENZA: ICD-10-CM

## 2021-03-03 DIAGNOSIS — F32.0 MILD MAJOR DEPRESSION (H): ICD-10-CM

## 2021-03-03 DIAGNOSIS — G47.19 EXCESSIVE DAYTIME SLEEPINESS: ICD-10-CM

## 2021-03-03 DIAGNOSIS — Z11.59 NEED FOR HEPATITIS C SCREENING TEST: ICD-10-CM

## 2021-03-03 DIAGNOSIS — Z30.432 ENCOUNTER FOR IUD REMOVAL: ICD-10-CM

## 2021-03-03 DIAGNOSIS — Z13.220 LIPID SCREENING: ICD-10-CM

## 2021-03-03 DIAGNOSIS — E66.01 MORBID OBESITY (H): ICD-10-CM

## 2021-03-03 DIAGNOSIS — Z13.1 SCREENING FOR DIABETES MELLITUS: ICD-10-CM

## 2021-03-03 DIAGNOSIS — R06.83 SNORING: ICD-10-CM

## 2021-03-03 DIAGNOSIS — I10 HYPERTENSION GOAL BP (BLOOD PRESSURE) < 140/90: ICD-10-CM

## 2021-03-03 DIAGNOSIS — Z12.4 SCREENING FOR MALIGNANT NEOPLASM OF CERVIX: ICD-10-CM

## 2021-03-03 DIAGNOSIS — Z00.00 ROUTINE GENERAL MEDICAL EXAMINATION AT A HEALTH CARE FACILITY: Primary | ICD-10-CM

## 2021-03-03 DIAGNOSIS — Z11.4 SCREENING FOR HIV (HUMAN IMMUNODEFICIENCY VIRUS): ICD-10-CM

## 2021-03-03 LAB
ANION GAP SERPL CALCULATED.3IONS-SCNC: 4 MMOL/L (ref 3–14)
BUN SERPL-MCNC: 9 MG/DL (ref 7–30)
CALCIUM SERPL-MCNC: 8.6 MG/DL (ref 8.5–10.1)
CHLORIDE SERPL-SCNC: 107 MMOL/L (ref 94–109)
CO2 SERPL-SCNC: 25 MMOL/L (ref 20–32)
CREAT SERPL-MCNC: 0.61 MG/DL (ref 0.52–1.04)
CREAT UR-MCNC: 98 MG/DL
GFR SERPL CREATININE-BSD FRML MDRD: >90 ML/MIN/{1.73_M2}
GLUCOSE SERPL-MCNC: 98 MG/DL (ref 70–99)
HBA1C MFR BLD: 5.6 % (ref 0–5.6)
MICROALBUMIN UR-MCNC: 44 MG/L
MICROALBUMIN/CREAT UR: 45.35 MG/G CR (ref 0–25)
POTASSIUM SERPL-SCNC: 3.6 MMOL/L (ref 3.4–5.3)
SODIUM SERPL-SCNC: 136 MMOL/L (ref 133–144)

## 2021-03-03 PROCEDURE — 83036 HEMOGLOBIN GLYCOSYLATED A1C: CPT | Performed by: FAMILY MEDICINE

## 2021-03-03 PROCEDURE — 87389 HIV-1 AG W/HIV-1&-2 AB AG IA: CPT | Performed by: FAMILY MEDICINE

## 2021-03-03 PROCEDURE — 90686 IIV4 VACC NO PRSV 0.5 ML IM: CPT | Performed by: FAMILY MEDICINE

## 2021-03-03 PROCEDURE — 99214 OFFICE O/P EST MOD 30 MIN: CPT | Mod: 25 | Performed by: FAMILY MEDICINE

## 2021-03-03 PROCEDURE — 90471 IMMUNIZATION ADMIN: CPT | Performed by: FAMILY MEDICINE

## 2021-03-03 PROCEDURE — 82043 UR ALBUMIN QUANTITATIVE: CPT | Performed by: FAMILY MEDICINE

## 2021-03-03 PROCEDURE — 80048 BASIC METABOLIC PNL TOTAL CA: CPT | Performed by: FAMILY MEDICINE

## 2021-03-03 PROCEDURE — 99396 PREV VISIT EST AGE 40-64: CPT | Mod: 25 | Performed by: FAMILY MEDICINE

## 2021-03-03 PROCEDURE — 86803 HEPATITIS C AB TEST: CPT | Performed by: FAMILY MEDICINE

## 2021-03-03 PROCEDURE — 58301 REMOVE INTRAUTERINE DEVICE: CPT | Performed by: FAMILY MEDICINE

## 2021-03-03 PROCEDURE — 36415 COLL VENOUS BLD VENIPUNCTURE: CPT | Performed by: FAMILY MEDICINE

## 2021-03-03 PROCEDURE — 87624 HPV HI-RISK TYP POOLED RSLT: CPT | Performed by: FAMILY MEDICINE

## 2021-03-03 PROCEDURE — G0145 SCR C/V CYTO,THINLAYER,RESCR: HCPCS | Performed by: FAMILY MEDICINE

## 2021-03-03 PROCEDURE — 93000 ELECTROCARDIOGRAM COMPLETE: CPT | Mod: 59 | Performed by: FAMILY MEDICINE

## 2021-03-03 RX ORDER — HYDROCHLOROTHIAZIDE 25 MG/1
25 TABLET ORAL DAILY
Qty: 30 TABLET | Refills: 1 | Status: SHIPPED | OUTPATIENT
Start: 2021-03-03 | End: 2021-04-30

## 2021-03-03 ASSESSMENT — ANXIETY QUESTIONNAIRES
7. FEELING AFRAID AS IF SOMETHING AWFUL MIGHT HAPPEN: NOT AT ALL
6. BECOMING EASILY ANNOYED OR IRRITABLE: SEVERAL DAYS
5. BEING SO RESTLESS THAT IT IS HARD TO SIT STILL: NOT AT ALL
GAD7 TOTAL SCORE: 2
2. NOT BEING ABLE TO STOP OR CONTROL WORRYING: NOT AT ALL
IF YOU CHECKED OFF ANY PROBLEMS ON THIS QUESTIONNAIRE, HOW DIFFICULT HAVE THESE PROBLEMS MADE IT FOR YOU TO DO YOUR WORK, TAKE CARE OF THINGS AT HOME, OR GET ALONG WITH OTHER PEOPLE: NOT DIFFICULT AT ALL
1. FEELING NERVOUS, ANXIOUS, OR ON EDGE: NOT AT ALL
3. WORRYING TOO MUCH ABOUT DIFFERENT THINGS: NOT AT ALL

## 2021-03-03 ASSESSMENT — PATIENT HEALTH QUESTIONNAIRE - PHQ9
SUM OF ALL RESPONSES TO PHQ QUESTIONS 1-9: 5
5. POOR APPETITE OR OVEREATING: SEVERAL DAYS

## 2021-03-03 ASSESSMENT — MIFFLIN-ST. JEOR: SCORE: 1767.62

## 2021-03-03 NOTE — PATIENT INSTRUCTIONS
Preventive Health Recommendations  Female Ages 40 to 49    Yearly exam:     See your health care provider every year in order to  1. Review health changes.   2. Discuss preventive care.    3. Review your medicines if your doctor prescribed any.      Get a Pap test every three years (unless you have an abnormal result and your provider advises testing more often).      If you get Pap tests with HPV test, you only need to test every 5 years, unless you have an abnormal result. You do not need a Pap test if your uterus was removed (hysterectomy) and you have not had cancer.      You should be tested each year for STDs (sexually transmitted diseases), if you're at risk.     Ask your doctor if you should have a mammogram.      Have a colonoscopy (test for colon cancer) if someone in your family has had colon cancer or polyps before age 50.       Have a cholesterol test every 5 years.       Have a diabetes test (fasting glucose) after age 45. If you are at risk for diabetes, you should have this test every 3 years.    Shots: Get a flu shot each year. Get a tetanus shot every 10 years.     Nutrition:     Eat at least 5 servings of fruits and vegetables each day.    Eat whole-grain bread, whole-wheat pasta and brown rice instead of white grains and rice.    Get adequate Calcium and Vitamin D.      Lifestyle    Exercise at least 150 minutes a week (an average of 30 minutes a day, 5 days a week). This will help you control your weight and prevent disease.    Limit alcohol to one drink per day.    No smoking.     Wear sunscreen to prevent skin cancer.    See your dentist every six months for an exam and cleaning.  Patient Education     High Blood Pressure, New, Begin Treatment    Your blood pressure was high enough today to start treatment with medicines. Often healthcare providers don t know what causes high blood pressure (hypertension). But it can be controlled with lifestyle changes and medicines. High blood pressure  usually has no symptoms. But it can sometimes cause headache, dizziness, blurred vision, a rushing sound in your ears, chest pain, or shortness of breath. But even without symptoms, high blood pressure that s not treated raises your risk for heart attack, heart failure, kidney disease, vascular disease, and stroke. High blood pressure is a serious health risk and shouldn t be ignored.    Blood pressure measurements are given as 2 numbers.  Systolic blood pressure is the upper number. This is the pressure when the heart contracts.  Diastolic blood pressure is the lower number. This is the pressure when the heart relaxes between beats.  You will see your blood pressure readings written together. For example, a person with a systolic pressure of 118 and a diastolic pressure of 78 will have 118/78 written in the medical record.   Blood pressure is categorized as normal, elevated, or stage 1 or stage 2 high blood pressure:  Normal blood pressure is systolic of less than 120 and diastolic of less than 80 (120/80)  Elevated blood pressure is systolic of 120 to 129 and diastolic less than 80  Stage 1 high blood pressure is systolic is 130 to 139 or diastolic between 80 to 89  Stage 2 high blood pressure is when systolic is 140 or higher or the diastolic is 90 or higher  Home care  If you have high blood pressure, do what's listed below to lower your blood pressure. If you are taking medicines for high blood pressure, these methods may reduce or end your need for medicines in the future.  Begin a weight-loss program if you are overweight.  Cut back on how much salt you get in your diet. Here s how to do this:  Don t eat foods that have a lot of salt. These include olives, pickles, smoked meats, and salted potato chips.  Don t add salt to your food at the table.  Use only small amounts of salt when cooking.  Review food labels to track how much salt is in prepared foods.  When eating out, ask that no additional salt be added  to your food order.  Ask your provider about the DASH diet or the DASH (dietary approaches to stop hypertension) eating plan.  Start an exercise program. Talk with your healthcare provider about the type of exercise program that would be best for you. It doesn't have to be hard. Even brisk walking for 20 minutes 3 times a week is a good form of exercise.  Don t take medicines that have heart stimulants. This includes many over-the-counter cold and sinus decongestant pills and sprays, as well as diet pills. Check the warnings about high blood pressure on the label. Before purchasing any over-the-counter medicines or supplements, always ask the pharmacist about the product's potential interaction with your high blood pressure and your high blood pressure medicines.  Stimulants such as amphetamine or cocaine could be lethal for someone with high blood pressure. Never take these.  Limit how much caffeine you get in your diet. Switch to caffeine-free products.  Stop smoking. If you are a long-time smoker, this can be hard. Enroll in a stop-smoking program to make it more likely that you will quit for good. Or, talk with your healthcare provider about nicotine replacements or medicines that can help.  Learn how to handle stress. This is an important part of any program to lower blood pressure. Learn about relaxation methods like meditation, yoga, or biofeedback.  If your provider prescribed medicines, take them exactly as directed. Missing doses may cause your blood pressure to get out of control.  If you miss a dose or doses, check with your healthcare provider or pharmacist about what to do.  Limit alcohol. Drinking too much alcohol can raise blood pressure. Men should have no more than 2 drinks a day. Women should have no more than 1. A drink is equal to 1 beer, or a small glass of wine, or a shot of liquor..  Consider buying an automatic blood pressure machine so you can check your blood pressure regularly at home.  Your provider can make a recommendation. You can get one of these at most pharmacies.  The American Heart Association recommends the following guidelines for home blood pressure monitoring:  Don't smoke or drink coffee or other caffeinated drinks or exercise for 30 minutes before taking your blood pressure.  Go to the bathroom before the test.  Relax for 5 minutes before taking the measurement.  Sit with your back supported (don't sit on a couch or soft chair); keep your feet on the floor uncrossed. Place your arm on a solid flat surface (like a table) with the upper part of the arm at heart level. Place the middle of the cuff directly above the bend of the elbow. Check the monitor's instruction manual for an illustration.  Take multiple readings. When you measure, take 2 to 3 readings one minute apart and record all of the results.  Take your blood pressure at the same time every day, or as your healthcare provider recommends.  Record the date, time, and blood pressure reading.  Take the record with you to your next medical appointment. If your blood pressure monitor has a built-in memory, simply take the monitor with you to your next appointment.  Call your provider if you have several high readings. Don't be frightened by a single high blood pressure reading, but if you get several high readings, check in with your healthcare provider.  Note: If blood pressure reaches a systolic (top number) of 180 or higher OR diastolic (bottom number) of 120 or higher, seek emergency medical treatment.  Follow-up care  Because a new blood pressure medicine was started today, it s important that you have your blood pressure rechecked. This is to make sure that the medicine is working and that you have no serious side effects. Keep all your follow up appointments. Write down medicine and blood pressure questions and bring them to your next appointment. If you have pressing concerns about your new medicine or your blood pressure,  call your provider. Unless told otherwise, follow up with your healthcare provider within the next 3 days.  When to seek medical care  Call your healthcare provider right away if any of these occur:  Blood pressure reaches a systolic (top number) of 180 or higher, OR diastolic (bottom number) of 120 or higher, seek emergency medical treatment.  Chest pain or shortness of breath  Severe headache  Throbbing or rushing sound in the ears  Nosebleed  Sudden severe pain in your belly (abdomen)  Extreme drowsiness, confusion, or fainting  Dizziness or dizziness with a spinning sensation (vertigo)  Weakness of an arm or leg or one side of the face  You have problems speaking or seeing   ShareThis last reviewed this educational content on 12/1/2019 2000-2020 [employer]. All rights reserved. This information is not intended as a substitute for professional medical care. Always follow your healthcare professional's instructions.

## 2021-03-03 NOTE — PROGRESS NOTES
SUBJECTIVE:   CC: Stephanie Mccarthy is an 46 year old woman who presents for preventive health visit.       Patient has been advised of split billing requirements and indicates understanding: Yes    Healthy Habits:    Do you get at least three servings of calcium containing foods daily (dairy, green leafy vegetables, etc.)? no    Amount of exercise or daily activities, outside of work: none    Problems taking medications regularly No    Medication side effects: No    Have you had an eye exam in the past two years? yes    Do you see a dentist twice per year? yes    Do you have sleep apnea, excessive snoring or daytime drowsiness?yes, would like to do sleep study.  Excessive snoring, worsening       MULTIPLE CONCERNS    IUD removal  Placement 11/3/10  Is not looking to begin another means on contraception at this time. Reports that she is not sexually active with spouse at this time.     Snoring  Concerned about possible STELLA.  States that  has been complaining that she snores. Also aware that she wakes up multiple times at night and has excessive daytime fatigue.   No prior sleep study done.    Blood pressure is elevated in the clinic today. Denies having any symptoms. No chest pain or shortness of breath.   Admits that it has been elevated at other office visits as well.   BP Readings from Last 3 Encounters:   03/03/21 (!) 150/98   12/16/19 (!) 142/83   06/03/19 (!) 149/91     Morbid obesity  Currently seeing an Endocrinologist at [facility] for medication management for weight loss.  Recently started on Topamax and Phentermine on 3/1/2021. Tolerating well so far.      History of mild depression. Currently off meds. States that she is coping well without medications at this time.   Last PHQ-9 3/3/2021   1.  Little interest or pleasure in doing things 1   2.  Feeling down, depressed, or hopeless 0   3.  Trouble falling or staying asleep, or sleeping too much 1   4.  Feeling tired or having little  energy 1   5.  Poor appetite or overeating 2   6.  Feeling bad about yourself 0   7.  Trouble concentrating 0   8.  Moving slowly or restless 0   Q9: Thoughts of better off dead/self-harm past 2 weeks 0   PHQ-9 Total Score 5   Difficulty at work, home, or with people -     ESEQUIEL-7  3/3/2021   1. Feeling nervous, anxious, or on edge 0   2. Not being able to stop or control worrying 0   3. Worrying too much about different things 0   4. Trouble relaxing 1   5. Being so restless that it is hard to sit still 0   6. Becoming easily annoyed or irritable 1   7. Feeling afraid, as if something awful might happen 0   ESEQUIEL-7 Total Score 2   If you checked any problems, how difficult have they made it for you to do your work, take care of things at home, or get along with other people? Not difficult at all     In the past two weeks have you had thoughts of suicide or self-harm?  No.    Do you have concerns about your personal safety or the safety of others?   No    HEALTH CARE MAINTENANCE: Due for Flu shot, pap smear and annual labs     Patient informed that anything we discuss that is not related to preventative medicine, may be billed for; patient verbalizes understanding.      Today's PHQ-2 Score:   PHQ-2 ( 1999 [employer]) 3/3/2021 12/24/2018   Q1: Little interest or pleasure in doing things 0 0   Q2: Feeling down, depressed or hopeless 0 0   PHQ-2 Score 0 0   Q1: Little interest or pleasure in doing things - -   Q2: Feeling down, depressed or hopeless - -   PHQ-2 Score - -       Abuse: Current or Past(Physical, Sexual or Emotional)- No  Do you feel safe in your environment? Yes    Have you ever done Advance Care Planning? (For example, a Health Directive, POLST, or a discussion with a medical provider or your loved ones about your wishes): No, advance care planning information given to patient to review.  Patient plans to discuss their wishes with loved ones or provider.      Social History     Tobacco Use     Smoking status:  Current Some Day Smoker     Packs/day: 0.10     Types: Cigarettes     Last attempt to quit: 10/1/2009     Years since quittin.4     Smokeless tobacco: Never Used     Tobacco comment: Smoking household   Substance Use Topics     Alcohol use: Yes     Comment: Occasional     If you drink alcohol do you typically have >3 drinks per day or >7 drinks per week? No                     Reviewed orders with patient.  Reviewed health maintenance and updated orders accordingly - Yes  Lab work is in process  Labs reviewed in EPIC  BP Readings from Last 3 Encounters:   21 (!) 150/98   19 (!) 142/83   19 (!) 149/91    Wt Readings from Last 3 Encounters:   21 115.8 kg (255 lb 6.4 oz)   21 120.2 kg (265 lb)   20 111.6 kg (246 lb)                  Patient Active Problem List   Diagnosis     Mild major depression (H)     Anxiety     Acne     CARDIOVASCULAR SCREENING; LDL GOAL LESS THAN 160     S/P LASIK SURGERY 2006     IUD (intrauterine device) in place, paragard 2010     Pseudotumor cerebri - resolved     Obesity     Knee pain     Hx gestational diabetes     Chondromalacia of patella     Obesity (BMI 30-39.9)     Mild intermittent asthma     Morbid obesity (H)     Past Surgical History:   Procedure Laterality Date     HC TOOTH EXTRACTION W/FORCEP       LASIK  2006       Social History     Tobacco Use     Smoking status: Current Some Day Smoker     Packs/day: 0.10     Types: Cigarettes     Last attempt to quit: 10/1/2009     Years since quittin.4     Smokeless tobacco: Never Used     Tobacco comment: Smoking household   Substance Use Topics     Alcohol use: Yes     Comment: Occasional     Family History   Problem Relation Age of Onset     Cancer Maternal Grandmother         lung     Heart Disease Maternal Grandmother      Diabetes Maternal Grandmother      Breast Cancer Maternal Grandfather         mets     Cancer Paternal Grandmother      Obesity Mother      Hypertension Mother       Coronary Artery Disease Father         nonfatal MI @ 52     Alcohol/Drug Brother      Cerebrovascular Disease Brother      Alcohol/Drug Brother      Thyroid Disease No family hx of      Glaucoma No family hx of      Macular Degeneration No family hx of          Current Outpatient Medications   Medication Sig Dispense Refill     hydrochlorothiazide (HYDRODIURIL) 25 MG tablet Take 1 tablet (25 mg) by mouth daily 30 tablet 1     topiramate (TOPAMAX) 25 MG tablet Take 3 tablets (75 mg) by mouth 2 times daily 180 tablet 11     phentermine (ADIPEX-P) 30 MG capsule Take 1 capsule (30 mg) by mouth every morning 30 capsule 5     Allergies   Allergen Reactions     Doxycycline      Pseudotumor cerebri     Tetracycline Other (See Comments)     Pseudotumor cerebri.       Perfume Difficulty breathing           Pertinent mammograms are reviewed under the imaging tab.  History of abnormal Pap smear: NO - age 30-65 PAP every 5 years with negative HPV co-testing recommended  PAP / HPV 2016   PAP NIL NIL     Reviewed and updated as needed this visit by clinical staff  Tobacco  Allergies  Meds   Med Hx  Surg Hx  Fam Hx          Reviewed and updated as needed this visit by Provider  Tobacco     Med Hx  Surg Hx  Fam Hx           Past Medical History:   Diagnosis Date     Acne     ff Dermatologist     Depression with anxiety     off antidepressants     IUD (intrauterine device) in place     sharan IUD in      Morbid obesity (H)     on weight loss meds. ff Endo q 3 months     Obesity     ff Dermatologist     Reactive airway disease     on Albuterol as needed     Tobacco use     1 P/week      Past Surgical History:   Procedure Laterality Date     HC TOOTH EXTRACTION W/FORCEP       LASIK       OB History    Para Term  AB Living   2 2 0 0 0 2   SAB TAB Ectopic Multiple Live Births   0 0 0 0 0      # Outcome Date GA Lbr Miky/2nd Weight Sex Delivery Anes PTL Lv   2 Para            1 Para      "           ROS:  CONSTITUTIONAL: NEGATIVE for fever, chills, change in weight  INTEGUMENTARU/SKIN: NEGATIVE for worrisome rashes, moles or lesions  EYES: NEGATIVE for vision changes or irritation  ENT: NEGATIVE for ear, mouth and throat problems  RESP: NEGATIVE for significant cough or SOB  BREAST: NEGATIVE for masses, tenderness or discharge  CV: NEGATIVE for chest pain, palpitations or peripheral edema  GI: NEGATIVE for nausea, abdominal pain, heartburn, or change in bowel habits  : NEGATIVE for unusual urinary or vaginal symptoms. Periods are regular.  MUSCULOSKELETAL: NEGATIVE for significant arthralgias or myalgia  NEURO: NEGATIVE for weakness, dizziness or paresthesias  PSYCHIATRIC: NEGATIVE for changes in mood or affect    OBJECTIVE:   BP (!) 150/98   Pulse 80   Temp 97.6  F (36.4  C) (Tympanic)   Resp 22   Ht 1.6 m (5' 3\")   Wt 115.8 kg (255 lb 6.4 oz)   LMP 02/12/2021   SpO2 98%   Breastfeeding No   BMI 45.24 kg/m    EXAM:  GENERAL: healthy, alert and no distress  EYES: Eyes grossly normal to inspection, PERRL and conjunctivae and sclerae normal  HENT: ear canals and TM's normal, nose and mouth without ulcers or lesions  NECK: no adenopathy, no asymmetry, masses, or scars and thyroid normal to palpation  RESP: lungs clear to auscultation - no rales, rhonchi or wheezes  BREAST: normal without masses, tenderness or nipple discharge and no palpable axillary masses or adenopathy  CV: regular rate and rhythm, normal S1 S2, no S3 or S4, no murmur, click or rub, no peripheral edema and peripheral pulses strong  ABDOMEN: soft, nontender, no hepatosplenomegaly, no masses and bowel sounds normal   (female): normal female external genitalia, normal urethral meatus, vaginal mucosa pink, moist, well rugated, and normal cervix/adnexa/uterus without masses or discharge. IUD strings visualized. Pap smear done.   MS: no gross musculoskeletal defects noted, no edema  SKIN: no suspicious lesions or rashes  NEURO: " Normal strength and tone, mentation intact and speech normal  PSYCH: mentation appears normal, affect normal/bright    Diagnostic Test Results:  Reviewed and discussed with patient prior to discharge.  EKG: appears normal, NSR, normal axis, normal intervals, no acute ST/T changes c/w ischemia, no LVH by voltage criteria, there are no prior tracings available      ASSESSMENT/PLAN:   Stephanie was seen today for physical.    Diagnoses and all orders for this visit:    Routine general medical examination at a health care facility    Screening for malignant neoplasm of cervix  -     Pap imaged thin layer screen with HPV - recommended age 30 - 65 years (select HPV order below)  -     HPV High Risk Types DNA Cervical    Screening for HIV (human immunodeficiency virus)  -     HIV Antigen Antibody Combo    Need for hepatitis C screening test  -     Hepatitis C Screen Reflex to HCV RNA Quant and Genotype    Lipid screening  -     Lipid panel reflex to direct LDL Fasting; Future    Screening for diabetes mellitus  -     Hemoglobin A1c    Mild major depression (H), stable without medications      - PHQ-9/ESEQUIEL 7 completed, see above/Epic for details        - Continue to monitor    Hypertension goal BP (blood pressure) < 140/90, newly diagnosed  -     Baseline EKG 12-lead complete w/read - Clinics  -     Albumin Random Urine Quantitative with Creat Ratio  -     Basic metabolic panel        -     Start: hydrochlorothiazide (HYDRODIURIL) 25 MG tablet; Take 1 tablet (25 mg) by mouth daily    Snoring with Excessive daytime sleepiness  Evaluate for STELLA  -     SLEEP EVALUATION & MANAGEMENT REFERRAL - ADULT -[facility] - [facility]  [phone] (Age 15 and up); Future    Encounter for IUD removal       -     REMOVE INTRAUTERINE DEVICE    Morbid obesity (H)      -    Currently on medication management under the care of Endocrinology      -    On Topamax and Phentermine, started on 3/1/2021    Need for prophylactic  "vaccination and inoculation against influenza  -     INFLUENZA VACCINE IM > 6 MONTHS VALENT IIV4 [43706]  -     ADMIN 1st VACCINE        Patient has been advised of split billing requirements and indicates understanding: Yes  COUNSELING:   Reviewed preventive health counseling, as reflected in patient instructions       Regular exercise       Healthy diet/nutrition    Estimated body mass index is 45.24 kg/m  as calculated from the following:    Height as of this encounter: 1.6 m (5' 3\").    Weight as of this encounter: 115.8 kg (255 lb 6.4 oz).    Weight management plan: Discussed healthy diet and exercise guidelines    She reports that she has been smoking cigarettes. She has been smoking about 0.10 packs per day. She has never used smokeless tobacco.  Tobacco Cessation Action Plan:   Self help information given to patient      Counseling Resources:  ATP IV Guidelines  Pooled Cohorts Equation Calculator  Breast Cancer Risk Calculator  BRCA-Related Cancer Risk Assessment: FHS-7 Tool  FRAX Risk Assessment  ICSI Preventive Guidelines  Dietary Guidelines for Americans, 2010  USDA's MyPlate  ASA Prophylaxis  Lung CA Screening    Follow up in 2 weeks- BP check (Ancillary).   Next Annual Physical due in 3 /2022    Cuca Ho MD  [facility]  "

## 2021-03-04 LAB
HCV AB SERPL QL IA: NONREACTIVE
HIV 1+2 AB+HIV1 P24 AG SERPL QL IA: NONREACTIVE

## 2021-03-04 ASSESSMENT — ANXIETY QUESTIONNAIRES: GAD7 TOTAL SCORE: 2

## 2021-03-04 ASSESSMENT — ASTHMA QUESTIONNAIRES: ACT_TOTALSCORE: 24

## 2021-03-06 LAB
COPATH REPORT: NORMAL
PAP: NORMAL

## 2021-03-08 LAB
FINAL DIAGNOSIS: NORMAL
HPV HR 12 DNA CVX QL NAA+PROBE: NEGATIVE
HPV16 DNA SPEC QL NAA+PROBE: NEGATIVE
HPV18 DNA SPEC QL NAA+PROBE: NEGATIVE
SPECIMEN DESCRIPTION: NORMAL
SPECIMEN SOURCE CVX/VAG CYTO: NORMAL

## 2021-03-17 ENCOUNTER — ALLIED HEALTH/NURSE VISIT (OUTPATIENT)
Dept: NURSING | Facility: CLINIC | Age: 46
End: 2021-03-17
Payer: COMMERCIAL

## 2021-03-17 VITALS — SYSTOLIC BLOOD PRESSURE: 151 MMHG | HEART RATE: 90 BPM | DIASTOLIC BLOOD PRESSURE: 97 MMHG

## 2021-03-17 DIAGNOSIS — Z01.30 BLOOD PRESSURE CHECK: Primary | ICD-10-CM

## 2021-03-17 PROCEDURE — 99207 PR NO CHARGE NURSE ONLY: CPT

## 2021-03-17 NOTE — PROGRESS NOTES
Stephanie Mccarthy is a 46 year old patient who comes in today for a Blood Pressure check.  Initial BP:  BP (!) 151/97 (BP Location: Right arm, Cuff Size: Adult Large)   Pulse 90      Data Unavailable  Disposition: follow-up as previously indicated by provider and results routed to provider

## 2021-03-18 PROBLEM — E66.01 MORBID OBESITY (H): Chronic | Status: ACTIVE | Noted: 2021-03-03

## 2021-03-19 ENCOUNTER — VIRTUAL VISIT (OUTPATIENT)
Dept: SLEEP MEDICINE | Facility: CLINIC | Age: 46
End: 2021-03-19
Attending: FAMILY MEDICINE
Payer: COMMERCIAL

## 2021-03-19 VITALS — WEIGHT: 255 LBS | HEIGHT: 63 IN | BODY MASS INDEX: 45.18 KG/M2

## 2021-03-19 DIAGNOSIS — R53.83 MALAISE AND FATIGUE: ICD-10-CM

## 2021-03-19 DIAGNOSIS — Z72.820 LACK OF ADEQUATE SLEEP: ICD-10-CM

## 2021-03-19 DIAGNOSIS — G47.19 EXCESSIVE DAYTIME SLEEPINESS: ICD-10-CM

## 2021-03-19 DIAGNOSIS — R53.81 MALAISE AND FATIGUE: ICD-10-CM

## 2021-03-19 DIAGNOSIS — E66.813 CLASS 3 SEVERE OBESITY DUE TO EXCESS CALORIES WITH SERIOUS COMORBIDITY AND BODY MASS INDEX (BMI) OF 45.0 TO 49.9 IN ADULT (H): ICD-10-CM

## 2021-03-19 DIAGNOSIS — I10 ESSENTIAL HYPERTENSION: ICD-10-CM

## 2021-03-19 DIAGNOSIS — R06.00 DYSPNEA AND RESPIRATORY ABNORMALITY: Primary | ICD-10-CM

## 2021-03-19 DIAGNOSIS — R06.83 SNORING: ICD-10-CM

## 2021-03-19 DIAGNOSIS — R06.89 DYSPNEA AND RESPIRATORY ABNORMALITY: Primary | ICD-10-CM

## 2021-03-19 DIAGNOSIS — G47.30 SLEEP APNEA, UNSPECIFIED TYPE: ICD-10-CM

## 2021-03-19 DIAGNOSIS — E66.01 CLASS 3 SEVERE OBESITY DUE TO EXCESS CALORIES WITH SERIOUS COMORBIDITY AND BODY MASS INDEX (BMI) OF 45.0 TO 49.9 IN ADULT (H): ICD-10-CM

## 2021-03-19 PROCEDURE — 99203 OFFICE O/P NEW LOW 30 MIN: CPT | Mod: 95 | Performed by: PHYSICIAN ASSISTANT

## 2021-03-19 RX ORDER — ZOLPIDEM TARTRATE 5 MG/1
TABLET ORAL
Qty: 1 TABLET | Refills: 0 | Status: SHIPPED | OUTPATIENT
Start: 2021-03-19 | End: 2021-07-02

## 2021-03-19 ASSESSMENT — MIFFLIN-ST. JEOR: SCORE: 1765.8

## 2021-03-19 NOTE — PATIENT INSTRUCTIONS
Your BMI is Body mass index is 45.17 kg/m .  Weight management is a personal decision.  If you are interested in exploring weight loss strategies, the following discussion covers the approaches that may be successful. Body mass index (BMI) is one way to tell whether you are at a healthy weight, overweight, or obese. It measures your weight in relation to your height.  A BMI of 18.5 to 24.9 is in the healthy range. A person with a BMI of 25 to 29.9 is considered overweight, and someone with a BMI of 30 or greater is considered obese. More than two-thirds of American adults are considered overweight or obese.  Being overweight or obese increases the risk for further weight gain. Excess weight may lead to heart disease and diabetes.  Creating and following plans for healthy eating and physical activity may help you improve your health.  Weight control is part of healthy lifestyle and includes exercise, emotional health, and healthy eating habits. Careful eating habits lifelong are the mainstay of weight control. Though there are significant health benefits from weight loss, long-term weight loss with diet alone may be very difficult to achieve- studies show long-term success with dietary management in less than 10% of people. Attaining a healthy weight may be especially difficult to achieve in those with severe obesity. In some cases, medications, devices and surgical management might be considered.  What can you do?  If you are overweight or obese and are interested in methods for weight loss, you should discuss this with your provider.     Consider reducing daily calorie intake by 500 calories.     Keep a food journal.     Avoiding skipping meals, consider cutting portions instead.    Diet combined with exercise helps maintain muscle while optimizing fat loss. Strength training is particularly important for building and maintaining muscle mass. Exercise helps reduce stress, increase energy, and improves fitness.  Increasing exercise without diet control, however, may not burn enough calories to loose weight.       Start walking three days a week 10-20 minutes at a time    Work towards walking thirty minutes five days a week     Eventually, increase the speed of your walking for 1-2 minutes at time    In addition, we recommend that you review healthy lifestyles and methods for weight loss available through the National Institutes of Health patient information sites:  http://win.niddk.nih.gov/publications/index.htm    And look into health and wellness programs that may be available through your health insurance provider, employer, local community center, or phyllis club.    Weight management plan: Patient was referred to their PCP to discuss a diet and exercise plan.  MY CONTACT NUMBERS ARE:   DOCTOR : TARAN Delgadillo  SLEEP CENTER :   CPAP EQUIPMENT :    IF I HAVE SLEEP APNEA.....  WHERE CAN I FIND MORE INFORMATION?    American Academy of Sleep Medicine Patient information on sleep disorders:  http://yoursleep.aasmnet.org    THINGS TO REMEMBER  In most situations, sleep apnea is a lifelong disease that must be managed with daily therapy. Untreated disease, when severe, may result in an increased risk for an array of problems from heart disease to mood changes, car accidents and shorter lifespan.    CPAP -  WHY AND HOW?  Continuous positive airway pressure, or CPAP, is the most effective treatment for obstructive sleep apnea. A decision to use CPAP is a major step forward in the pursuit of a healthier life. The successful use of CPAP will help you breathe easier, sleep better and live healthier. Using CPAP can be a positive experience if you keep these medina points in mind:  1. Commitment  CPAP is not a quick fix for your problem. It involves a long-term commitment to improve your sleep and your health.    2. Communication  Stay in close communication with both your sleep doctor and your CPAP supplier. Ask lots of questions and  "seek help when you need it.    3. Consistency  Use CPAP all night, every night and for every nap. You will receive the maximum health benefits from CPAP when you use it every time that you sleep. This will also make it easier for your body to adjust to the treatment.    4. Correction  The first machine and mask that you try may not be the best ones for you. Work with your sleep doctor and your CPAP supplier to make corrections to your equipment selection. Ask about trying a different type of machine or mask if you have ongoing problems. Make sure that your mask is a good fit and learn to use your equipment properly.    5. Challenge  Tell a family member or close friend to ask you each morning if you used your CPAP the previous night. Have someone to challenge you to give it your best effort.    6. Connection   Your adjustment to CPAP will be easier if you are able to connect with others who use the same treatment. Ask your sleep doctor if there is a support group in your area for people who have sleep apnea, or look for one on the Internet.    7. Comfort   Increase your level of comfort by using a saline spray, decongestant or heated humidifier if CPAP irritates your nose, mouth or throat. Use your unit's \"ramp\" setting to slowly get used to the air pressure level. There may be soft pads you can buy that will fit over your mask straps. Look on www.CPAP.com for accessories such as these straps, a pillow contoured for side-sleeping with CPAP, longer hoses, hose covers to reduce condensation, or stands to keep the hose out of your way.                                 8. Cleaning   Clean your mask, tubing and headgear on a regular basis. Put this time in your schedule so that you don't forget to do it. Check and replace the filters for your CPAP unit and humidifier.    9. Completion   Although you are never finished with CPAP therapy, you should reward yourself by celebrating the completion of your first month of " treatment. Expect this first month to be your hardest period of adjustment. It will involve some trial and error as you find the machine, mask and pressure settings that are right for you.    Continuation  After your first month of treatment, continue to make a daily commitment to use your CPAP all night, every night and for every nap.    CPAP-Tips to starting with success:  Begin using your CPAP for short periods of time during the day while you watch TV or read.    Use CPAP every night and for every nap. Using it less often reduces the health benefits and makes it harder for your body to get used to it.    Newer CPAP models are virtually silent; however, if you find the sound of your CPAP machine to be bothersome, place the unit under your bed to dampen the sound.     Make small adjustments to your mask, tubing, straps and headgear until you get the right fit. Tightening the mask may actually worsen the leak.  If it leaves significant marks on your face or irritates the bridge of your nose, it may not be the best mask for you.  Speak with the person who supplied the mask and consider trying other masks.    Use a saline nasal spray to ease mild nasal congestion. Neti-Pot or saline nasal rinses may also help. Nasal gel sprays can help reduce nasal dryness.  Biotene mouthwash can be helpful to protect your teeth if you experience frequent dry mouth.  Dry mouth may be a sign of air escaping out of your mouth or out of the mask in the case of a full face mask.  Speak with your provider if you expect that is the case.     Take a nasal decongestant to relieve more severe nasal or sinus congestion.  Do not use Afrin (oxymetazoline) nasal spray more than 3 days in a row.  Speak with your sleep doctor if your nasal congestion is chronic.    Use a heated humidifier that fits your CPAP model to enhance your breathing comfort. Adjust the heat setting up if you get a dry nose or throat, down if you get condensation in the hose  or mask.  Position the CPAP lower than you so that any condensation in the hose drains back into the machine rather than towards the mask.    Try a system that uses nasal pillows if traditional masks give you problems.    Clean your mask, tubing and headgear once a week. Make sure the equipment dries fully.    Regularly check and replace the filters for your CPAP unit and humidifier.    Work closely with your sleep doctor and your CPAP supplier to make sure that you have the machine, mask and air pressure setting that works best for you.    BESIDES CPAP, WHAT OTHER THERAPIES ARE THERE?    Postioning devices if you only have the problem on your back    Dental devices if your condition is mild    Nasal valves may be effective though experience is limited    Tongue Retaining Device if missing teeth precludes the use of a dental device    Weight loss if you are overweight    Surgery in limited cases where devices are not acceptable or there are problems with structures in the nose and throat  If treated with one of these alternative options, further evaluation is necessary to ensure that the therapy is effective. This may require some form of testing.     Healthy Lifestyle:  Healthy diet, exercise and Limit alcohol: Not only will excessive alcohol increase your weight over time, but it irritates the throat tissues and make them swell, shrinking the airway and causing snoring. Drinking alcohol should be limited and stopped within 3-4 hours before going to bed.   Stop smoking: (Red swollen throat, heat, nicotine), also irritates and swells the airway, among numerous other negative health consequences.    Positioning Device  This example shows a pillow that straps around the waist. It may be appropriate for those whose sleep study shows milder sleep apnea that occurs primarily when lying flat on one's back. Preliminary studies have shown benefit but effectiveness at home should be verified.    Nasal Valves               Nasal valves may not be effective if you have frequent nasal congestion or have difficulty breathing through your nose. They may be an option for mild apnea if other options are not well tolerated. The efficacy of these devices is generally less than CPAP or oral appliances.  Oral Appliance  These are examples of two of many custom-made devices that are more likely to work in mild sleep apnea  Oral appliances are dental mouth pieces that fit very much like a sports mouth guards or removable orthodontic retainers. They are used to treat snoring  And obstructive sleep apnea . The device prevents the airway from collapsing by either holding the tongue or supporting the jaw in a forward position. Since oral appliances are non-invasive and easy to use, they may be considered as an early treatment option. Oral appliance therapy (OAT) involves the customization, selection, fabrication, fitting, adjustments and long-term follow-up care of specially designed oral devices, worn during sleep, which reposition the lower jaw and tongue base forward to maintain an open airway.  Custom made oral appliances are proven to be more effective than over-the-counter devices. Therefore, the over-the-counter devices are recommended not to be used as a screening tool nor as a therapeutic option.  Who gets a dental device?  Oral appliance therapy can be used as an alternative to  CPAP therapy for the treatment of mild to moderate sleep apnea and for those patients who prefer OAT to CPAP. Oral appliance therapy is a first line therapy for the treatment of primary snoring. Additionally, OAT is an option for those that cannot tolerate CPAP as therapy or who have experienced insufficient surgical results.    Possible side effects?  Frequent but minor side effects include: excessive salivation, dry mouth, discomfort of teeth and jaw and temporary changes in the patient s bite.  Potential complications include: jaw pain, permanent occlusal  changes and TMJ symptoms.  The above mentioned side effects and complications can be recognized and managed by dentists trained in dental sleep medicine.    Finding a dentist that practices dental sleep medicine  Specific training is available through the American Academy of Dental Sleep Medicine for dentists interested in working in the field of sleep. To find a dentist who is educated in the field of sleep and the use of oral appliances, near you, visit the Web site of the American Academy of Dental Sleep Medicine; also see http://www.accpstorage.org/newOrganization/patients/oralAppliances.pdf   To search for a dentist certified in these practices:  Http://aadsm.org/FindADentist.aspx?1  Http://www.accpstorage.org/newOrganization/patients/oralAppliances.pdf    Tongue Retaining Device               Tongue Retaining Devices are devices that generally  suction cup  onto the tongue preventing it from falling back into the back of the throat during sleep.  They may be an option for people missing teeth, but can be uncomfortable. This particular device can be purchased online, but similar devices made by dentists fit more precisely and may be tolerated better. In general, they are rarely effective and often not very well tolerated.    Weight Loss:  Some patients may experience reduction or elimination of sleep apnea with weight loss.  Though there are significant health benefits from weight loss, long-term weight loss is very difficult to achieve- studies show success with dietary management in less that 10% of people.     If you are interested in dietary weight loss, you should review the options discussed at the National Institutes of Health patient information site:     Http:/www.health.nih.gov/topic/WeightLossDieting    Bariatric programs offer counseling in all methods of weight loss:    Http:/www.[facility].org/Specialties/WeightLossSurgeryandMedicalMgmt/htm    Surgery:  There are a number of surgeries that  "have been attempted to treat apnea. In general, surgical options are usually reserved for cases in which there is a physical abnormality contributing to obstruction or other treatment options are ineffective or not tolerated. Most surgical options are either unreliable or quite invasive. One of the more common procedures is:  Uvulopalatopharyngoplasty: In this procedure, the uvula (the finger-like tissue that hangs in the back of the throat), part of the soft palate (the tissue that the uvula is attached to), and sometimes the tonsils or adenoids are removed. The efficacy of this surgery is around 30-50% .  After surgery, complications may include:  Sleepiness and sleep apnea related to post-surgery medication   Swelling, infection and bleeding   A sore throat and/or difficulty swallowing   Drainage of secretions into the nose and a nasal quality to the voice. English language speech does not seem to be affected by this surgery.   Narrowing of the airway in the nose and throat (hence constricting breathing) snoring and even iatrogenically caused sleep apnea. By cutting the tissues, excess scar tissue can \"tighten\" the airway and make it even smaller than it was before UPPP.  Patients who have had the uvula removed will become unable to correctly speak Bahamian or any other language that has a uvular 'r' phoneme.    Surgeries to help resolve nasal congestion may help reduce the severity of apnea slightly. Nasal congestion does not cause apnea on its own, so these surgeries are usually not performed just for STELLA.  They may be worth considering if the nasal congestion is significantly bothersome independent of apnea.    "

## 2021-03-25 ENCOUNTER — IMMUNIZATION (OUTPATIENT)
Dept: PEDIATRICS | Facility: CLINIC | Age: 46
End: 2021-03-25
Payer: COMMERCIAL

## 2021-03-25 PROCEDURE — 0001A PR COVID VAC PFIZER DIL RECON 30 MCG/0.3 ML IM: CPT

## 2021-03-25 PROCEDURE — 91300 PR COVID VAC PFIZER DIL RECON 30 MCG/0.3 ML IM: CPT

## 2021-03-31 ENCOUNTER — TELEPHONE (OUTPATIENT)
Dept: SLEEP MEDICINE | Facility: CLINIC | Age: 46
End: 2021-03-31

## 2021-03-31 NOTE — TELEPHONE ENCOUNTER
Reason for call:  Other   Patient called regarding (reason for call): call back  Additional comments: Patient is requesting a call back to schedule sleep study     Phone number to reach patient:  Cell number on file:    Telephone Information:   Mobile [phone]       Best Time:  Anytime    Can we leave a detailed message on this number?  YES    Travel screening: Not Applicable

## 2021-04-15 ENCOUNTER — IMMUNIZATION (OUTPATIENT)
Dept: PEDIATRICS | Facility: CLINIC | Age: 46
End: 2021-04-15
Attending: INTERNAL MEDICINE
Payer: COMMERCIAL

## 2021-04-15 PROCEDURE — 91300 PR COVID VAC PFIZER DIL RECON 30 MCG/0.3 ML IM: CPT

## 2021-04-15 PROCEDURE — 0002A PR COVID VAC PFIZER DIL RECON 30 MCG/0.3 ML IM: CPT

## 2021-04-29 ENCOUNTER — MYC MEDICAL ADVICE (OUTPATIENT)
Dept: SLEEP MEDICINE | Facility: CLINIC | Age: 46
End: 2021-04-29

## 2021-04-29 DIAGNOSIS — I10 HYPERTENSION GOAL BP (BLOOD PRESSURE) < 140/90: ICD-10-CM

## 2021-04-29 NOTE — TELEPHONE ENCOUNTER
Routing refill request to provider for review/approval because:  BP not at goal        BP Readings from Last 3 Encounters:   03/17/21 (!) 151/97   03/03/21 (!) 150/98   12/16/19 (!) 142/83

## 2021-04-30 ENCOUNTER — TELEPHONE (OUTPATIENT)
Dept: SLEEP MEDICINE | Facility: CLINIC | Age: 46
End: 2021-04-30

## 2021-04-30 RX ORDER — HYDROCHLOROTHIAZIDE 25 MG/1
TABLET ORAL
Qty: 30 TABLET | Refills: 1 | Status: SHIPPED | OUTPATIENT
Start: 2021-04-30 | End: 2022-09-27

## 2021-04-30 NOTE — TELEPHONE ENCOUNTER
Reason for Call:  Other appointment    Detailed comments: patient would like to re schedule her sleep study appointmen at  [facility]EP [facility].  Please contact patient.  Thank you.    Phone Number Patient can be reached at: Home number on file [phone] (home)    Best Time: any    Can we leave a detailed message on this number? YES    Call taken on 4/30/2021 at 12:45 PM by Aileen Hankins

## 2021-04-30 NOTE — TELEPHONE ENCOUNTER
New BP med as of 3/3/21.   Patient due for BP check.   Please have her schedule an ancillary visit for BP check (no charge visit).   Refill x 1 month sent.

## 2021-04-30 NOTE — TELEPHONE ENCOUNTER
Patient informed of directives from Dr. Ho &  verbalized a good understanding.   Ancillary Blood Pressure  check on 5/5/21.    Bertha Guerrero RN BSN  [facility]

## 2021-05-05 ENCOUNTER — ALLIED HEALTH/NURSE VISIT (OUTPATIENT)
Dept: NURSING | Facility: CLINIC | Age: 46
End: 2021-05-05
Payer: COMMERCIAL

## 2021-05-05 VITALS — DIASTOLIC BLOOD PRESSURE: 94 MMHG | SYSTOLIC BLOOD PRESSURE: 143 MMHG | HEART RATE: 75 BPM

## 2021-05-05 DIAGNOSIS — Z01.30 BLOOD PRESSURE CHECK: Primary | ICD-10-CM

## 2021-05-05 PROCEDURE — 99207 PR NO CHARGE NURSE ONLY: CPT

## 2021-05-05 NOTE — PROGRESS NOTES
Stephanie Mccarthy is a 46 year old patient who comes in today for a Blood Pressure check.  Initial BP:  BP (!) 143/94 (BP Location: Right arm, Cuff Size: Adult Large)   Pulse 75      75  Disposition: follow-up as previously indicated by provider and results routed to provider

## 2021-05-26 ENCOUNTER — TELEPHONE (OUTPATIENT)
Dept: ENDOCRINOLOGY | Facility: CLINIC | Age: 46
End: 2021-05-26

## 2021-05-27 NOTE — TELEPHONE ENCOUNTER
Central Prior Authorization Team   Phone: [phone]      PA started; waiting for insurance to respond with clinical questions.

## 2021-06-01 NOTE — TELEPHONE ENCOUNTER
Central Prior Authorization Team   Phone: [phone]      INS stated they did not receive the PA request via CMM. New PA initiated and approved via phone with Kaylee from [facility].      Prior Authorization Approval    Authorization Effective Date: 5/2/2021  Authorization Expiration Date: 6/1/2022  Medication: Phentermine HCL 30 MG Capsules - APPROVED  Approved Dose/Quantity: 30 FOR 30  Reference #: [id number]   Insurance Company: [employer] - Phone [phone] Fax [phone]  Expected CoPay:       CoPay Card Available:      Foundation Assistance Needed:    Which Pharmacy is filling the prescription (Not needed for infusion/clinic administered): [id number] - [place] - [address] [place] AT [facility] [address] [facility] [facility]  Pharmacy Notified: Yes  Patient Notified: Yes (**Instructed pharmacy to notify patient when script is ready to /ship.**)

## 2021-06-17 ENCOUNTER — THERAPY VISIT (OUTPATIENT)
Dept: SLEEP MEDICINE | Facility: CLINIC | Age: 46
End: 2021-06-17
Payer: COMMERCIAL

## 2021-06-17 DIAGNOSIS — R53.81 MALAISE AND FATIGUE: ICD-10-CM

## 2021-06-17 DIAGNOSIS — R06.83 SNORING: ICD-10-CM

## 2021-06-17 DIAGNOSIS — R53.83 MALAISE AND FATIGUE: ICD-10-CM

## 2021-06-17 DIAGNOSIS — R06.89 DYSPNEA AND RESPIRATORY ABNORMALITY: ICD-10-CM

## 2021-06-17 DIAGNOSIS — E66.01 CLASS 3 SEVERE OBESITY DUE TO EXCESS CALORIES WITH SERIOUS COMORBIDITY AND BODY MASS INDEX (BMI) OF 45.0 TO 49.9 IN ADULT (H): ICD-10-CM

## 2021-06-17 DIAGNOSIS — G47.30 SLEEP APNEA, UNSPECIFIED TYPE: ICD-10-CM

## 2021-06-17 DIAGNOSIS — I10 ESSENTIAL HYPERTENSION: ICD-10-CM

## 2021-06-17 DIAGNOSIS — G47.19 EXCESSIVE DAYTIME SLEEPINESS: ICD-10-CM

## 2021-06-17 DIAGNOSIS — R06.00 DYSPNEA AND RESPIRATORY ABNORMALITY: ICD-10-CM

## 2021-06-17 DIAGNOSIS — E66.813 CLASS 3 SEVERE OBESITY DUE TO EXCESS CALORIES WITH SERIOUS COMORBIDITY AND BODY MASS INDEX (BMI) OF 45.0 TO 49.9 IN ADULT (H): ICD-10-CM

## 2021-06-17 DIAGNOSIS — Z72.820 LACK OF ADEQUATE SLEEP: ICD-10-CM

## 2021-06-17 PROCEDURE — 95810 POLYSOM 6/> YRS 4/> PARAM: CPT | Performed by: INTERNAL MEDICINE

## 2021-06-18 PROBLEM — I10 ESSENTIAL HYPERTENSION: Chronic | Status: ACTIVE | Noted: 2021-03-19

## 2021-06-18 PROBLEM — G47.33 OSA (OBSTRUCTIVE SLEEP APNEA): Chronic | Status: ACTIVE | Noted: 2021-06-18

## 2021-06-18 NOTE — PROCEDURES
" SLEEP STUDY INTERPRETATION  DIAGNOSTIC POLYSOMNOGRAPHY REPORT      Patient: BRITTANY LAWRENCE  YOB: 1975  Study Date: 6/17/2021  MRN: [id number]  Referring Provider: Cuca Ho MD  Ordering Provider: Cyndee Barillas PA-C    Indications for Polysomnography: The patient is a 46 year old Female who is 5' 3\" and weighs 255.0 lbs. Her BMI is 45.2, [place] sleepiness scale 16 and neck circumference is 42 cm. A diagnostic polysomnogram was performed to evaluate for probable obstructive sleep apnea with possible coexisting hypoventilation based on BMI of 45.17 kg/m , loud snoring, witnessed apnea, difficulty maintaining sleep, non-refreshing sleep, excessive daytime sleepiness (ESS 16) and co-morbid HTN.    Polysomnogram Data: A full night polysomnogram recorded the standard physiologic parameters including EEG, EOG, EMG, ECG, nasal and oral airflow. Respiratory parameters of chest and abdominal movements were recorded with respiratory inductance plethysmography. Oxygen saturation was recorded by pulse oximetry. Hypopnea scoring rule used: 1B 4%.    Sleep Architecture:   The total recording time of the polysomnogram was 506.0 minutes. The total sleep time was 455.0 minutes. Sleep latency was normal at 10.0 minutes with the use of a sleep aid (zolpidem). REM latency was 97.5 minutes. Arousal index was increased at 75.0 arousals per hour. Sleep efficiency was normal at 89.9%. Wake after sleep onset was 40.5 minutes. The patient spent 11.0% of total sleep time in Stage N1, 60.8% in Stage N2, 15.4% in Stage N3, and 12.9% in REM. Time in REM supine was 24.5 minutes.    Respiration:     Events ? The polysomnogram revealed a presence of 146 obstructive, 52 central, and 17 mixed apneas resulting in an apnea index of 28.4 events per hour. There were 199 obstructive hypopneas and 0 central hypopneas resulting in an obstructive hypopnea index of 26.2 and central hypopnea index of 0 events per hour. The combined " apnea/hypopnea index was 54.6 events per hour (central apnea/hypopnea index was 6.9 events per hour). The REM AHI was 101.5 events per hour. The supine AHI was 63.0 events per hour. The RERA index was 18.9 events per hour.  The RDI was 73.5 events per hour.    Snoring - was reported as moderate to loud.    Respiratory rate and pattern - was notable for normal respiratory rate and pattern.    Sustained Sleep Associated Hypoventilation - Transcutaneous carbon dioxide monitoring was used, however severe  hypoventilation was not suggested by oximetry and TCM maximum change from 35-37 to 46 mmHg with 0 minutes at or greater than 55 mmHg.    Sleep Associated Hypoxemia - (Greater than 5 minutes O2 sat at or below 88%) was present. Baseline oxygen saturation was 96.0%. Lowest oxygen saturation was 70.5%. Time spent less than or equal to 88% was 21.0 minutes. Time spent less than or equal to 89% was 24.7 minutes.    Movement Activity:     Periodic Limb Activity - There were 70 PLMs during the entire study. The PLM index was 9.2 movements per hour. The PLM Arousal Index was 1.5 per hour.    REM EMG Activity - Excessive transient/sustained muscle activity was not present.    Nocturnal Behavior - Abnormal sleep related behaviors were not noted     Bruxism - None apparent.    Cardiac Summary:   The average pulse rate was 73.1 bpm. The minimum pulse rate was 47.9 bpm while the maximum pulse rate was 98.4 bpm.  Arrhythmias were not noted.      Assessment:     Severe obstructive sleep apnea with hypoxemia    Recommendations:    Consider repeat polysomnography with full night titration of positive airway pressure therapy for the control of sleep disordered breathing.    Based on the presence of covid 19 restrictions, treatment could be empirically initiated with Auto?titrating PAP therapy with a range of 7 to 18 cmH2O. Recommend clinical follow up with sleep management team.    Patient may be a candidate for dental appliance through  referral to Sleep Dentistry for the treatment of obstructive sleep apnea and/or socially disruptive snoring.    If devices are not acceptable or effective, patient may benefit from evaluation of possible surgical options. If she is interested, would recommend referral to specialized ENT-Sleep provider.    Advice regarding the risks of drowsy driving.    Suggest optimizing sleep schedule and avoiding sleep deprivation.    Weight management (if BMI > 30).    Pharmacologic therapy should be used for management of restless legs syndrome only if present and clinically indicated and not based on the presence of periodic limb movements alone.    Diagnostic Codes:   Obstructive Sleep Apnea G47.33  Sleep Hypoxemia/Hypoventilation G47.36         _____________________________________   Electronically Signed By: Misael Naylor MD 6/18/21           Range(%) Time in range (min)   0.0 - 89.0 24.7   0.0 - 88.0 21.0         Stage Min(mm Hg) Max(mm Hg)   Wake 34.8 46.7   NREM(1+2+3) 29.6 46.2   REM 38.4 46.9       Range(mmHg) Time in range (min)   55.0 - 100.0 -   Excluded data <20.0 & >65.0 19.8

## 2021-06-22 LAB — SLPCOMP: NORMAL

## 2021-06-28 ENCOUNTER — VIRTUAL VISIT (OUTPATIENT)
Dept: ENDOCRINOLOGY | Facility: CLINIC | Age: 46
End: 2021-06-28
Payer: COMMERCIAL

## 2021-06-28 VITALS — WEIGHT: 252 LBS | BODY MASS INDEX: 44.65 KG/M2 | HEIGHT: 63 IN

## 2021-06-28 DIAGNOSIS — E66.01 MORBID OBESITY (H): Primary | ICD-10-CM

## 2021-06-28 PROCEDURE — 99213 OFFICE O/P EST LOW 20 MIN: CPT | Mod: 95 | Performed by: INTERNAL MEDICINE

## 2021-06-28 RX ORDER — TOPIRAMATE 50 MG/1
75 TABLET, FILM COATED ORAL 2 TIMES DAILY
Qty: 360 TABLET | Refills: 11 | Status: SHIPPED | OUTPATIENT
Start: 2021-06-28 | End: 2022-06-27

## 2021-06-28 RX ORDER — TOPIRAMATE 50 MG/1
TABLET, FILM COATED ORAL
COMMUNITY
Start: 2021-05-24 | End: 2021-06-28

## 2021-06-28 RX ORDER — PHENTERMINE HYDROCHLORIDE 30 MG/1
30 CAPSULE ORAL EVERY MORNING
Qty: 30 CAPSULE | Refills: 5 | Status: SHIPPED | OUTPATIENT
Start: 2021-06-28 | End: 2021-12-13

## 2021-06-28 RX ORDER — PHENTERMINE HYDROCHLORIDE 30 MG/1
CAPSULE ORAL
COMMUNITY
Start: 2021-06-01 | End: 2021-06-28

## 2021-06-28 ASSESSMENT — MIFFLIN-ST. JEOR: SCORE: 1752.19

## 2021-06-28 ASSESSMENT — PAIN SCALES - GENERAL: PAINLEVEL: NO PAIN (0)

## 2021-06-28 NOTE — PROGRESS NOTES
"    Return Medical Weight Management Note     Stephanie Mccarthy  MRN:  [id number]  :  1975  YAHAIRA:  21    Dear [facility],    I had the pleasure of seeing your patient Stephanie Mccarthy.  She is a 42 year old female who I am continuing to see for treatment of obesity.    CURRENT WEIGHT:   252 lbs 0 oz    Wt Readings from Last 4 Encounters:   21 114.3 kg (252 lb)   21 115.7 kg (255 lb)   21 115.8 kg (255 lb 6.4 oz)   21 120.2 kg (265 lb)     Height:  5' 3\"  Body Mass Index:  Body mass index is 44.64 kg/m .  Vitals:  B/P: 170/109, P: 90    Initial consult weight was 264 lbs on .  Weight change since last visit 21 is down 13 pounds.   Total loss is 12 pounds.    INTERVAL HISTORY:  Feels like medications are helping. Busy with work and personal life.  Struggling with snacks at night.     No flowsheet data found.    MEDICATIONS:   Current Outpatient Medications   Medication     hydrochlorothiazide (HYDRODIURIL) 25 MG tablet     phentermine (ADIPEX-P) 30 MG capsule     topiramate (TOPAMAX) 25 MG tablet     topiramate (TOPAMAX) 50 MG tablet     zolpidem (AMBIEN) 5 MG tablet     No current facility-administered medications for this visit.      Weight Loss Medication History Reviewed With Patient 2021   Which weight loss medications are you currently taking on a regular basis?  Phentermine   If you are not taking a weight loss medication that was prescribed to you, please indicate why: -   Are you having any side effects from the weight loss medication that we have prescribed you? No   If you are having side effects please describe: -     Ht 1.6 m (5' 3\")   Wt 114.3 kg (252 lb)   BMI 44.64 kg/m      ASSESSMENT:   Re-focus on food plan with focus on no between meal snacking, aggressive lowering of starches and cheese and increase phentermine to 30 AM and and increase topiramate to 75 AM and suppertime.     FOLLOW-UP:    3 months  Video call duration: 15 minutes. " " I explained the conditions and plans (more than 50% of time was counseling/coordination of weight management).    Rober Nam MD     The patient was evaluated via a billable video visit and notified \"This visit will be via video call between you and your physician. If lab work is needed we can place an order and you can later stop by the lab. Video visits are billed at different rates depending on your insurance coverage.  Please reach out to your insurance provider with any questions. If the physician feels a video visit is not appropriate, you will not be charged for this service.\" Patient gave verbal consent for Video visit and would you like to obtain AVS by [employer].  "

## 2021-06-28 NOTE — NURSING NOTE
"Chief Complaint   Patient presents with     Follow Up     [facility] follow up       Vitals:    06/28/21 0758   Weight: 114.3 kg (252 lb)   Height: 1.6 m (5' 3\")       Body mass index is 44.64 kg/m .                              "

## 2021-06-28 NOTE — PROGRESS NOTES
Renate is a 46 year old who is being evaluated via a billable video visit.      How would you like to obtain your AVS? MyChart  If the video visit is dropped, the invitation should be resent by: Text to cell phone: [phone]  Will anyone else be joining your video visit? No     During this virtual visit the patient is located in [place], patient verifies this as the location during the entirety of this visit.     Video-Visit Details    Type of service:  Video Visit    Video call duration: 15 minutes.  I explained the conditions and plans (more than 50% of time was counseling/coordination of weight management).    Originating Location (pt. Location): Home    Distant Location (provider location):  [facility] WE[facility]NAG[facility] [place]     Platform used for Video Visit: Visual Pro 360

## 2021-06-28 NOTE — LETTER
"2021       RE: Stephanie Mccarthy  [address] [address] [address]     Dear Colleague,    Thank you for referring your patient, Stephanie Mccarthy, to the [facility] WEIGHT MANAGEMENT [facility] [place] at [facility]. Please see a copy of my visit note below.    Renate is a 46 year old who is being evaluated via a billable video visit.      How would you like to obtain your AVS? MyChart  If the video visit is dropped, the invitation should be resent by: Text to cell phone: [phone]  Will anyone else be joining your video visit? No     During this virtual visit the patient is located in [place], patient verifies this as the location during the entirety of this visit.     Video-Visit Details    Type of service:  Video Visit    Video call duration: 15 minutes.  I explained the conditions and plans (more than 50% of time was counseling/coordination of weight management).    Originating Location (pt. Location): Home    Distant Location (provider location):  [facility] WEIGHT MANAGEMENT CLINIC [place]     Platform used for Video Visit: So[employer]oz          [facility] Medical Weight Management Note     Stephanie Mccarthy  MRN:  [id number]  :  1975  YAHAIRA:  21    Dear [facility],    I had the pleasure of seeing your patient Stephanie Mccarthy.  She is a 42 year old female who I am continuing to see for treatment of obesity.    CURRENT WEIGHT:   252 lbs 0 oz    Wt Readings from Last 4 Encounters:   21 114.3 kg (252 lb)   21 115.7 kg (255 lb)   21 115.8 kg (255 lb 6.4 oz)   21 120.2 kg (265 lb)     Height:  5' 3\"  Body Mass Index:  Body mass index is 44.64 kg/m .  Vitals:  B/P: 170/109, P: 90    Initial consult weight was 264 lbs on .  Weight change since last visit 21 is down 13 pounds.   Total loss is 12 pounds.    INTERVAL HISTORY:  Feels like medications are helping. Busy with work and personal life. " " Struggling with snacks at night.     No flowsheet data found.    MEDICATIONS:   Current Outpatient Medications   Medication     hydrochlorothiazide (HYDRODIURIL) 25 MG tablet     phentermine (ADIPEX-P) 30 MG capsule     topiramate (TOPAMAX) 25 MG tablet     topiramate (TOPAMAX) 50 MG tablet     zolpidem (AMBIEN) 5 MG tablet     No current facility-administered medications for this visit.      Weight Loss Medication History Reviewed With Patient 6/24/2021   Which weight loss medications are you currently taking on a regular basis?  Phentermine   If you are not taking a weight loss medication that was prescribed to you, please indicate why: -   Are you having any side effects from the weight loss medication that we have prescribed you? No   If you are having side effects please describe: -     Ht 1.6 m (5' 3\")   Wt 114.3 kg (252 lb)   BMI 44.64 kg/m      ASSESSMENT:   Re-focus on food plan with focus on no between meal snacking, aggressive lowering of starches and cheese and increase phentermine to 30 AM and and increase topiramate to 75 AM and suppertime.     FOLLOW-UP:    3 months  Video call duration: 15 minutes.  I explained the conditions and plans (more than 50% of time was counseling/coordination of weight management).    Rober Nam MD     The patient was evaluated via a billable video visit and notified \"This visit will be via video call between you and your physician. If lab work is needed we can place an order and you can later stop by the lab. Video visits are billed at different rates depending on your insurance coverage.  Please reach out to your insurance provider with any questions. If the physician feels a video visit is not appropriate, you will not be charged for this service.\" Patient gave verbal consent for Video visit and would you like to obtain AVS by [employer].      "

## 2021-06-30 DIAGNOSIS — R06.00 DYSPNEA AND RESPIRATORY ABNORMALITY: ICD-10-CM

## 2021-06-30 DIAGNOSIS — Z13.220 LIPID SCREENING: ICD-10-CM

## 2021-06-30 DIAGNOSIS — R06.89 DYSPNEA AND RESPIRATORY ABNORMALITY: ICD-10-CM

## 2021-06-30 DIAGNOSIS — G47.30 SLEEP APNEA, UNSPECIFIED TYPE: ICD-10-CM

## 2021-06-30 LAB
CO2 BLDCOV-SCNC: 24 MMOL/L (ref 21–28)
PCO2 BLDV: 42 MM HG (ref 40–50)
PH BLDV: 7.37 PH (ref 7.32–7.43)
PO2 BLDV: 30 MM HG (ref 25–47)
SAO2 % BLDV FROM PO2: 56 %

## 2021-06-30 PROCEDURE — 82803 BLOOD GASES ANY COMBINATION: CPT | Performed by: INTERNAL MEDICINE

## 2021-06-30 PROCEDURE — 36415 COLL VENOUS BLD VENIPUNCTURE: CPT | Performed by: INTERNAL MEDICINE

## 2021-07-02 VITALS — HEIGHT: 63 IN | WEIGHT: 255 LBS | BODY MASS INDEX: 45.18 KG/M2

## 2021-07-02 ASSESSMENT — SLEEP AND FATIGUE QUESTIONNAIRES
HOW LIKELY ARE YOU TO NOD OFF OR FALL ASLEEP WHILE WATCHING TV: MODERATE CHANCE OF DOZING
HOW LIKELY ARE YOU TO NOD OFF OR FALL ASLEEP WHILE SITTING AND TALKING TO SOMEONE: WOULD NEVER DOZE
HOW LIKELY ARE YOU TO NOD OFF OR FALL ASLEEP WHEN YOU ARE A PASSENGER IN A CAR FOR AN HOUR WITHOUT A BREAK: HIGH CHANCE OF DOZING
HOW LIKELY ARE YOU TO NOD OFF OR FALL ASLEEP WHILE SITTING INACTIVE IN A PUBLIC PLACE: MODERATE CHANCE OF DOZING
HOW LIKELY ARE YOU TO NOD OFF OR FALL ASLEEP WHILE LYING DOWN TO REST IN THE AFTERNOON WHEN CIRCUMSTANCES PERMIT: HIGH CHANCE OF DOZING
HOW LIKELY ARE YOU TO NOD OFF OR FALL ASLEEP IN A CAR, WHILE STOPPED FOR A FEW MINUTES IN TRAFFIC: WOULD NEVER DOZE
HOW LIKELY ARE YOU TO NOD OFF OR FALL ASLEEP WHILE SITTING AND READING: HIGH CHANCE OF DOZING
HOW LIKELY ARE YOU TO NOD OFF OR FALL ASLEEP WHILE SITTING QUIETLY AFTER LUNCH WITHOUT ALCOHOL: HIGH CHANCE OF DOZING

## 2021-07-02 ASSESSMENT — MIFFLIN-ST. JEOR: SCORE: 1765.8

## 2021-07-02 NOTE — PROGRESS NOTES
Renate is a 46 year old who is being evaluated via a billable video visit.      How would you like to obtain your AVS? MyChart  If the video visit is dropped, the invitation should be resent by: Text to cell phone: [phone]  Will anyone else be joining your video visit? No      Video Start Time: 1:01 PM  Video-Visit Details    Type of service:  Video Visit    Video End Time:1:19 PM    Originating Location (pt. Location): Home    Distant Location (provider location):  [facility] SLEEP [facility]     Platform used for Video Visit: Zachary MACK CMA, SLEEP MEDICINE, 7/2/2021 1:47 PM]    Sleep Study Follow-Up Visit:    Date on this visit: 7/5/2021    Stephanie Mccarthy comes in today for follow-up of her sleep study done on 6/17/ at the [facility]. A diagnostic polysomnogram was performed to evaluate for probable obstructive sleep apnea with possible coexisting hypoventilation based on BMI of 45.17 kg/m , loud snoring, witnessed apnea, difficulty maintaining sleep, non-refreshing sleep, excessive daytime sleepiness (ESS 16) and co-morbid HTN.    Polysomnogram interpreted by Dr Naylor:  Polysomnogram Data: A full night polysomnogram recorded the standard physiologic parameters including EEG, EOG, EMG, ECG, nasal and oral airflow. Respiratory parameters of chest and abdominal movements were recorded with respiratory inductance plethysmography. Oxygen saturation was recorded by pulse oximetry. Hypopnea scoring rule used: 1B 4%.     Sleep Architecture:   The total recording time of the polysomnogram was 506.0 minutes. The total sleep time was 455.0 minutes. Sleep latency was normal at 10.0 minutes with the use of a sleep aid (zolpidem). REM latency was 97.5 minutes. Arousal index was increased at 75.0 arousals per hour. Sleep efficiency was normal at 89.9%. Wake after sleep onset was 40.5 minutes. The patient spent 11.0% of total sleep time in Stage N1, 60.8% in Stage  N2, 15.4% in Stage N3, and 12.9% in REM. Time in REM supine was 24.5 minutes.     Respiration:     Events ? The polysomnogram revealed a presence of 146 obstructive, 52 central, and 17 mixed apneas resulting in an apnea index of 28.4 events per hour. There were 199 obstructive hypopneas and 0 central hypopneas resulting in an obstructive hypopnea index of 26.2 and central hypopnea index of 0 events per hour. The combined apnea/hypopnea index was 54.6 events per hour (central apnea/hypopnea index was 6.9 events per hour). The REM AHI was 101.5 events per hour. The supine AHI was 63.0 events per hour. The RERA index was 18.9 events per hour.  The RDI was 73.5 events per hour.    Snoring - was reported as moderate to loud.    Respiratory rate and pattern - was notable for normal respiratory rate and pattern.    Sustained Sleep Associated Hypoventilation - Transcutaneous carbon dioxide monitoring was used, however severe  hypoventilation was not suggested by oximetry and TCM maximum change from 35-37 to 46 mmHg with 0 minutes at or greater than 55 mmHg.    Sleep Associated Hypoxemia - (Greater than 5 minutes O2 sat at or below 88%) was present. Baseline oxygen saturation was 96.0%. Lowest oxygen saturation was 70.5%. Time spent less than or equal to 88% was 21.0 minutes. Time spent less than or equal to 89% was 24.7 minutes.     Movement Activity:     Periodic Limb Activity - There were 70 PLMs during the entire study. The PLM index was 9.2 movements per hour. The PLM Arousal Index was 1.5 per hour.    REM EMG Activity - Excessive transient/sustained muscle activity was not present.    Nocturnal Behavior - Abnormal sleep related behaviors were not noted     Bruxism - None apparent.     Cardiac Summary:   The average pulse rate was 73.1 bpm. The minimum pulse rate was 47.9 bpm while the maximum pulse rate was 98.4 bpm.  Arrhythmias were not noted.    Past medical/surgical history, family history, social history,  medications and allergies were reviewed.      Problem List:  Patient Active Problem List    Diagnosis Date Noted     STELLA (obstructive sleep apnea)- severe (AHI 54) 06/18/2021     Priority: Medium     6/17/2021 [place] Diagnostic Sleep Study (255.0 lbs) - AHI 54.6, RDI 73.5, Supine AHI 63.0, REM .5, Low O2 70.5%, Time Spent ?88% 21.0 minutes / Time Spent ?89% 24.7 minutes.Ssevere  hypoventilation was not suggested by oximetry and TCM maximum change from 35-37 to 46 mmHg with 0 minutes at or greater than 55 mmHg.       Essential hypertension 03/19/2021     Priority: Medium     Morbid obesity (H) 03/03/2021     Priority: Medium     Mild intermittent asthma 11/18/2013     Priority: Medium     Chondromalacia of patella 12/10/2012     Priority: Medium     Hx gestational diabetes 04/16/2012     Priority: Medium     IUD (intrauterine device) in place, paragard 11/2010 05/10/2011     Priority: Medium     Placed fall 2010       CARDIOVASCULAR SCREENING; LDL GOAL LESS THAN 160 10/31/2010     Priority: Medium     Acne 10/06/2010     Priority: Medium     Mild major depression (H) 03/31/2010     Priority: Medium     Anxiety 03/31/2010     Priority: Medium        Impression/Plan:    1. Severe STELLA with sleep-associated hypoxemia.   - Overnight polysomnogram was reviewed in detail today.  - Discussed this level of STELLA is associated with increase in long-term cardiovascular risk factors  - Treatment options for severe STELLA reviewed including PAP titration polysomnogram.  - Will arrange treatment with CPAP  8 cm/H20      She will follow up with me in about 8 week(s).     Cyndee Barillas PA-C

## 2021-07-02 NOTE — PATIENT INSTRUCTIONS
Your BMI is Body mass index is 45.17 kg/m .  Weight management is a personal decision.  If you are interested in exploring weight loss strategies, the following discussion covers the approaches that may be successful. Body mass index (BMI) is one way to tell whether you are at a healthy weight, overweight, or obese. It measures your weight in relation to your height.  A BMI of 18.5 to 24.9 is in the healthy range. A person with a BMI of 25 to 29.9 is considered overweight, and someone with a BMI of 30 or greater is considered obese. More than two-thirds of American adults are considered overweight or obese.  Being overweight or obese increases the risk for further weight gain. Excess weight may lead to heart disease and diabetes.  Creating and following plans for healthy eating and physical activity may help you improve your health.  Weight control is part of healthy lifestyle and includes exercise, emotional health, and healthy eating habits. Careful eating habits lifelong are the mainstay of weight control. Though there are significant health benefits from weight loss, long-term weight loss with diet alone may be very difficult to achieve- studies show long-term success with dietary management in less than 10% of people. Attaining a healthy weight may be especially difficult to achieve in those with severe obesity. In some cases, medications, devices and surgical management might be considered.  What can you do?  If you are overweight or obese and are interested in methods for weight loss, you should discuss this with your provider.     Consider reducing daily calorie intake by 500 calories.     Keep a food journal.     Avoiding skipping meals, consider cutting portions instead.    Diet combined with exercise helps maintain muscle while optimizing fat loss. Strength training is particularly important for building and maintaining muscle mass. Exercise helps reduce stress, increase energy, and improves fitness.  Increasing exercise without diet control, however, may not burn enough calories to loose weight.       Start walking three days a week 10-20 minutes at a time    Work towards walking thirty minutes five days a week     Eventually, increase the speed of your walking for 1-2 minutes at time    In addition, we recommend that you review healthy lifestyles and methods for weight loss available through the National Institutes of Health patient information sites:  http://win.niddk.nih.gov/publications/index.htm    And look into health and wellness programs that may be available through your health insurance provider, employer, local community center, or phyllis club.    Weight management plan: Discussed healthy diet and exercise guidelines

## 2021-07-05 ENCOUNTER — VIRTUAL VISIT (OUTPATIENT)
Dept: SLEEP MEDICINE | Facility: CLINIC | Age: 46
End: 2021-07-05
Payer: COMMERCIAL

## 2021-07-05 DIAGNOSIS — G47.33 OSA (OBSTRUCTIVE SLEEP APNEA): Primary | ICD-10-CM

## 2021-07-05 PROCEDURE — 99214 OFFICE O/P EST MOD 30 MIN: CPT | Mod: 95 | Performed by: PHYSICIAN ASSISTANT

## 2021-07-06 NOTE — NURSING NOTE
PAP follow up appointment scheduled. Please see chart for appointment information.       JUANITA Ewing  [facility]

## 2021-07-07 ENCOUNTER — TELEPHONE (OUTPATIENT)
Dept: SLEEP MEDICINE | Facility: CLINIC | Age: 46
End: 2021-07-07

## 2021-07-07 NOTE — TELEPHONE ENCOUNTER
CALLED PT AND SCHEDULED THE PT FOR A CPAP SETUP APPT AT THE [facility] 07/14/2021 2PM. CONFIRMED LOCATION, TIME AND DATE WITH THE PT

## 2021-07-14 ENCOUNTER — DOCUMENTATION ONLY (OUTPATIENT)
Dept: SLEEP MEDICINE | Facility: CLINIC | Age: 46
End: 2021-07-14

## 2021-07-14 DIAGNOSIS — G47.33 OSA (OBSTRUCTIVE SLEEP APNEA): ICD-10-CM

## 2021-07-16 NOTE — PROGRESS NOTES
Patient was offered choice of vendor and chose [facility].  Patient Stephanie Mccarthy was set up at [place]  on July 16, 2021. Patient received a Resmed AirSense 10 Auto. Pressures were set at 6-16 cm H2O.   Patient s ramp is OFF cm H2O for Off and FLEX/EPR is 2.  Patient received a Rosita Respironics Mask name: Dreamwisp  Nasal mask size Small, heated tubing and heated humidifier.  Patient does not need to meet compliance.   Dominga Alexander

## 2021-07-19 ENCOUNTER — DOCUMENTATION ONLY (OUTPATIENT)
Dept: SLEEP MEDICINE | Facility: CLINIC | Age: 46
End: 2021-07-19

## 2021-07-19 DIAGNOSIS — G47.33 OSA (OBSTRUCTIVE SLEEP APNEA): ICD-10-CM

## 2021-07-19 NOTE — PROGRESS NOTES
3 day Sleep therapy management telephone visit    Diagnostic AHI: 54.6    PSG    Confirmed with patient at time of call- Yes Patient is still interested in STM service       Subjective measures:  Patient feels things are going well so far.  She does feel at times the pressure is too.         Objective data     Order Settings for PAP  CPAP min 6    CPAP max 16        Device settings from machine CPAP min 6.0     CPAP max 16.0      EPR Setting TWO    RESMED soft response  OFF     Assessment: two nights of usage one night without usage.       Action plan: Patient to have 14 day STM visit. Patient has a follow up visit scheduled:   yes within 31-90 days of set up    Replacement device: No  STM ordered by provider: Yes     Total time spent on accessing and  interpreting remote patient PAP therapy data  12 minutes    Total time spent counseling, coaching  and reviewing PAP therapy data with patient  6 minutes    [id number] no

## 2021-07-29 ENCOUNTER — DOCUMENTATION ONLY (OUTPATIENT)
Dept: SLEEP MEDICINE | Facility: CLINIC | Age: 46
End: 2021-07-29

## 2021-07-29 DIAGNOSIS — G47.33 OSA (OBSTRUCTIVE SLEEP APNEA): ICD-10-CM

## 2021-07-29 NOTE — PROGRESS NOTES
14  DAY STM VISIT    Diagnostic AHI: 54.6    PSG    Message left for patient to return call     Assessment: Pt not meeting objective benchmarks for compliance       Action plan: waiting for patient to return call.  and pt to have 30 day STM visit.      Device type: Auto-CPAP    PAP settings: CPAP min 6.0 cm  H20       CPAP max 16.0 cm  H20     95th% pressure 7.5 cm  H20        RESMED EPR level Setting: TWO    RESMED Soft response setting:  OFF    Mask type:  Nasal Mask    Objective measures: 14 day rolling measures      Compliance  21 %      Leak  30.96  lpm  last  upload      AHI 1.56   last  upload      Average number of minutes 79      Objective measure goal  Compliance   Goal >70%  Leak   Goal < 24 lpm  AHI  Goal < 5  Usage  Goal >240        Total time spent on accessing and interpreting remote patient PAP therapy data  10 minutes    Total time spent counseling, coaching  and reviewing PAP therapy data with patient  0 minutes    [id number]  [id number]  no (3 day STM)

## 2021-08-16 ENCOUNTER — APPOINTMENT (OUTPATIENT)
Dept: SLEEP MEDICINE | Facility: CLINIC | Age: 46
End: 2021-08-16
Payer: COMMERCIAL

## 2021-09-01 ENCOUNTER — VIRTUAL VISIT (OUTPATIENT)
Dept: SLEEP MEDICINE | Facility: CLINIC | Age: 46
End: 2021-09-01
Payer: COMMERCIAL

## 2021-09-01 DIAGNOSIS — G47.33 OSA (OBSTRUCTIVE SLEEP APNEA): Primary | ICD-10-CM

## 2021-09-01 PROCEDURE — 99213 OFFICE O/P EST LOW 20 MIN: CPT | Mod: 95 | Performed by: PHYSICIAN ASSISTANT

## 2021-09-01 ASSESSMENT — SLEEP AND FATIGUE QUESTIONNAIRES
HOW LIKELY ARE YOU TO NOD OFF OR FALL ASLEEP WHILE SITTING AND READING: HIGH CHANCE OF DOZING
HOW LIKELY ARE YOU TO NOD OFF OR FALL ASLEEP IN A CAR, WHILE STOPPED FOR A FEW MINUTES IN TRAFFIC: WOULD NEVER DOZE
HOW LIKELY ARE YOU TO NOD OFF OR FALL ASLEEP WHEN YOU ARE A PASSENGER IN A CAR FOR AN HOUR WITHOUT A BREAK: HIGH CHANCE OF DOZING
HOW LIKELY ARE YOU TO NOD OFF OR FALL ASLEEP WHILE SITTING INACTIVE IN A PUBLIC PLACE: MODERATE CHANCE OF DOZING
HOW LIKELY ARE YOU TO NOD OFF OR FALL ASLEEP WHILE WATCHING TV: HIGH CHANCE OF DOZING
HOW LIKELY ARE YOU TO NOD OFF OR FALL ASLEEP WHILE LYING DOWN TO REST IN THE AFTERNOON WHEN CIRCUMSTANCES PERMIT: HIGH CHANCE OF DOZING
HOW LIKELY ARE YOU TO NOD OFF OR FALL ASLEEP WHILE SITTING QUIETLY AFTER LUNCH WITHOUT ALCOHOL: HIGH CHANCE OF DOZING
HOW LIKELY ARE YOU TO NOD OFF OR FALL ASLEEP WHILE SITTING AND TALKING TO SOMEONE: SLIGHT CHANCE OF DOZING

## 2021-09-01 NOTE — PROGRESS NOTES
Renate is a 46 year old who is being evaluated via a billable video visit.      How would you like to obtain your AVS? MyChart  If the video visit is dropped, the invitation should be resent by: Text to cell phone: [phone]  Will anyone else be joining your video visit? No      Video Start Time: 1:06 PM  Video-Visit Details    Type of service:  Video Visit    Video End Time:1:19 PM    Originating Location (pt. Location): Home    Distant Location (provider location):  [facility] SLEEP CLINIC [facility]     Platform used for Video Visit: VISUAL NACERT     Overall, the patient rates her experience with PAP as 10 (0 poor, 10 great). The mask is comfortable. The mask is not leaking.  She is not snoring with the mask on. She is not having gasp arousals. She is not having any oral or nasal dryness. The pressure settings are comfortable   Obstructive Sleep Apnea - PAP Follow-Up Visit:    Chief Complaint   Patient presents with     CPAP Follow Up       Stephanie Mccarthy comes in today for follow-up of their severe sleep apnea, managed with CPAP.     She presented with loud snoring, witnessed apnea, difficulty maintaining sleep, non-refreshing sleep, excessive daytime sleepiness (ESS 16) and co-morbid HTN.     6/17/2021 [place] Diagnostic Sleep Study (255.0 lbs) - AHI 54.6, RDI 73.5, Supine AHI 63.0, REM .5, Low O2 70.5%, Time Spent less than 88% 21.0 minutes / Time Spent less than 89% 24.7 minutes.Ssevere  hypoventilation was not suggested by oximetry and TCM maximum change from 35-37 to 46 mmHg with 0 minutes at or greater than 55 mmHg.    July 16, 2021. Patient received a Resmed AirSense 10 Auto. Pressures were set at 6-16 cm H2O.      Her PAP interface is Nasal Mask.    Bedtime is typically 8:30-10 PM. Usually it takes a few minutes to fall asleep with the mask on. Wake time is typically 5-7 AM.  Patient is using PAP therapy --- hours per night. The patient is usually getting 7 hours of sleep per  night.    She does not feel rested in the morning.    Total score - [place]: 18 (9/1/2021 11:30 AM)      EZRA Total Score: 11      ResMed   Auto-PAP 6.0 - 16.0 cmH2O 30 day usage data:    0% of days with > 4 hours of use. 29/30 days with no use.   Average use 4 minutes per day.       Current problems with PAP use include:  1. Moving bedrooms a lot.   2. Too tired to hook up CPAP.   3. No difficulties with using CPAP.    Past medical/surgical history, family history, social history, medications and allergies were reviewed.      Problem List:  Patient Active Problem List    Diagnosis Date Noted     STELLA (obstructive sleep apnea)- severe (AHI 54) 06/18/2021     Priority: Medium     6/17/2021 [place] Diagnostic Sleep Study (255.0 lbs) - AHI 54.6, RDI 73.5, Supine AHI 63.0, REM .5, Low O2 70.5%, Time Spent ?88% 21.0 minutes / Time Spent ?89% 24.7 minutes.Ssevere  hypoventilation was not suggested by oximetry and TCM maximum change from 35-37 to 46 mmHg with 0 minutes at or greater than 55 mmHg.       Essential hypertension 03/19/2021     Priority: Medium     Morbid obesity (H) 03/03/2021     Priority: Medium     Mild intermittent asthma 11/18/2013     Priority: Medium     Chondromalacia of patella 12/10/2012     Priority: Medium     Hx gestational diabetes 04/16/2012     Priority: Medium     IUD (intrauterine device) in place, paragard 11/2010 05/10/2011     Priority: Medium     Placed fall 2010       CARDIOVASCULAR SCREENING; LDL GOAL LESS THAN 160 10/31/2010     Priority: Medium     Acne 10/06/2010     Priority: Medium     Mild major depression (H) 03/31/2010     Priority: Medium     Anxiety 03/31/2010     Priority: Medium        There were no vitals taken for this visit.    Impression/Plan:  Severe obstructive sleep apnea-  Tolerating PAP,  but other behavioral obstacles to use of CPAP exist.  Daytime symptoms are stable.   Patient counseled to get into the habit of using CPAP with all sleep.   Reviewed  obstructive sleep apnea and potential consequences of untreated severe sleep apnea.    Stephanie Mccarthy will follow up in about 3 month(s).     Cyndee Barillas PA-C

## 2021-09-02 NOTE — PROGRESS NOTES
Instructional letter for scheduling 3 month follow up visit has been sent to Renate mon via [employer].     Christine MACK CMA, SLEEP MEDICINE, 9/2/2021 7:36 AM

## 2021-09-18 ENCOUNTER — TELEPHONE (OUTPATIENT)
Dept: ENDOCRINOLOGY | Facility: CLINIC | Age: 46
End: 2021-09-18

## 2021-10-24 ENCOUNTER — HEALTH MAINTENANCE LETTER (OUTPATIENT)
Age: 46
End: 2021-10-24

## 2021-12-13 DIAGNOSIS — E66.01 MORBID OBESITY (H): ICD-10-CM

## 2021-12-13 NOTE — TELEPHONE ENCOUNTER
phentermine (ADIPEX-P) 30 MG capsule  Last Written Prescription Date:  6/28/2021  Last Fill Quantity: 30,   # refills: 5  Last Office Visit :  6/28/2021  Future Office visit:  2/7/2022  Routing refill request to provider for review/approval because:  Drug not on the [facility], P or [facility] refill protocol or controlled substance      Estelita Welch RN  Central Triage Red Flags/Med Refills

## 2021-12-13 NOTE — TELEPHONE ENCOUNTER
Pt has 1 week's supply left of phentermine, and now has (earliest available) 2/7 appt with Dr. Nam. Pharm in orig script is confirmed.    [phone]  **OK to leave detailed VM

## 2021-12-20 RX ORDER — PHENTERMINE HYDROCHLORIDE 30 MG/1
30 CAPSULE ORAL EVERY MORNING
Qty: 30 CAPSULE | Refills: 5 | Status: SHIPPED | OUTPATIENT
Start: 2021-12-20 | End: 2022-06-23

## 2022-02-07 ENCOUNTER — VIRTUAL VISIT (OUTPATIENT)
Dept: ENDOCRINOLOGY | Facility: CLINIC | Age: 47
End: 2022-02-07
Payer: COMMERCIAL

## 2022-02-07 VITALS — HEIGHT: 63 IN | WEIGHT: 242 LBS | BODY MASS INDEX: 42.88 KG/M2

## 2022-02-07 DIAGNOSIS — E66.01 MORBID OBESITY (H): Primary | ICD-10-CM

## 2022-02-07 PROCEDURE — 99213 OFFICE O/P EST LOW 20 MIN: CPT | Mod: 95 | Performed by: INTERNAL MEDICINE

## 2022-02-07 ASSESSMENT — PAIN SCALES - GENERAL: PAINLEVEL: NO PAIN (0)

## 2022-02-07 ASSESSMENT — MIFFLIN-ST. JEOR: SCORE: 1706.83

## 2022-02-07 NOTE — PROGRESS NOTES
"    Return Medical Weight Management Note     Stephanie Mccarthy  MRN:  [id number]  :  1975  YAHAIRA:  22    Dear [facility],    I had the pleasure of seeing your patient Stephanie Mccarthy.  She is a 42 year old female who I am continuing to see for treatment of obesity.    CURRENT WEIGHT:   242 lbs 0 oz    Wt Readings from Last 4 Encounters:   22 109.8 kg (242 lb)   21 115.7 kg (255 lb)   21 114.3 kg (252 lb)   21 115.7 kg (255 lb)     Height:  5' 3\"  Body Mass Index:  Body mass index is 42.87 kg/m .  Vitals:  B/P: 170/109, P: 90    Initial consult weight was 264 lbs on .  Weight change since last visit 21 is down 10 pounds.   Total loss is 22 pounds.    INTERVAL HISTORY:  Feels like medications are helping. Busy with work and personal life.  Struggling with snacks at night.     No flowsheet data found.    MEDICATIONS:   Current Outpatient Medications   Medication     hydrochlorothiazide (HYDRODIURIL) 25 MG tablet     phentermine (ADIPEX-P) 30 MG capsule     topiramate (TOPAMAX) 50 MG tablet     topiramate (TOPAMAX) 25 MG tablet     No current facility-administered medications for this visit.     Weight Loss Medication History Reviewed With Patient 2022   Which weight loss medications are you currently taking on a regular basis?  Phentermine, Topamax (topiramate)   If you are not taking a weight loss medication that was prescribed to you, please indicate why: -   Are you having any side effects from the weight loss medication that we have prescribed you? Yes   If you are having side effects please describe: More tired     Ht 1.6 m (5' 3\")   Wt 109.8 kg (242 lb)   BMI 42.87 kg/m      ASSESSMENT:   Re-focus on food plan with focus on no between meal snacking, aggressive lowering of starches and cheese and maintain phentermine 30 AM and and topiramate 75 AM and suppertime.     FOLLOW-UP:    3 months    Rober Nam MD   "

## 2022-02-07 NOTE — LETTER
"2022       RE: Stephanie Mccarthy  [address] [facility] [address]     Dear Colleague,    Thank you for referring your patient, Stephanie Mccarthy, to the [facility] WEIGHT MANAGEMENT CLINIC [place] at [facility]. Please see a copy of my visit note below.    Renate is a 46 year old who is being evaluated via a billable video visit.      How would you like to obtain your AVS? MyChart  If the video visit is dropped, the invitation should be resent by: [phone]  Will anyone else be joining your video visit? No  During this virtual visit the patient is located in [place], patient verifies this as the location during the entirety of this visit.     Video-Visit Details    Type of service:  Video Visit    Start: 2022 08:12 am  Stop: 2022 08:19 am    Originating Location (pt. Location): Home    Distant Location (provider location):  [facility] WEIGHT MANAGEMENT [facility]     Platform used for Video Visit: HitchedPic          [facility] Medical Weight Management Note     Stephanie Mccarthy  MRN:  [id number]  :  1975  YAHAIRA:  22    Dear [facility],    I had the pleasure of seeing your patient Stephanie Mccarthy.  She is a 42 year old female who I am continuing to see for treatment of obesity.    CURRENT WEIGHT:   242 lbs 0 oz    Wt Readings from Last 4 Encounters:   22 109.8 kg (242 lb)   21 115.7 kg (255 lb)   21 114.3 kg (252 lb)   21 115.7 kg (255 lb)     Height:  5' 3\"  Body Mass Index:  Body mass index is 42.87 kg/m .  Vitals:  B/P: 170/109, P: 90    Initial consult weight was 264 lbs on .  Weight change since last visit 21 is down 10 pounds.   Total loss is 22 pounds.    INTERVAL HISTORY:  Feels like medications are helping. Busy with work and personal life.  Struggling with snacks at night.     No flowsheet data found.    MEDICATIONS:   Current Outpatient Medications " "  Medication     hydrochlorothiazide (HYDRODIURIL) 25 MG tablet     phentermine (ADIPEX-P) 30 MG capsule     topiramate (TOPAMAX) 50 MG tablet     topiramate (TOPAMAX) 25 MG tablet     No current facility-administered medications for this visit.     Weight Loss Medication History Reviewed With Patient 2/7/2022   Which weight loss medications are you currently taking on a regular basis?  Phentermine, Topamax (topiramate)   If you are not taking a weight loss medication that was prescribed to you, please indicate why: -   Are you having any side effects from the weight loss medication that we have prescribed you? Yes   If you are having side effects please describe: More tired     Ht 1.6 m (5' 3\")   Wt 109.8 kg (242 lb)   BMI 42.87 kg/m      ASSESSMENT:   Re-focus on food plan with focus on no between meal snacking, aggressive lowering of starches and cheese and maintain phentermine 30 AM and and topiramate 75 AM and suppertime.     FOLLOW-UP:    3 months    Rober Nam MD       "

## 2022-02-07 NOTE — NURSING NOTE
"(   Chief Complaint   Patient presents with     RECHECK     Return MW    )    ( Weight: 109.8 kg (242 lb) (Patient reported) )  ( Height: 160 cm (5' 3\") )  ( BMI (Calculated): 42.87 )  (   )  (   )  (   )  (   )  (   )  (   )    (   )  (   )  (   )  (   )  (   )  (   )  (   )    (   Patient Active Problem List   Diagnosis     Mild major depression (H)     Anxiety     Acne     CARDIOVASCULAR SCREENING; LDL GOAL LESS THAN 160     IUD (intrauterine device) in place, paragard 11/2010     Hx gestational diabetes     Chondromalacia of patella     Mild intermittent asthma     Morbid obesity (H)     Essential hypertension     STELLA (obstructive sleep apnea)- severe (AHI 54)    )  (   Current Outpatient Medications   Medication Sig Dispense Refill     hydrochlorothiazide (HYDRODIURIL) 25 MG tablet TAKE 1 TABLET(25 MG) BY MOUTH DAILY 30 tablet 1     phentermine (ADIPEX-P) 30 MG capsule Take 1 capsule (30 mg) by mouth every morning 30 capsule 5     topiramate (TOPAMAX) 50 MG tablet Take 1.5 tablets (75 mg) by mouth 2 times daily (Patient taking differently: Take 100 mg by mouth daily ) 360 tablet 11     topiramate (TOPAMAX) 25 MG tablet Take 3 tablets (75 mg) by mouth 2 times daily 180 tablet 11    )  ( Diabetes Eval:    )    ( Pain Eval:  No Pain (0) )    ( Wound Eval:       )    (   History   Smoking Status     Current Some Day Smoker     Packs/day: 0.10     Types: Cigarettes     Last attempt to quit: 10/1/2009   Smokeless Tobacco     Never Used     Comment: Smoking household    )    ( Signed By:  Jo-Ann Osorio, EMT; February 7, 2022; 7:52 AM )    "

## 2022-02-07 NOTE — PROGRESS NOTES
Renate is a 46 year old who is being evaluated via a billable video visit.      How would you like to obtain your AVS? MyChart  If the video visit is dropped, the invitation should be resent by: [phone]  Will anyone else be joining your video visit? No  During this virtual visit the patient is located in [place], patient verifies this as the location during the entirety of this visit.     Video-Visit Details    Type of service:  Video Visit    Start: 02/07/2022 08:12 am  Stop: 02/07/2022 08:19 am    Originating Location (pt. Location): Home    Distant Location (provider location):  [facility] WEIGHT MANAGEMENT CLINIC [place]     Platform used for Video Visit: [employer]ish

## 2022-02-13 ENCOUNTER — HEALTH MAINTENANCE LETTER (OUTPATIENT)
Age: 47
End: 2022-02-13

## 2022-04-10 ENCOUNTER — HEALTH MAINTENANCE LETTER (OUTPATIENT)
Age: 47
End: 2022-04-10

## 2022-06-06 ENCOUNTER — VIRTUAL VISIT (OUTPATIENT)
Dept: ENDOCRINOLOGY | Facility: CLINIC | Age: 47
End: 2022-06-06
Payer: COMMERCIAL

## 2022-06-06 VITALS — WEIGHT: 242 LBS | HEIGHT: 63 IN | BODY MASS INDEX: 42.88 KG/M2

## 2022-06-06 DIAGNOSIS — E66.01 MORBID OBESITY (H): ICD-10-CM

## 2022-06-06 PROCEDURE — 99213 OFFICE O/P EST LOW 20 MIN: CPT | Mod: 95 | Performed by: INTERNAL MEDICINE

## 2022-06-06 ASSESSMENT — PAIN SCALES - GENERAL: PAINLEVEL: NO PAIN (0)

## 2022-06-06 NOTE — LETTER
"2022       RE: Stephanie Mccarthy  [address] [facility] [address]     Dear Colleague,    Thank you for referring your patient, Stephanie Mccarthy, to the [facility] WEIGHT MANAGEMENT CLINIC [place] at [facility]. Please see a copy of my visit note below.    Renate is a 47 year old who is being evaluated via a billable video visit.      How would you like to obtain your AVS? MyChart  If the video visit is dropped, the invitation should be resent by: Send to e-mail at: [email or web address]  Will anyone else be joining your video visit? No    Video-Visit Details    Type of service:  Video Visit    Start: 2022 09:31 am  Stop: 2022 09:39 am    Originating Location (pt. Location): Home    Distant Location (provider location):  [facility] WEIGHT MANAGEMENT [facility]     Platform used for Video Visit: Tap 'n Tap        [facility] Medical Weight Management Note     Stephanie Mccarthy  MRN:  [id number]  :  1975  YAHAIRA:  22    Dear [facility],    I had the pleasure of seeing your patient Stephanie Mccarthy.  She is a 42 year old female who is being seen for treatment of obesity.    CURRENT WEIGHT:   242 lbs 0 oz    Wt Readings from Last 4 Encounters:   22 109.8 kg (242 lb)   22 109.8 kg (242 lb)   21 115.7 kg (255 lb)   21 114.3 kg (252 lb)     Height:  5' 3\"  Body Mass Index:  Body mass index is 42.87 kg/m .  Vitals:  None at this visit    Initial consult weight was 264 lbs on .  Weight change since last visit 22 is unchanged.   Total loss is 22 pounds.    INTERVAL HISTORY:  Feels that weight has increased since her last visit. She attributes this to her increased workload; she states that she eats more at her computer and has been eating more pizza. She states that she weighs herself at least once/month. No changes in the fitting of her clothing. She denies COSTELLO, chest pain, " "palpitations, or insomnia. She notes that her BP is still on the high side, she last checked this at her PCP appointment. So far, she has not been able to exercise.    No flowsheet data found.    MEDICATIONS:   Current Outpatient Medications   Medication     hydrochlorothiazide (HYDRODIURIL) 25 MG tablet     phentermine (ADIPEX-P) 30 MG capsule     topiramate (TOPAMAX) 50 MG tablet     No current facility-administered medications for this visit.     Weight Loss Medication History Reviewed With Patient 6/3/2022   Which weight loss medications are you currently taking on a regular basis?  Phentermine   If you are not taking a weight loss medication that was prescribed to you, please indicate why: -   Are you having any side effects from the weight loss medication that we have prescribed you? No   If you are having side effects please describe: -     Ht 1.6 m (5' 3\")   Wt 109.8 kg (242 lb)   BMI 42.87 kg/m      ASSESSMENT:   Re-focus on food plan with focus on no between meal snacking, aggressive lowering of starches and cheese. Maintain phentermine 30 mg (AM) and increase topiramate to 75 mg (AM) and 100 mg at 2:00 pm.     FOLLOW-UP:    3 months    Clif Menjivar MD     Pt was seen and evaluated with the medical resident. Clinical data was reviewed  and discussed with the patient and with the resident. The note above reflects our  history and findings, and the evaluation and plan reflects my clinical opinion.    Rober Nam MD       "

## 2022-06-06 NOTE — PROGRESS NOTES
Renate is a 47 year old who is being evaluated via a billable video visit.      How would you like to obtain your AVS? MyChart  If the video visit is dropped, the invitation should be resent by: Send to e-mail at: [email or web address]  Will anyone else be joining your video visit? No    Video-Visit Details    Type of service:  Video Visit    Start: 06/06/2022 09:31 am  Stop: 06/06/2022 09:39 am    Originating Location (pt. Location): Home    Distant Location (provider location):  [facility] WEIGHT MANAGEMENT CLINIC [place]     Platform used for Video Visit: [employer]

## 2022-06-06 NOTE — NURSING NOTE
"Chief Complaint   Patient presents with     RECHECK     Ret MWM -- 4 month follow up        Vitals:    06/06/22 0910   Weight: 109.8 kg (242 lb)   Height: 1.6 m (5' 3\")       Body mass index is 42.87 kg/m .          JOI TIPTON EMT    "

## 2022-06-06 NOTE — PROGRESS NOTES
"    Return Medical Weight Management Note     Stephanie Mccarthy  MRN:  [id number]  :  1975  YAHAIRA:  22    Dear [facility],    I had the pleasure of seeing your patient Stephanie Mccarthy.  She is a 42 year old female who is being seen for treatment of obesity.    CURRENT WEIGHT:   242 lbs 0 oz    Wt Readings from Last 4 Encounters:   22 109.8 kg (242 lb)   22 109.8 kg (242 lb)   21 115.7 kg (255 lb)   21 114.3 kg (252 lb)     Height:  5' 3\"  Body Mass Index:  Body mass index is 42.87 kg/m .  Vitals:  None at this visit    Initial consult weight was 264 lbs on .  Weight change since last visit 22 is unchanged.   Total loss is 22 pounds.    INTERVAL HISTORY:  Feels that weight has increased since her last visit. She attributes this to her increased workload; she states that she eats more at her computer and has been eating more pizza. She states that she weighs herself at least once/month. No changes in the fitting of her clothing. She denies COSTELLO, chest pain, palpitations, or insomnia. She notes that her BP is still on the high side, she last checked this at her PCP appointment. So far, she has not been able to exercise.    No flowsheet data found.    MEDICATIONS:   Current Outpatient Medications   Medication     hydrochlorothiazide (HYDRODIURIL) 25 MG tablet     phentermine (ADIPEX-P) 30 MG capsule     topiramate (TOPAMAX) 50 MG tablet     No current facility-administered medications for this visit.     Weight Loss Medication History Reviewed With Patient 6/3/2022   Which weight loss medications are you currently taking on a regular basis?  Phentermine   If you are not taking a weight loss medication that was prescribed to you, please indicate why: -   Are you having any side effects from the weight loss medication that we have prescribed you? No   If you are having side effects please describe: -     Ht 1.6 m (5' 3\")   Wt 109.8 kg (242 lb)   BMI 42.87 kg/m  "     ASSESSMENT:   Re-focus on food plan with focus on no between meal snacking, aggressive lowering of starches and cheese. Maintain phentermine 30 mg (AM) and increase topiramate to 75 mg (AM) and 100 mg at 2:00 pm.     FOLLOW-UP:    3 months    Clif Menjivar MD     Pt was seen and evaluated with the medical resident. Clinical data was reviewed  and discussed with the patient and with the resident. The note above reflects our  history and findings, and the evaluation and plan reflects my clinical opinion.    Rober Nam MD

## 2022-06-18 DIAGNOSIS — E66.01 MORBID OBESITY (H): ICD-10-CM

## 2022-06-20 NOTE — TELEPHONE ENCOUNTER
PHENTERMINE HCL 30MG CAPSULES      Last Written Prescription Date:  12/20/21  Last Fill Quantity: 30,   # refills: 5  Last Office Visit : 6/6/22 recommended 3 month follow up  Future Office visit:  None scheduled    Routing refill request to provider for review/approval because:  Drug controlled substance

## 2022-06-23 DIAGNOSIS — E66.01 MORBID OBESITY (H): ICD-10-CM

## 2022-06-23 NOTE — TELEPHONE ENCOUNTER
phentermine (ADIPEX-P) 30 MG capsule  Last Written Prescription Date:   12/20/2021  Last Fill Quantity: 30,   # refills: 5  Last Office Visit :  6/6/2022  Future Office visit:  None    Routing refill request to provider for review/approval because:  Pt just seen on 6/6/2022.  Needing a follow up visit in 3 months.  Refer to clinic for review and scheduling.  Med is not on protocol to fill.       Estelita Welch RN  Central Triage Red Flags/Med Refills

## 2022-06-23 NOTE — TELEPHONE ENCOUNTER
Reason for Call:  Medication or medication refill: Refill     Do you use a [facility]?  Name of the pharmacy and phone number for the current request:  [id number] - [place] - [address] AT [facility] [address]  Name of the medication requested: phentermine (ADIPEX-P) 30 MG capsule    Other request: Patient is out of her medication  Calling on the status of her medication status.     Can we leave a detailed message on this number? YES    Phone number patient can be reached at: Cell number on file:    Telephone Information:   Mobile [phone]       Best Time: Anytime     Call taken on 6/23/2022 at 2:14 PM by Felisa Gatica

## 2022-06-24 RX ORDER — PHENTERMINE HYDROCHLORIDE 30 MG/1
30 CAPSULE ORAL EVERY MORNING
Qty: 30 CAPSULE | Refills: 5 | Status: SHIPPED | OUTPATIENT
Start: 2022-06-24 | End: 2023-01-24

## 2022-06-24 RX ORDER — PHENTERMINE HYDROCHLORIDE 30 MG/1
30 CAPSULE ORAL EVERY MORNING
Qty: 30 CAPSULE | Refills: 0 | Status: SHIPPED | OUTPATIENT
Start: 2022-06-24 | End: 2022-09-27

## 2022-06-27 ENCOUNTER — TELEPHONE (OUTPATIENT)
Dept: ENDOCRINOLOGY | Facility: CLINIC | Age: 47
End: 2022-06-27

## 2022-06-27 RX ORDER — TOPIRAMATE 50 MG/1
75 TABLET, FILM COATED ORAL 2 TIMES DAILY
Qty: 360 TABLET | Refills: 11 | Status: SHIPPED | OUTPATIENT
Start: 2022-06-27 | End: 2023-01-24

## 2022-06-27 NOTE — TELEPHONE ENCOUNTER
Central Prior Authorization Team   Phone: [phone]      PA Initiation    Medication: Phentermine 30 MG-PA INITIATED  Insurance Company: [employer] - Phone [phone] Fax [phone]  Pharmacy Filling the Rx: [id number] - [place], [place] - [address] AT [facility] [address]  Filling Pharmacy Phone: [phone]  Filling Pharmacy Fax:    Start Date: 6/27/2022

## 2022-06-27 NOTE — TELEPHONE ENCOUNTER
"Prior Authorization Retail Medication Request    Medication/Dose: Phentermine    ICD code (if different than what is on RX):      Previously Tried and Failed: Atkins, Slim Fast, Weight Watchers, a self-imposed calorie restriction and exercise     Rationale: Hyperlipidemia, Sleep Apnea, Knee Pain, GERD, Depression, anxiety, Gestational DM, Pseudotumor cerebri    Weight loss medication(s) are indicated due to patient having a BMI of at least 30 kg/m2     Estimated body mass index is 42.87 kg/m  as calculated from the following:    Height as of 6/6/22: 5' 3\".    Weight as of 6/6/22: 242 lb.    Initial weight: 264 lbs 12.8 oz    Insurance Name:    Insurance ID:        Pharmacy Information (if different than what is on RX)  Name:  [id number] - [place], [place] - [address] AT [facility] [address]  Phone: [phone]      "

## 2022-06-29 NOTE — TELEPHONE ENCOUNTER
Prior Authorization Approval    Authorization Effective Date: 5/28/2022  Authorization Expiration Date: 6/27/2023  Medication: Phentermine 30 MG-PA approved  Approved Dose/Quantity:   Reference #: [id number]   Insurance Company: [employer] - Phone [phone] Fax [phone]  Expected CoPay:       CoPay Card Available:      Foundation Assistance Needed:    Which Pharmacy is filling the prescription (Not needed for infusion/clinic administered): [id number] [place], [place] - [address] [facility] [address]  Pharmacy Notified: Yes  Patient Notified: No

## 2022-08-18 ENCOUNTER — ANCILLARY PROCEDURE (OUTPATIENT)
Dept: MAMMOGRAPHY | Facility: CLINIC | Age: 47
End: 2022-08-18
Attending: FAMILY MEDICINE
Payer: COMMERCIAL

## 2022-08-18 DIAGNOSIS — Z12.31 VISIT FOR SCREENING MAMMOGRAM: ICD-10-CM

## 2022-08-18 PROCEDURE — 77067 SCR MAMMO BI INCL CAD: CPT | Mod: GC

## 2022-09-26 ASSESSMENT — ASTHMA QUESTIONNAIRES
ACT_TOTALSCORE: 25
QUESTION_2 LAST FOUR WEEKS HOW OFTEN HAVE YOU HAD SHORTNESS OF BREATH: NOT AT ALL
ACT_TOTALSCORE: 25
QUESTION_1 LAST FOUR WEEKS HOW MUCH OF THE TIME DID YOUR ASTHMA KEEP YOU FROM GETTING AS MUCH DONE AT WORK, SCHOOL OR AT HOME: NONE OF THE TIME
QUESTION_3 LAST FOUR WEEKS HOW OFTEN DID YOUR ASTHMA SYMPTOMS (WHEEZING, COUGHING, SHORTNESS OF BREATH, CHEST TIGHTNESS OR PAIN) WAKE YOU UP AT NIGHT OR EARLIER THAN USUAL IN THE MORNING: NOT AT ALL
QUESTION_5 LAST FOUR WEEKS HOW WOULD YOU RATE YOUR ASTHMA CONTROL: COMPLETELY CONTROLLED
QUESTION_4 LAST FOUR WEEKS HOW OFTEN HAVE YOU USED YOUR RESCUE INHALER OR NEBULIZER MEDICATION (SUCH AS ALBUTEROL): NOT AT ALL

## 2022-09-27 ENCOUNTER — OFFICE VISIT (OUTPATIENT)
Dept: FAMILY MEDICINE | Facility: CLINIC | Age: 47
End: 2022-09-27
Payer: COMMERCIAL

## 2022-09-27 VITALS
RESPIRATION RATE: 16 BRPM | WEIGHT: 249.6 LBS | SYSTOLIC BLOOD PRESSURE: 160 MMHG | HEART RATE: 72 BPM | BODY MASS INDEX: 44.21 KG/M2 | DIASTOLIC BLOOD PRESSURE: 100 MMHG | OXYGEN SATURATION: 98 % | TEMPERATURE: 97.6 F

## 2022-09-27 DIAGNOSIS — F32.0 MILD MAJOR DEPRESSION (H): ICD-10-CM

## 2022-09-27 DIAGNOSIS — Z12.11 SCREEN FOR COLON CANCER: ICD-10-CM

## 2022-09-27 DIAGNOSIS — I10 HYPERTENSION GOAL BP (BLOOD PRESSURE) < 140/90: Primary | ICD-10-CM

## 2022-09-27 DIAGNOSIS — Z23 NEED FOR VACCINATION: ICD-10-CM

## 2022-09-27 DIAGNOSIS — F17.200 NICOTINE DEPENDENCE, UNCOMPLICATED, UNSPECIFIED NICOTINE PRODUCT TYPE: ICD-10-CM

## 2022-09-27 LAB
ANION GAP SERPL CALCULATED.3IONS-SCNC: 7 MMOL/L (ref 3–14)
BUN SERPL-MCNC: 13 MG/DL (ref 7–30)
CALCIUM SERPL-MCNC: 9.2 MG/DL (ref 8.5–10.1)
CHLORIDE BLD-SCNC: 107 MMOL/L (ref 94–109)
CO2 SERPL-SCNC: 25 MMOL/L (ref 20–32)
CREAT SERPL-MCNC: 0.64 MG/DL (ref 0.52–1.04)
CREAT UR-MCNC: 177 MG/DL
GFR SERPL CREATININE-BSD FRML MDRD: >90 ML/MIN/1.73M2
GLUCOSE BLD-MCNC: 114 MG/DL (ref 70–99)
MICROALBUMIN UR-MCNC: 67 MG/L
MICROALBUMIN/CREAT UR: 37.85 MG/G CR (ref 0–25)
POTASSIUM BLD-SCNC: 3.6 MMOL/L (ref 3.4–5.3)
SODIUM SERPL-SCNC: 139 MMOL/L (ref 133–144)

## 2022-09-27 PROCEDURE — 90677 PCV20 VACCINE IM: CPT | Performed by: FAMILY MEDICINE

## 2022-09-27 PROCEDURE — 99214 OFFICE O/P EST MOD 30 MIN: CPT | Mod: 25 | Performed by: FAMILY MEDICINE

## 2022-09-27 PROCEDURE — 90471 IMMUNIZATION ADMIN: CPT | Performed by: FAMILY MEDICINE

## 2022-09-27 PROCEDURE — 36415 COLL VENOUS BLD VENIPUNCTURE: CPT | Performed by: FAMILY MEDICINE

## 2022-09-27 PROCEDURE — 82043 UR ALBUMIN QUANTITATIVE: CPT | Performed by: FAMILY MEDICINE

## 2022-09-27 PROCEDURE — 80048 BASIC METABOLIC PNL TOTAL CA: CPT | Performed by: FAMILY MEDICINE

## 2022-09-27 PROCEDURE — 96127 BRIEF EMOTIONAL/BEHAV ASSMT: CPT | Performed by: FAMILY MEDICINE

## 2022-09-27 RX ORDER — CHLORTHALIDONE 50 MG/1
50 TABLET ORAL DAILY
Qty: 30 TABLET | Refills: 1 | Status: SHIPPED | OUTPATIENT
Start: 2022-09-27 | End: 2022-09-28

## 2022-09-27 RX ORDER — BUPROPION HYDROCHLORIDE 150 MG/1
150 TABLET, FILM COATED, EXTENDED RELEASE ORAL 2 TIMES DAILY
Qty: 60 TABLET | Refills: 2 | Status: SHIPPED | OUTPATIENT
Start: 2022-10-27 | End: 2023-01-17

## 2022-09-27 RX ORDER — HYDROCHLOROTHIAZIDE 25 MG/1
25 TABLET ORAL DAILY
Qty: 30 TABLET | Refills: 1 | Status: CANCELLED | OUTPATIENT
Start: 2022-09-27

## 2022-09-27 RX ORDER — BUPROPION HYDROCHLORIDE 150 MG/1
TABLET, FILM COATED, EXTENDED RELEASE ORAL
Qty: 57 TABLET | Refills: 0 | Status: SHIPPED | OUTPATIENT
Start: 2022-09-27 | End: 2022-10-27

## 2022-09-27 ASSESSMENT — ANXIETY QUESTIONNAIRES
GAD7 TOTAL SCORE: 10
GAD7 TOTAL SCORE: 10
5. BEING SO RESTLESS THAT IT IS HARD TO SIT STILL: NOT AT ALL
2. NOT BEING ABLE TO STOP OR CONTROL WORRYING: SEVERAL DAYS
3. WORRYING TOO MUCH ABOUT DIFFERENT THINGS: MORE THAN HALF THE DAYS
6. BECOMING EASILY ANNOYED OR IRRITABLE: MORE THAN HALF THE DAYS
7. FEELING AFRAID AS IF SOMETHING AWFUL MIGHT HAPPEN: MORE THAN HALF THE DAYS
1. FEELING NERVOUS, ANXIOUS, OR ON EDGE: SEVERAL DAYS

## 2022-09-27 ASSESSMENT — PATIENT HEALTH QUESTIONNAIRE - PHQ9
SUM OF ALL RESPONSES TO PHQ QUESTIONS 1-9: 9
SUM OF ALL RESPONSES TO PHQ QUESTIONS 1-9: 9
5. POOR APPETITE OR OVEREATING: MORE THAN HALF THE DAYS
10. IF YOU CHECKED OFF ANY PROBLEMS, HOW DIFFICULT HAVE THESE PROBLEMS MADE IT FOR YOU TO DO YOUR WORK, TAKE CARE OF THINGS AT HOME, OR GET ALONG WITH OTHER PEOPLE: SOMEWHAT DIFFICULT

## 2022-09-27 ASSESSMENT — PAIN SCALES - GENERAL: PAINLEVEL: NO PAIN (0)

## 2022-09-27 NOTE — PROGRESS NOTES
"  Assessment & Plan     Hypertension goal BP (blood pressure) < 140/90, uncontrolled.   - Adult Eye  Referral  - Albumin Random Urine Quantitative with Creat Ratio  - Basic metabolic panel  (Ca, Cl, CO2, Creat, Gluc, K, Na, BUN)  - Start: chlorthalidone (HYGROTON) 50 MG tablet  Dispense: 30 tablet; Refill: 1  - Albumin Random Urine Quantitative with Creat Ratio  -Encouraged to continue efforts to eat a well-balanced heart healthy diet and exercise regularly.   -Encouraged to check blood pressure at home and keep a home BP log.  We will recheck hypertension and meds in a month.    Mild major depression (H)  - PHQ-9/ESEQUIEL 7 completed, see below/Epic for details    -Reevaluate in a month after starting bupropion.      Screen for colon cancer  - REVIEW OF HEALTH MAINTENANCE PROTOCOL ORDERS  - Fecal colorectal cancer screen (FIT)    Nicotine dependence, uncomplicated, unspecified nicotine product type  -Patient is motivated to quit smoking.  Set her quit date is October 14.  - [place] Quit Partner Referral  - buPROPion (ZYBAN) 150 MG 12 hr tablet  Dispense: 57 tablet; Refill: 0  - buPROPion (ZYBAN) 150 MG 12 hr tablet  Dispense: 60 tablet; Refill: 2    Need for vaccination  - Pneumococcal 20 Valent Conjugate (Prevnar 20)       Nicotine/Tobacco Cessation:  She reports that she has been smoking cigarettes. She has been smoking about 0.10 packs per day. She has never used smokeless tobacco.  Nicotine/Tobacco Cessation Plan:   Pharmacotherapies : bupropion (Zyban)      BMI:   Estimated body mass index is 44.21 kg/m  as calculated from the following:    Height as of 6/6/22: 1.6 m (5' 3\").    Weight as of this encounter: 113.2 kg (249 lb 9.6 oz).   Weight management plan: Discussed healthy diet and exercise guidelines        Return in about 1 month (around 10/27/2022) for Physical Exam, Medication check.    Cuca Ho MD  [facility] [place] is a 47 year old, presenting for the " following health issues:  Hypertension      History of Present Illness       Hypertension: She presents for follow up of hypertension.  She does not check blood pressure  regularly outside of the clinic. Outside blood pressures have been over 140/90. She does not follow a low salt diet.       Has not taken hydrochlorothiazide since December of last year.   Reporting increased fatigue and lack of motivation with taking this medication.  States that she has been noticing that her blood pressures have been out of control, having headaches and feeling unwell.  Denies having any chest pain, shortness of breath or any dyspnea on exertion.  Blood pressure is significantly elevated above goal today.  BP Readings from Last 3 Encounters:   09/27/22 (!) 160/100   05/05/21 (!) 143/94   03/17/21 (!) 151/97     Admits that she has been under stress over the past year.  Had to relocate her mom and her mom is currently staying with her at home.  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    Has a known history of mild depression and has been having some anxiety as well.  Last PHQ-9 9/27/2022   1.  Little interest or pleasure in doing things 1   2.  Feeling down, depressed, or hopeless 1   3.  Trouble falling or staying asleep, or sleeping too much 1   4.  Feeling tired or having little energy 3   5.  Poor appetite or overeating 1   6.  Feeling bad about yourself 1   7.  Trouble concentrating 1   8.  Moving slowly or restless 0   Q9: Thoughts of better off dead/self-harm past 2 weeks 0   PHQ-9 Total Score 9   Difficulty at work, home, or with people -     ESEQUIEL-7  9/27/2022   1. Feeling nervous, anxious, or on edge 1   2. Not being able to stop or control worrying 1   3. Worrying too much about different things 2   4. Trouble relaxing 2   5. Being so restless that it is hard to sit still 0   6. Becoming easily annoyed or irritable 2   7. Feeling afraid, as if something awful might happen 2   ESEQUIEL-7 Total Score 10   If you checked any  problems, how difficult have they made it for you to do your work, take care of things at home, or get along with other people? -     In the past two weeks have you had thoughts of suicide or self-harm?  No.    Do you have concerns about your personal safety or the safety of others?   No    Patient also admits that she has been smoking more during the stressful period-smoking about 2 packs of cigarettes per week.  Spending more money on cigarettes.  States that she is motivated and feels ready to quit.  Wants to set a quit date.  During the office visit she decided to set a quit date as October 14 th.     HEALTH CARE MAINTENANCE: Due for annual physical and colon cancer screening.  Due for pneumonia vaccine.    Review of Systems   Constitutional, HEENT, cardiovascular, pulmonary, gi and gu systems are negative, except as otherwise noted.      Objective    BP (!) 160/100   Pulse 72   Temp 97.6  F (36.4  C) (Tympanic)   Resp 16   Wt 113.2 kg (249 lb 9.6 oz)   LMP  (LMP Unknown)   SpO2 98%   BMI 44.21 kg/m    Body mass index is 44.21 kg/m .  Physical Exam   GENERAL: healthy, alert and no distress  RESP: lungs clear to auscultation - no rales, rhonchi or wheezes  CV: regular rate and rhythm, normal S1 S2, no S3 or S4, no murmur, click or rub, no peripheral edema and peripheral pulses strong  PSYCH: mentation appears normal, affect normal/bright    DATA  Labs drawn and in process.

## 2022-09-27 NOTE — NURSING NOTE
Prior to immunization administration, verified patients identity using patient s name and date of birth. Please see Immunization Activity for additional information.     Screening Questionnaire for Adult Immunization    Are you sick today?   No   Do you have allergies to medications, food, a vaccine component or latex?   No   Have you ever had a serious reaction after receiving a vaccination?   No   Do you have a long-term health problem with heart, lung, kidney, or metabolic disease (e.g., diabetes), asthma, a blood disorder, no spleen, complement component deficiency, a cochlear implant, or a spinal fluid leak?  Are you on long-term aspirin therapy?   No   Do you have cancer, leukemia, HIV/AIDS, or any other immune system problem?   No   Do you have a parent, brother, or sister with an immune system problem?   No   In the past 3 months, have you taken medications that affect  your immune system, such as prednisone, other steroids, or anticancer drugs; drugs for the treatment of rheumatoid arthritis, Crohn s disease, or psoriasis; or have you had radiation treatments?   No   Have you had a seizure, or a brain or other nervous system problem?   No   During the past year, have you received a transfusion of blood or blood    products, or been given immune (gamma) globulin or antiviral drug?   No   For women: Are you pregnant or is there a chance you could become       pregnant during the next month?   No   Have you received any vaccinations in the past 4 weeks?   No     Immunization questionnaire answers were all negative.        Per orders of Dr. gambino, injection of pna 20 given by Zoya Major MA. Patient instructed to remain in clinic for 15 minutes afterwards, and to report any adverse reaction to me immediately.       Screening performed by Zoya Major MA on 9/27/2022 at 9:20 AM.

## 2022-09-27 NOTE — PATIENT INSTRUCTIONS
Zyban (burpropion) Oral Tablet    Uses  This medicine is used for the following purposes:      depression    stop smoking    Instructions  Swallow the medicine without crushing or chewing it.    This medicine may be taken with or without food.    Try to avoid taking the medicine at bedtime.    Keep the medicine at room temperature. Avoid heat and direct light.    Choose a date to quit smoking. Start this medicine 1 week before your chosen day to quit smoking.    If you forget to take a dose on time, take it as soon as you remember. If it is almost time for the next dose, do not take the missed dose. Return to your normal dosing schedule. Do not take 2 doses of this medicine at one time.    Please tell your doctor and pharmacist about all the medicines you take. Include both prescription and over-the-counter medicines. Also tell them about any vitamins, herbal medicines, or anything else you take for your health.    Do not suddenly stop taking this medicine. Check with your doctor before stopping.    Cautions  Tell your doctor and pharmacist if you ever had an allergic reaction to a medicine. Symptoms of an allergic reaction can include trouble breathing, skin rash, itching, swelling, or severe dizziness.    This medicine is associated with an increased risk for seizures. Please ask your doctor whether you may be at risk for having a seizure while on this medicine.    Do not use the medication any more than instructed.    Your ability to stay alert or to react quickly may be impaired by this medicine. Do not drive or operate machinery until you know how this medicine will affect you.    Please check with your doctor before drinking alcohol while on this medicine.    Family should check on the patient often. Call the doctor if patient becomes more depressed, has thoughts of suicide, or shows changes in behavior.    Call the doctor if there are any signs of confusion or unusual changes in behavior.    Tell the  doctor or pharmacist if you are pregnant, planning to be pregnant, or breastfeeding.    Ask your pharmacist if this medicine can interact with any of your other medicines. Be sure to tell them about all the medicines you take.    Do not start or stop any other medicines without first speaking to your doctor or pharmacist.    Do not share this medicine with anyone who has not been prescribed this medicine.    This medicine is associated with increased risk of death in certain patients. Please speak with your doctor about the benefits and risks of using this medicine.    This medicine can cause serious side effects in some patients. Important information from the U.S. Food and Drug Administration (FDA) is available from your pharmacist. Please review it carefully with your pharmacist to understand the risks associated with this medicine.    Side Effects  The following is a list of some common side effects from this medicine. Please speak with your doctor about what you should do if you experience these or other side effects.      agitated feeling or trouble sleeping    decreased appetite    increased appetite    constipation    dizziness    drowsiness or sedation    dry mouth    headaches    high blood pressure    itching    muscle pain    nausea    skin irritation such as redness, itching, rash, or burning    problems with sexual functions or desire    sore throat    stomach upset or abdominal pain    sweating    vomiting    weight loss    If you have any of the following side effects, you may be getting too much medicine. Please contact your doctor to let them know about these side effects.      change in behavior    confusion    diarrhea    fainting    hallucinations (unusual thoughts, seeing or hearing things that are not real)    rapid heartbeat    pain in the joints    tight or rigid muscles    muscle trembling    Call your doctor or get medical help right away if you notice any of these more serious side  effects:      chest pain    fast or irregular heart beats    dilation of the pupils    seizures    shortness of breath    A few people may have an allergic reaction to this medicine. Symptoms can include difficulty breathing, skin rash, itching, swelling, or severe dizziness. If you notice any of these symptoms, seek medical help quickly.    Extra  Please speak with your doctor, nurse, or pharmacist if you have any questions about this medicine.      [email or web address]    IMPORTANT NOTE: This document tells you briefly how to take your medicine, but it does not tell you all there is to know about it. Your doctor or pharmacist may give you other documents about your medicine. Please talk to them if you have any questions. Always follow their advice. There is a more complete description of this medicine available in English. Scan this code on your smartphone or tablet or use the web address below. You can also ask your pharmacist for a printout. If you have any questions, please ask your pharmacist.   2021 [employer].      2299-8195 [employer]. All rights reserved. This information is not intended as a substitute for professional medical care. Always follow your healthcare professional's instructions.      How to Quit Smoking  Smoking is a hard habit to break. About 50% of all people who have ever smoked have been able to quit. Most people who still smoke want to quit. Here are some of the best ways to stop smoking.     Keep in mind the health benefits of quitting  The health benefits of quitting start right away. They keep improving the longer you go without smoking. Knowing this can help inspire you to stay on track. These benefits occur at any age. If you are 17 or 70, quitting is a good choice. Some of the health benefits after your last cigarette include:     After 20 minutes: Your blood pressure and pulse return to normal.    After 8 hours: Your oxygen levels  return to normal.    After 2 days: Your ability to smell and taste start to improve as damaged nerves regrow.    After 2 to 3 weeks: Your circulation and lung function improve.    After 1 to 9 months: Your coughing, congestion, and shortness of breath decrease. Your tiredness decreases.    After 1 year: Your risk of heart attack decreases by 50%.    After 5 years: Your risk of lung cancer decreases by 50%. Your risk of stroke becomes the same as a nonsmoker s.  Go cold turkey  Most former smokers quit cold turkey. This means stopping all at once. Trying to cut back slowly often doesn't work as well. This may be because it continues the habit of smoking. Also you may inhale more smoke while smoking fewer cigarettes. This leads to the same amount of nicotine in your body.   Get support  Support programs can be a big help, especially for heavy smokers. These groups offer lectures, ways to change behavior, and peer support. Here are some ways to find a support program:     Free national quitline 800-QUIT-NOW (042-003-8361)    [employer] quit-smoking programs    American Lung Association 156-568-4133    American Cancer Society 304-456-7797  Support at home is important too. Family and friends can offer praise and reassurance. If the smoker in your life finds it hard to quit, encourage them to keep trying.   Try over-the-counter medicine  Nicotine replacement therapy may make it easier to quit. Some aids are available without a prescription. These include a nicotine patch, gum, and lozenges. But it's best to use these under the care of your healthcare provider. The skin patch gives a steady supply of nicotine. Nicotine gum and lozenges give short-time doses of low levels of nicotine. Both methods reduce the craving for cigarettes. If you have nausea, vomiting, dizziness, weakness, or a fast heartbeat, stop using these products. See your provider.   Ask about prescription medicine  After reviewing your smoking patterns and  past attempts to quit, your healthcare provider may offer a prescription medicine such as bupropion, varenicline, a nicotine inhaler, or nasal spray. Each has advantages and side effects. Your provider can review these with you.   Keep trying  Most smokers make many attempts at quitting before they succeed. It s important not to give up.   To learn more  For more on how to quit smoking, try these resources:     www.cdc.gov/tobacco/quit_smoking/ 800-QUIT-NOW (113-245-9043)    www.smokefree.gov 877-44U-QUIT (388-402-7880)    www.lung.org/stop-smoking/ 800-LUNGUSA (549-276-3524)  [employer] last reviewed this educational content on 12/1/2019 2000-2021 [employer]. All rights reserved. This information is not intended as a substitute for professional medical care. Always follow your healthcare professional's instructions.

## 2022-10-03 ENCOUNTER — VIRTUAL VISIT (OUTPATIENT)
Dept: ENDOCRINOLOGY | Facility: CLINIC | Age: 47
End: 2022-10-03
Payer: COMMERCIAL

## 2022-10-03 VITALS — WEIGHT: 249 LBS | BODY MASS INDEX: 44.12 KG/M2 | HEIGHT: 63 IN

## 2022-10-03 DIAGNOSIS — E66.01 MORBID OBESITY (H): Primary | ICD-10-CM

## 2022-10-03 PROCEDURE — 99213 OFFICE O/P EST LOW 20 MIN: CPT | Mod: 95 | Performed by: INTERNAL MEDICINE

## 2022-10-03 ASSESSMENT — PAIN SCALES - GENERAL: PAINLEVEL: NO PAIN (0)

## 2022-10-03 NOTE — NURSING NOTE
"Chief Complaint   Patient presents with     PHAM Dewitt, is a participating in a video visit  today for a  4 month follow up, has not taken medication for about one week due to high BP, as reported by patient.       Vitals:    10/03/22 1026   Weight: 112.9 kg (249 lb)   Height: 1.6 m (5' 3\")       Body mass index is 44.11 kg/m .      Dominga Hazel LPN    "

## 2022-10-03 NOTE — LETTER
"10/3/2022       RE: Stephanie Mccarthy  [address] [address] [address]     Dear Colleague,    Thank you for referring your patient, Stephanie Mccarthy, to the [facility] [place] at [facility]. Please see a copy of my visit note below.    Renate is a 47 year old who is being evaluated via a billable video visit.      How would you like to obtain your AVS? MyChart  If the video visit is dropped, the invitation should be resent by: Text to cell phone: [phone]  Will anyone else be joining your video visit? No    Video-Visit Details    Video Start Time: 11:15    Type of service:  Video Visit    Video End Time:11:30    Originating Location (pt. Location): Home    Distant Location (provider location):  [facility]     Platform used for Video Visit: [employer]            [facility]3] Medical Weight Management Note     Stephanie Mccarthy  MRN:  [id number]  :  1975  YAHAIRA:  10/03/22    Dear [facility],    I had the pleasure of seeing your patient Stephanie Mccarthy.  She is a 42 year old female who is being seen for treatment of obesity.    CURRENT WEIGHT:   249 lbs 0 oz    Wt Readings from Last 4 Encounters:   10/03/22 112.9 kg (249 lb)   22 113.2 kg (249 lb 9.6 oz)   22 109.8 kg (242 lb)   22 109.8 kg (242 lb)     Height:  5' 3\"  Body Mass Index:  Body mass index is 44.11 kg/m .  Vitals:  None at this visit    Initial consult weight was 264 lbs on .  Weight change since last visit 22 is up 7.   Total loss is 15 pounds.    INTERVAL HISTORY:   She attributes weight gain to family stress and increased workload. In last week or so has decided with family  To get healthier. Is trying to stop smoking, walk every day and reduced snacking.    No flowsheet data found.    MEDICATIONS:   Current Outpatient Medications   Medication     buPROPion (ZYBAN) 150 MG 12 " "hr tablet     chlorthalidone (HYGROTON) 50 MG tablet     [START ON 10/27/2022] buPROPion (ZYBAN) 150 MG 12 hr tablet     phentermine (ADIPEX-P) 30 MG capsule     topiramate (TOPAMAX) 50 MG tablet     No current facility-administered medications for this visit.     Weight Loss Medication History Reviewed With Patient 6/3/2022   Which weight loss medications are you currently taking on a regular basis?  Phentermine   If you are not taking a weight loss medication that was prescribed to you, please indicate why: -   Are you having any side effects from the weight loss medication that we have prescribed you? No   If you are having side effects please describe: -     Ht 1.6 m (5' 3\")   Wt 112.9 kg (249 lb)   LMP  (LMP Unknown)   BMI 44.11 kg/m      ASSESSMENT:   Re-focus on food plan with focus on no between meal snacking, aggressive lowering of starches and cheese. Maintain phentermine 30 mg (AM) and increase topiramate to 75 mg (AM) and 100 mg at 2:00 pm.     FOLLOW-UP:    3 months    Rober Nam MD       "

## 2022-10-03 NOTE — PROGRESS NOTES
Renate is a 47 year old who is being evaluated via a billable video visit.      How would you like to obtain your AVS? MyChart  If the video visit is dropped, the invitation should be resent by: Text to cell phone: [phone]  Will anyone else be joining your video visit? No    Video-Visit Details    Video Start Time: 11:15    Type of service:  Video Visit    Video End Time:11:30    Originating Location (pt. Location): Home    Distant Location (provider location):  [facility] [place]     Platform used for Video Visit: [employer]

## 2022-10-03 NOTE — PROGRESS NOTES
"    Return Medical Weight Management Note     Stephanie Mccarthy  MRN:  [id number]  :  1975  YAHAIRA:  10/03/22    Dear [facility],    I had the pleasure of seeing your patient Stephanie Mccarthy.  She is a 42 year old female who is being seen for treatment of obesity.    CURRENT WEIGHT:   249 lbs 0 oz    Wt Readings from Last 4 Encounters:   10/03/22 112.9 kg (249 lb)   22 113.2 kg (249 lb 9.6 oz)   22 109.8 kg (242 lb)   22 109.8 kg (242 lb)     Height:  5' 3\"  Body Mass Index:  Body mass index is 44.11 kg/m .  Vitals:  None at this visit    Initial consult weight was 264 lbs on .  Weight change since last visit 22 is up 7.   Total loss is 15 pounds.    INTERVAL HISTORY:   She attributes weight gain to family stress and increased workload. In last week or so has decided with family  To get healthier. Is trying to stop smoking, walk every day and reduced snacking.    No flowsheet data found.    MEDICATIONS:   Current Outpatient Medications   Medication     buPROPion (ZYBAN) 150 MG 12 hr tablet     chlorthalidone (HYGROTON) 50 MG tablet     [START ON 10/27/2022] buPROPion (ZYBAN) 150 MG 12 hr tablet     phentermine (ADIPEX-P) 30 MG capsule     topiramate (TOPAMAX) 50 MG tablet     No current facility-administered medications for this visit.     Weight Loss Medication History Reviewed With Patient 6/3/2022   Which weight loss medications are you currently taking on a regular basis?  Phentermine   If you are not taking a weight loss medication that was prescribed to you, please indicate why: -   Are you having any side effects from the weight loss medication that we have prescribed you? No   If you are having side effects please describe: -     Ht 1.6 m (5' 3\")   Wt 112.9 kg (249 lb)   LMP  (LMP Unknown)   BMI 44.11 kg/m      ASSESSMENT:   Re-focus on food plan with focus on no between meal snacking, aggressive lowering of starches and cheese. Maintain phentermine 30 mg " (AM) and increase topiramate to 75 mg (AM) and 100 mg at 2:00 pm.     FOLLOW-UP:    3 months    Rober Nam MD

## 2022-10-12 ENCOUNTER — OFFICE VISIT (OUTPATIENT)
Dept: OPTOMETRY | Facility: CLINIC | Age: 47
End: 2022-10-12
Attending: FAMILY MEDICINE
Payer: COMMERCIAL

## 2022-10-12 DIAGNOSIS — Z01.00 ROUTINE EYE EXAM: Primary | ICD-10-CM

## 2022-10-12 DIAGNOSIS — H52.4 PRESBYOPIA: ICD-10-CM

## 2022-10-12 DIAGNOSIS — H52.222 REGULAR ASTIGMATISM OF LEFT EYE: ICD-10-CM

## 2022-10-12 DIAGNOSIS — I10 HYPERTENSION GOAL BP (BLOOD PRESSURE) < 140/90: ICD-10-CM

## 2022-10-12 DIAGNOSIS — Z98.890 HX OF LASIK: ICD-10-CM

## 2022-10-12 PROCEDURE — 92015 DETERMINE REFRACTIVE STATE: CPT | Performed by: OPTOMETRIST

## 2022-10-12 PROCEDURE — 92004 COMPRE OPH EXAM NEW PT 1/>: CPT | Performed by: OPTOMETRIST

## 2022-10-12 ASSESSMENT — REFRACTION_MANIFEST
OD_SPHERE: -0.25
OS_CYLINDER: +1.00
OS_SPHERE: -0.25
OD_SPHERE: PLANO
OS_SPHERE: -0.25
OD_CYLINDER: SPHERE
OS_AXIS: 005
OD_ADD: +2.00
OS_CYLINDER: +0.75
OS_AXIS: 005
OS_ADD: +2.00

## 2022-10-12 ASSESSMENT — CUP TO DISC RATIO
OD_RATIO: 0.2
OS_RATIO: 0.2

## 2022-10-12 ASSESSMENT — CONF VISUAL FIELD
OD_INFERIOR_TEMPORAL_RESTRICTION: 0
OS_SUPERIOR_NASAL_RESTRICTION: 0
OS_SUPERIOR_TEMPORAL_RESTRICTION: 0
OD_INFERIOR_NASAL_RESTRICTION: 0
OS_NORMAL: 1
OD_NORMAL: 1
OS_INFERIOR_TEMPORAL_RESTRICTION: 0
OS_INFERIOR_NASAL_RESTRICTION: 0
OD_SUPERIOR_TEMPORAL_RESTRICTION: 0
OD_SUPERIOR_NASAL_RESTRICTION: 0
METHOD: COUNTING FINGERS

## 2022-10-12 ASSESSMENT — VISUAL ACUITY
OS_SC: 20/20
OS_SC+: -2
OD_CC: 20/25
OD_SC: 20/20
METHOD: SNELLEN - LINEAR
CORRECTION_TYPE: GLASSES
OS_CC: 20/50-1

## 2022-10-12 ASSESSMENT — TONOMETRY
IOP_METHOD: APPLANATION
IOP_UNABLETOASSESS: 1
IOP_UNABLETOASSESS: 1
IOP_METHOD: APPLANATION

## 2022-10-12 ASSESSMENT — KERATOMETRY
OD_AXISANGLE2_DEGREES: 155
OD_K1POWER_DIOPTERS: 41.00
OS_K1POWER_DIOPTERS: 41.25
OS_K2POWER_DIOPTERS: 40.75
OD_K2POWER_DIOPTERS: 41.25
OS_AXISANGLE2_DEGREES: 11

## 2022-10-12 ASSESSMENT — REFRACTION_WEARINGRX
SPECS_TYPE: OTC'S
OS_SPHERE: +1.25
OD_SPHERE: +1.25

## 2022-10-12 ASSESSMENT — SLIT LAMP EXAM - LIDS
COMMENTS: NORMAL
COMMENTS: NORMAL

## 2022-10-12 NOTE — PATIENT INSTRUCTIONS
Fill prescription for computer glasses   Return in 1 year for eye exam    Shayla Ruff, OD  [phone]

## 2022-10-12 NOTE — LETTER
10/12/2022         RE: Stephanie Mccarthy  [address] [address]  [place] [address]        Dear Colleague,    Thank you for referring your patient, Stephanie Mccarthy, to the [facility]. No hypertension retinopathy found at eye exam . Please see a copy of my visit note below.    Chief Complaint   Patient presents with     COMPREHENSIVE EYE EXAM         Last Eye Exam: 5/2017  Dilated Previously: Yes    What are you currently using to see?  readers       Distance Vision Acuity: Noticed gradual change in both eyes when she gets tired things get a little blurry     Near Vision Acuity: Satisfied with vision while reading and using phone with readers, not on the computer     Eye Comfort: good  Do you use eye drops? 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       OBJECTIVE: See Ophthalmology exam    ASSESSMENT:    ICD-10-CM    1. Routine eye exam  Z01.00 EYE EXAM (SIMPLE-NONBILLABLE)     REFRACTION      2. Regular astigmatism of left eye  H52.222 EYE EXAM (SIMPLE-NONBILLABLE)     REFRACTION      3. Presbyopia  H52.4 EYE EXAM (SIMPLE-NONBILLABLE)     REFRACTION      4. Hx of LASIK 2016  Z98.890       5. Hypertension goal BP (blood pressure) < 140/90  I10 Adult Eye  Referral     EYE EXAM (SIMPLE-NONBILLABLE)     REFRACTION          PLAN:     Patient Instructions   Fill prescription for computer glasses   Return in 1 year for eye exam    Shayla Ruff, OD  [phone]                      Again, thank you for allowing me to participate in the care of your patient.        Sincerely,        Shayla Ruff, OD

## 2022-10-12 NOTE — PROGRESS NOTES
Chief Complaint   Patient presents with     COMPREHENSIVE EYE EXAM         Last Eye Exam: 5/2017  Dilated Previously: Yes    What are you currently using to see?  readers       Distance Vision Acuity: Noticed gradual change in both eyes when she gets tired things get a little blurry     Near Vision Acuity: Satisfied with vision while reading and using phone with readers, not on the computer     Eye Comfort: good  Do you use eye drops? 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       OBJECTIVE: See Ophthalmology exam    ASSESSMENT:    ICD-10-CM    1. Routine eye exam  Z01.00 EYE EXAM (SIMPLE-NONBILLABLE)     REFRACTION      2. Regular astigmatism of left eye  H52.222 EYE EXAM (SIMPLE-NONBILLABLE)     REFRACTION      3. Presbyopia  H52.4 EYE EXAM (SIMPLE-NONBILLABLE)     REFRACTION      4. Hx of LASIK 2016  Z98.890       5. Hypertension goal BP (blood pressure) < 140/90  I10 Adult Eye  Referral     EYE EXAM (SIMPLE-NONBILLABLE)     REFRACTION          PLAN:     Patient Instructions   Fill prescription for computer glasses   Return in 1 year for eye exam    Shayla Ruff, OD  [phone]

## 2022-10-15 ENCOUNTER — HEALTH MAINTENANCE LETTER (OUTPATIENT)
Age: 47
End: 2022-10-15

## 2022-11-04 ENCOUNTER — OFFICE VISIT (OUTPATIENT)
Dept: FAMILY MEDICINE | Facility: CLINIC | Age: 47
End: 2022-11-04
Payer: COMMERCIAL

## 2022-11-04 ENCOUNTER — TELEPHONE (OUTPATIENT)
Dept: FAMILY MEDICINE | Facility: CLINIC | Age: 47
End: 2022-11-04

## 2022-11-04 VITALS
RESPIRATION RATE: 14 BRPM | HEART RATE: 85 BPM | SYSTOLIC BLOOD PRESSURE: 135 MMHG | BODY MASS INDEX: 43.47 KG/M2 | DIASTOLIC BLOOD PRESSURE: 89 MMHG | OXYGEN SATURATION: 98 % | WEIGHT: 245.4 LBS | TEMPERATURE: 97.2 F

## 2022-11-04 DIAGNOSIS — E66.01 MORBID OBESITY (H): ICD-10-CM

## 2022-11-04 DIAGNOSIS — I10 HYPERTENSION WITH GOAL BLOOD PRESSURE LESS THAN 140/90: ICD-10-CM

## 2022-11-04 DIAGNOSIS — R80.9 MICROALBUMINURIA: ICD-10-CM

## 2022-11-04 DIAGNOSIS — N18.1 CHRONIC KIDNEY DISEASE, STAGE 1: ICD-10-CM

## 2022-11-04 DIAGNOSIS — Z13.1 SCREENING FOR DIABETES MELLITUS: ICD-10-CM

## 2022-11-04 DIAGNOSIS — Z78.9 EX-SMOKER FOR LESS THAN 1 YEAR: ICD-10-CM

## 2022-11-04 DIAGNOSIS — Z13.220 LIPID SCREENING: ICD-10-CM

## 2022-11-04 DIAGNOSIS — Z00.00 ROUTINE GENERAL MEDICAL EXAMINATION AT A HEALTH CARE FACILITY: Primary | ICD-10-CM

## 2022-11-04 DIAGNOSIS — Z23 NEED FOR VACCINATION: ICD-10-CM

## 2022-11-04 DIAGNOSIS — Z12.11 COLON CANCER SCREENING: ICD-10-CM

## 2022-11-04 LAB
CHOLEST SERPL-MCNC: 203 MG/DL
FASTING STATUS PATIENT QL REPORTED: YES
FASTING STATUS PATIENT QL REPORTED: YES
GLUCOSE BLD-MCNC: 116 MG/DL (ref 70–99)
HDLC SERPL-MCNC: 58 MG/DL
LDLC SERPL CALC-MCNC: 115 MG/DL
NONHDLC SERPL-MCNC: 145 MG/DL
TRIGL SERPL-MCNC: 151 MG/DL

## 2022-11-04 PROCEDURE — 0124A COVID-19,PF,PFIZER BOOSTER BIVALENT: CPT | Performed by: FAMILY MEDICINE

## 2022-11-04 PROCEDURE — 90471 IMMUNIZATION ADMIN: CPT | Performed by: FAMILY MEDICINE

## 2022-11-04 PROCEDURE — 80061 LIPID PANEL: CPT | Performed by: FAMILY MEDICINE

## 2022-11-04 PROCEDURE — 99214 OFFICE O/P EST MOD 30 MIN: CPT | Mod: 25 | Performed by: FAMILY MEDICINE

## 2022-11-04 PROCEDURE — 91312 COVID-19,PF,PFIZER BOOSTER BIVALENT: CPT | Performed by: FAMILY MEDICINE

## 2022-11-04 PROCEDURE — 90686 IIV4 VACC NO PRSV 0.5 ML IM: CPT | Performed by: FAMILY MEDICINE

## 2022-11-04 PROCEDURE — 99396 PREV VISIT EST AGE 40-64: CPT | Mod: 25 | Performed by: FAMILY MEDICINE

## 2022-11-04 PROCEDURE — 36415 COLL VENOUS BLD VENIPUNCTURE: CPT | Performed by: FAMILY MEDICINE

## 2022-11-04 PROCEDURE — 82947 ASSAY GLUCOSE BLOOD QUANT: CPT | Performed by: FAMILY MEDICINE

## 2022-11-04 RX ORDER — LOSARTAN POTASSIUM 25 MG/1
25 TABLET ORAL DAILY
Qty: 30 TABLET | Refills: 1 | Status: SHIPPED | OUTPATIENT
Start: 2022-11-04 | End: 2022-11-07

## 2022-11-04 ASSESSMENT — ENCOUNTER SYMPTOMS
HEADACHES: 0
PARESTHESIAS: 0
MYALGIAS: 0
HEARTBURN: 0
ABDOMINAL PAIN: 0
PALPITATIONS: 0
DIARRHEA: 0
NERVOUS/ANXIOUS: 0
DYSURIA: 0
JOINT SWELLING: 0
SHORTNESS OF BREATH: 0
HEMATURIA: 0
BREAST MASS: 0
CONSTIPATION: 0
FREQUENCY: 0
FEVER: 0
COUGH: 1
NAUSEA: 0
SORE THROAT: 1
CHILLS: 0
EYE PAIN: 0
HEMATOCHEZIA: 0
DIZZINESS: 0
ARTHRALGIAS: 0
WEAKNESS: 0

## 2022-11-04 ASSESSMENT — PAIN SCALES - GENERAL: PAINLEVEL: NO PAIN (0)

## 2022-11-04 NOTE — PROGRESS NOTES
SUBJECTIVE:   CC: Renate is an 47 year old who presents for preventive health visit.       Patient has been advised of split billing requirements and indicates understanding: Yes  Healthy Habits:     Getting at least 3 servings of Calcium per day:  NO    Bi-annual eye exam:  Yes    Dental care twice a year:  Yes    Sleep apnea or symptoms of sleep apnea:  Sleep apnea    Diet:  Regular (no restrictions)    Frequency of exercise:  None    Taking medications regularly:  Yes    Medication side effects:  None    PHQ-2 Total Score: 1    Additional concerns today:  Yes    Patient was late for this appointment but was still seen for this visit.    HYPERTENSION - Patient has longstanding history of HTN , currently denies any symptoms referable to elevated blood pressure. Specifically denies chest pain, palpitations, dyspnea, orthopnea, PND or peripheral edema. Blood pressure readings have been in normal range. Current medication regimen is as listed below. Patient denies any side effects of medication- currently on Chlorthalidone.     Recent labs revealed microalbuminuria.     Component      Latest Ref Rng & Units 9/27/2022   Albumin Urine mg/g Cr      0.00 - 25.00 mg/g Cr 37.85 (H)     Patient recently quit smoking. Quit date: 10/17/022- currently on Wellbutrin.  Tolerating well no side effects reported.    HEALTH CARE MAINTENANCE: Reports that her fit test was compromised would like another one today.  Due for labs.Would like to update covid and flu vaccine today     Today's PHQ-2 Score:   PHQ-2 ( 1999 Pfizer) 11/4/2022   Q1: Little interest or pleasure in doing things 1   Q2: Feeling down, depressed or hopeless 0   PHQ-2 Score 1   PHQ-2 Total Score (12-17 Years)- Positive if 3 or more points; Administer PHQ-A if positive -   Q1: Little interest or pleasure in doing things Several days   Q2: Feeling down, depressed or hopeless Not at all   PHQ-2 Score 1       Abuse: Current or Past (Physical, Sexual or Emotional) -  No  Do you feel safe in your environment? Yes        Social History     Tobacco Use     Smoking status: Some Days     Packs/day: 0.10     Types: Cigarettes     Last attempt to quit: 10/1/2009     Years since quittin.1     Smokeless tobacco: Never     Tobacco comments:     Smoking household   Substance Use Topics     Alcohol use: Yes     Comment: Occasional     If you drink alcohol do you typically have >3 drinks per day or >7 drinks per week? No    Alcohol Use 2022   Prescreen: >3 drinks/day or >7 drinks/week? Yes   Prescreen: >3 drinks/day or >7 drinks/week? -   AUDIT SCORE  4     AUDIT - Alcohol Use Disorders Identification Test - Reproduced from the World Health Organization Audit 2001 (Second Edition) 2022   1.  How often do you have a drink containing alcohol? Monthly or less   2.  How many drinks containing alcohol do you have on a typical day when you are drinking? 3 or 4   3.  How often do you have five or more drinks on one occasion? Less than monthly   4.  How often during the last year have you found that you were not able to stop drinking once you had started? Never   5.  How often during the last year have you failed to do what was normally expected of you because of drinking? Never   6.  How often during the last year have you needed a first drink in the morning to get yourself going after a heavy drinking session? Never   7.  How often during the last year have you had a feeling of guilt or remorse after drinking? Less than monthly   8.  How often during the last year have you been unable to remember what happened the night before because of your drinking? Never   9.  Have you or someone else been injured because of your drinking? No   10. Has a relative, friend, doctor or other health care worker been concerned about your drinking or suggested you cut down? No   TOTAL SCORE 4       Reviewed orders with patient.  Reviewed health maintenance and updated orders accordingly - Yes  Lab work  is in process  Labs reviewed in EPIC  BP Readings from Last 3 Encounters:   22 135/89   22 (!) 160/100   21 (!) 143/94    Wt Readings from Last 3 Encounters:   22 111.3 kg (245 lb 6.4 oz)   10/03/22 112.9 kg (249 lb)   22 113.2 kg (249 lb 9.6 oz)                  Patient Active Problem List   Diagnosis     Mild major depression (H)     Anxiety     Acne     CARDIOVASCULAR SCREENING; LDL GOAL LESS THAN 160     IUD (intrauterine device) in place, paragard 2010     Hx gestational diabetes     Chondromalacia of patella     Mild intermittent asthma     Morbid obesity (H)     Essential hypertension     STELLA (obstructive sleep apnea)- severe (AHI 54)     Chronic kidney disease, stage 1     Past Surgical History:   Procedure Laterality Date     DENTAL SURGERY  2006     LASIK  2006       Social History     Tobacco Use     Smoking status: Some Days     Packs/day: 0.10     Types: Cigarettes     Last attempt to quit: 10/1/2009     Years since quittin.1     Smokeless tobacco: Never     Tobacco comments:     Smoking household   Substance Use Topics     Alcohol use: Yes     Comment: Occasional     Family History   Problem Relation Age of Onset     Eye Surgery Mother         cataracts     Obesity Mother      Hypertension Mother      Coronary Artery Disease Father         nonfatal MI @ 52     Cancer Maternal Grandmother         lung     Heart Disease Maternal Grandmother      Diabetes Maternal Grandmother      Breast Cancer Maternal Grandfather         mets     Cancer Paternal Grandmother      Alcohol/Drug Brother      Cerebrovascular Disease Brother      Alcohol/Drug Brother      Thyroid Disease No family hx of      Glaucoma No family hx of      Macular Degeneration No family hx of          Current Outpatient Medications   Medication Sig Dispense Refill     buPROPion (ZYBAN) 150 MG 12 hr tablet Take 1 tablet (150 mg) by mouth 2 times daily After your quit date treatment generally  continues 7-12 weeks. Take your afternoon dose no later than 4pm 60 tablet 2     losartan (COZAAR) 25 MG tablet Take 1 tablet (25 mg) by mouth daily 30 tablet 1     phentermine (ADIPEX-P) 30 MG capsule Take 1 capsule (30 mg) by mouth every morning 30 capsule 5     topiramate (TOPAMAX) 50 MG tablet Take 1.5 tablets (75 mg) by mouth 2 times daily 360 tablet 11     Allergies   Allergen Reactions     Doxycycline      Pseudotumor cerebri     Tetracycline Other (See Comments)     Pseudotumor cerebri.       Perfume Difficulty breathing       Breast Cancer Screening:  Any new diagnosis of family breast, ovarian, or bowel cancer? No    FHS-7:   Breast CA Risk Assessment (FHS-7) 8/18/2022 11/4/2022   Did any of your first-degree relatives have breast or ovarian cancer? No No   Did any of your relatives have bilateral breast cancer? No No   Did any man in your family have breast cancer? Yes Yes   Did any woman in your family have breast and ovarian cancer? No No   Did any woman in your family have breast cancer before age 50 y? No No   Do you have 2 or more relatives with breast and/or ovarian cancer? No No   Do you have 2 or more relatives with breast and/or bowel cancer? No No         Pertinent mammograms are reviewed under the imaging tab.    History of abnormal Pap smear: NO - age 30-65 PAP every 5 years with negative HPV co-testing recommended  PAP / HPV Latest Ref Rng & Units 3/3/2021 2/24/2016 4/16/2012   PAP (Historical) - NIL NIL NIL   HPV16 NEG:Negative Negative - -   HPV18 NEG:Negative Negative - -   HRHPV NEG:Negative Negative - -     Reviewed and updated as needed this visit by clinical staff   Tobacco  Allergies  Meds              Reviewed and updated as needed this visit by Provider                     Review of Systems   Constitutional: Negative for chills and fever.   HENT: Positive for congestion and sore throat. Negative for ear pain and hearing loss.    Eyes: Negative for pain and visual disturbance.    Respiratory: Positive for cough. Negative for shortness of breath.    Cardiovascular: Negative for chest pain, palpitations and peripheral edema.   Gastrointestinal: Negative for abdominal pain, constipation, diarrhea, heartburn, hematochezia and nausea.   Breasts:  Negative for tenderness, breast mass and discharge.   Genitourinary: Negative for dysuria, frequency, genital sores, hematuria, pelvic pain, urgency, vaginal bleeding and vaginal discharge.   Musculoskeletal: Negative for arthralgias, joint swelling and myalgias.   Skin: Negative for rash.   Neurological: Negative for dizziness, weakness, headaches and paresthesias.   Psychiatric/Behavioral: Negative for mood changes. The patient is not nervous/anxious.           OBJECTIVE:   /89   Pulse 85   Temp 97.2  F (36.2  C) (Tympanic)   Resp 14   Wt 111.3 kg (245 lb 6.4 oz)   LMP  (LMP Unknown)   SpO2 98%   BMI 43.47 kg/m    Physical Exam  GENERAL: healthy, alert and no distress  EYES: Eyes grossly normal to inspection, PERRL and conjunctivae and sclerae normal  HENT: ear canals and TM's normal, nose and mouth without ulcers or lesions  NECK: no adenopathy, no asymmetry, masses, or scars and thyroid normal to palpation  RESP: lungs clear to auscultation - no rales, rhonchi or wheezes  BREAST: normal without masses, tenderness or nipple discharge and no palpable axillary masses or adenopathy  CV: regular rate and rhythm, normal S1 S2, no S3 or S4, no murmur, click or rub, no peripheral edema and peripheral pulses strong  ABDOMEN: soft, nontender, no hepatosplenomegaly, no masses and bowel sounds normal  MS: no gross musculoskeletal defects noted, no edema  SKIN: no suspicious lesions or rashes  NEURO: Normal strength and tone, mentation intact and speech normal  PSYCH: mentation appears normal, affect normal/bright    Diagnostic Test Results:  Previous Labs reviewed in Epic    ASSESSMENT/PLAN:   Stephanie was seen today for physical.    Diagnoses and  "all orders for this visit:    Routine general medical examination at a health care facility    Lipid screening  -     Lipid panel reflex to direct LDL Fasting    Screening for diabetes mellitus  -     Glucose    Colon cancer screening  -     Fecal colorectal cancer screen (FIT)    Hypertension with goal blood pressure less than 140/90, controlled.  -We will discontinue chlorthalidone and start an ACE inhibitor or ARB due to microalbuminuria.  Patient is agreeable.     - Start: losartan (COZAAR) 25 MG tablet; Take 1 tablet (25 mg) by mouth daily  -Continue home BP monitoring.    Microalbuminuria  -     Start an ACE inhibitor or ARB: losartan (COZAAR) 25 MG tablet; Take 1 tablet (25 mg) by mouth daily    Chronic kidney disease, stage 1  -     Start: losartan (COZAAR) 25 MG tablet; Take 1 tablet (25 mg) by mouth daily    Ex-smoker for less than 1 year        -   Patient congratulated on quitting smoking.  Quit date was 10/17/2022. Currently on Wellbutrin.    Morbid obesity (H)       -  On oral medication management with a weight loss clinic.        -. Weight management plan: Discussed healthy diet and exercise guidelines      Need for vaccination  -     INFLUENZA VACCINE IM > 6 MONTHS VALENT IIV4 (AFLURIA/FLUZONE)  -     COVID-19,PF,PFIZER BOOSTER BIVALENT (12+YRS)        Patient has been advised of split billing requirements and indicates understanding: Yes      COUNSELING:  Reviewed preventive health counseling, as reflected in patient instructions       Regular exercise       Healthy diet/nutrition    Estimated body mass index is 43.47 kg/m  as calculated from the following:    Height as of 10/3/22: 1.6 m (5' 3\").    Weight as of this encounter: 111.3 kg (245 lb 6.4 oz).    Weight management plan: Discussed healthy diet and exercise guidelines    She reports that she has been smoking cigarettes. She has been smoking an average of .1 packs per day. She has never used smokeless tobacco.  Nicotine/Tobacco Cessation " Plan:   Pharmacotherapies : bupropion (Zyban)- continue      Counseling Resources:  ATP IV Guidelines  Pooled Cohorts Equation Calculator  Breast Cancer Risk Calculator  BRCA-Related Cancer Risk Assessment: FHS-7 Tool  FRAX Risk Assessment  ICSI Preventive Guidelines  Dietary Guidelines for Americans, 2010  USDA's MyPlate  ASA Prophylaxis  Lung CA Screening    Follow up in 1 months- med check.   Next Annual Physical due in 11 /2023    Cuca Ho MD  [facility]

## 2022-11-07 RX ORDER — LOSARTAN POTASSIUM 25 MG/1
25 TABLET ORAL DAILY
Qty: 90 TABLET | Refills: 0 | Status: SHIPPED | OUTPATIENT
Start: 2022-11-07 | End: 2023-01-17

## 2022-12-16 ENCOUNTER — OFFICE VISIT (OUTPATIENT)
Dept: FAMILY MEDICINE | Facility: CLINIC | Age: 47
End: 2022-12-16
Payer: COMMERCIAL

## 2022-12-16 VITALS
OXYGEN SATURATION: 99 % | TEMPERATURE: 97.2 F | SYSTOLIC BLOOD PRESSURE: 128 MMHG | HEART RATE: 104 BPM | BODY MASS INDEX: 43.94 KG/M2 | DIASTOLIC BLOOD PRESSURE: 88 MMHG | WEIGHT: 248 LBS | HEIGHT: 63 IN

## 2022-12-16 DIAGNOSIS — R20.0 NUMBNESS AND TINGLING IN BOTH HANDS: Primary | ICD-10-CM

## 2022-12-16 DIAGNOSIS — R20.2 NUMBNESS AND TINGLING IN BOTH HANDS: Primary | ICD-10-CM

## 2022-12-16 LAB
CHOLEST SERPL-MCNC: 220 MG/DL
FASTING STATUS PATIENT QL REPORTED: YES
HDLC SERPL-MCNC: 54 MG/DL
IRON SERPL-MCNC: 66 UG/DL (ref 35–180)
LDLC SERPL CALC-MCNC: 125 MG/DL
NONHDLC SERPL-MCNC: 166 MG/DL
TRIGL SERPL-MCNC: 203 MG/DL
TSH SERPL DL<=0.005 MIU/L-ACNC: 2.22 MU/L (ref 0.4–4)
VIT B12 SERPL-MCNC: 228 PG/ML (ref 232–1245)

## 2022-12-16 PROCEDURE — 84443 ASSAY THYROID STIM HORMONE: CPT | Performed by: STUDENT IN AN ORGANIZED HEALTH CARE EDUCATION/TRAINING PROGRAM

## 2022-12-16 PROCEDURE — 80061 LIPID PANEL: CPT | Performed by: STUDENT IN AN ORGANIZED HEALTH CARE EDUCATION/TRAINING PROGRAM

## 2022-12-16 PROCEDURE — 83540 ASSAY OF IRON: CPT | Performed by: STUDENT IN AN ORGANIZED HEALTH CARE EDUCATION/TRAINING PROGRAM

## 2022-12-16 PROCEDURE — 82607 VITAMIN B-12: CPT | Performed by: STUDENT IN AN ORGANIZED HEALTH CARE EDUCATION/TRAINING PROGRAM

## 2022-12-16 PROCEDURE — 36415 COLL VENOUS BLD VENIPUNCTURE: CPT | Performed by: STUDENT IN AN ORGANIZED HEALTH CARE EDUCATION/TRAINING PROGRAM

## 2022-12-16 PROCEDURE — 99214 OFFICE O/P EST MOD 30 MIN: CPT | Performed by: STUDENT IN AN ORGANIZED HEALTH CARE EDUCATION/TRAINING PROGRAM

## 2022-12-16 ASSESSMENT — PATIENT HEALTH QUESTIONNAIRE - PHQ9
SUM OF ALL RESPONSES TO PHQ QUESTIONS 1-9: 2
SUM OF ALL RESPONSES TO PHQ QUESTIONS 1-9: 2
10. IF YOU CHECKED OFF ANY PROBLEMS, HOW DIFFICULT HAVE THESE PROBLEMS MADE IT FOR YOU TO DO YOUR WORK, TAKE CARE OF THINGS AT HOME, OR GET ALONG WITH OTHER PEOPLE: NOT DIFFICULT AT ALL

## 2022-12-16 NOTE — PROGRESS NOTES
Assessment & Plan     1. Numbness and tingling in both hands    - EMG; Future  - Vitamin B12; Future  - TSH with free T4 reflex; Future  - Iron; Future  - Iron  - TSH with free T4 reflex  - Vitamin B12  Differential diagnosis including, carpal tunnel syndrome discussed with patient.  Recommend thumb spica splint and activity modification. I will wait for the EMG result.    Return in about 3 weeks (around 1/6/2023) for Follow up, in person wrist pain.    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  She exercises with enough effort to increase her heart rate 3 or less days per week.   She is taking medications regularly.    Today's PHQ-9         PHQ-9 Total Score: 2    PHQ-9 Q9 Thoughts of better off dead/self-harm past 2 weeks :   Not at all    How difficult have these problems made it for you to do your work, take care of things at home, or get along with other people: Not difficult at all       Symptom has been going on for 2 years.  Patient works as a  and she types a lot    Review of Systems   Constitutional, HEENT, cardiovascular, pulmonary, gi and gu systems are negative, except  "as otherwise noted.      Objective    /88 (BP Location: Left arm, Patient Position: Chair, Cuff Size: Adult Large)   Pulse 104   Temp 97.2  F (36.2  C) (Temporal)   Ht 1.6 m (5' 3\")   Wt 112.5 kg (248 lb)   SpO2 99%   BMI 43.93 kg/m    Body mass index is 43.93 kg/m .  Physical Exam   GENERAL: healthy, alert and no distress  RESP:  no audible wheezes  CV: Equal chest rise  MS: Positive Tinel sign , no muscle wasting, no gross musculoskeletal defects noted, no edema          "

## 2022-12-16 NOTE — PATIENT INSTRUCTIONS
Dwight Dewitt,    Thank you for allowing [facility] to manage your care.    I ordered some blood work, please go to the laboratory to get your laboratory studies.      I made a referral, they will be calling in approximately 1 week to set up your appointment.  If you do not hear from them, please call the specialty number on your after visit.     For your convenience, test results are released as soon as they are available  Please allow 1-2 business days for me to send you a comment about your results.  If not done so, I encourage you to login into [employer] ([email or web address] to review your results in real time.     If you have any questions or concerns, please feel free to call us at [phone].    Sincerely,    Dr. Ramires    Did you know?      You can schedule a video visit for follow-up appointments as well as future appointments for certain conditions.  Please see the below link.     [email or web address]    If you have not already done so,  I encourage you to sign up for [employer] ([email or web address]).  This will allow you to review your results, securely communicate with a provider, and schedule virtual visits as well.

## 2022-12-19 DIAGNOSIS — E53.8 VITAMIN B12 DEFICIENCY (NON ANEMIC): Primary | ICD-10-CM

## 2023-01-16 DIAGNOSIS — E66.01 MORBID OBESITY (H): ICD-10-CM

## 2023-01-17 ENCOUNTER — OFFICE VISIT (OUTPATIENT)
Dept: FAMILY MEDICINE | Facility: CLINIC | Age: 48
End: 2023-01-17
Payer: COMMERCIAL

## 2023-01-17 VITALS
BODY MASS INDEX: 44.82 KG/M2 | HEART RATE: 92 BPM | SYSTOLIC BLOOD PRESSURE: 124 MMHG | TEMPERATURE: 97 F | DIASTOLIC BLOOD PRESSURE: 86 MMHG | WEIGHT: 253 LBS | OXYGEN SATURATION: 98 % | RESPIRATION RATE: 18 BRPM

## 2023-01-17 DIAGNOSIS — R20.2 NUMBNESS AND TINGLING IN BOTH HANDS: ICD-10-CM

## 2023-01-17 DIAGNOSIS — R80.9 MICROALBUMINURIA: ICD-10-CM

## 2023-01-17 DIAGNOSIS — F17.200 NICOTINE DEPENDENCE, UNCOMPLICATED, UNSPECIFIED NICOTINE PRODUCT TYPE: ICD-10-CM

## 2023-01-17 DIAGNOSIS — F32.0 MILD MAJOR DEPRESSION (H): ICD-10-CM

## 2023-01-17 DIAGNOSIS — R20.0 NUMBNESS AND TINGLING IN BOTH HANDS: ICD-10-CM

## 2023-01-17 DIAGNOSIS — I10 HYPERTENSION WITH GOAL BLOOD PRESSURE LESS THAN 140/90: Primary | ICD-10-CM

## 2023-01-17 DIAGNOSIS — E53.8 VITAMIN B12 DEFICIENCY (NON ANEMIC): ICD-10-CM

## 2023-01-17 DIAGNOSIS — E66.01 MORBID OBESITY (H): ICD-10-CM

## 2023-01-17 DIAGNOSIS — N18.1 CHRONIC KIDNEY DISEASE, STAGE 1: ICD-10-CM

## 2023-01-17 DIAGNOSIS — G47.33 OSA (OBSTRUCTIVE SLEEP APNEA): Primary | Chronic | ICD-10-CM

## 2023-01-17 PROCEDURE — 99214 OFFICE O/P EST MOD 30 MIN: CPT | Performed by: FAMILY MEDICINE

## 2023-01-17 PROCEDURE — 96127 BRIEF EMOTIONAL/BEHAV ASSMT: CPT | Mod: 59 | Performed by: FAMILY MEDICINE

## 2023-01-17 RX ORDER — BUPROPION HYDROCHLORIDE 150 MG/1
150 TABLET, FILM COATED, EXTENDED RELEASE ORAL 2 TIMES DAILY
Qty: 60 TABLET | Refills: 2 | Status: SHIPPED | OUTPATIENT
Start: 2023-01-17 | End: 2023-12-12

## 2023-01-17 RX ORDER — LOSARTAN POTASSIUM 25 MG/1
25 TABLET ORAL DAILY
Qty: 90 TABLET | Refills: 3 | Status: SHIPPED | OUTPATIENT
Start: 2023-01-17 | End: 2023-12-12

## 2023-01-17 ASSESSMENT — PATIENT HEALTH QUESTIONNAIRE - PHQ9
10. IF YOU CHECKED OFF ANY PROBLEMS, HOW DIFFICULT HAVE THESE PROBLEMS MADE IT FOR YOU TO DO YOUR WORK, TAKE CARE OF THINGS AT HOME, OR GET ALONG WITH OTHER PEOPLE: NOT DIFFICULT AT ALL
SUM OF ALL RESPONSES TO PHQ QUESTIONS 1-9: 2
SUM OF ALL RESPONSES TO PHQ QUESTIONS 1-9: 2

## 2023-01-17 ASSESSMENT — ANXIETY QUESTIONNAIRES
6. BECOMING EASILY ANNOYED OR IRRITABLE: SEVERAL DAYS
3. WORRYING TOO MUCH ABOUT DIFFERENT THINGS: NOT AT ALL
7. FEELING AFRAID AS IF SOMETHING AWFUL MIGHT HAPPEN: NOT AT ALL
4. TROUBLE RELAXING: NOT AT ALL
1. FEELING NERVOUS, ANXIOUS, OR ON EDGE: NOT AT ALL
GAD7 TOTAL SCORE: 1
GAD7 TOTAL SCORE: 1
7. FEELING AFRAID AS IF SOMETHING AWFUL MIGHT HAPPEN: NOT AT ALL
8. IF YOU CHECKED OFF ANY PROBLEMS, HOW DIFFICULT HAVE THESE MADE IT FOR YOU TO DO YOUR WORK, TAKE CARE OF THINGS AT HOME, OR GET ALONG WITH OTHER PEOPLE?: NOT DIFFICULT AT ALL
5. BEING SO RESTLESS THAT IT IS HARD TO SIT STILL: NOT AT ALL
IF YOU CHECKED OFF ANY PROBLEMS ON THIS QUESTIONNAIRE, HOW DIFFICULT HAVE THESE PROBLEMS MADE IT FOR YOU TO DO YOUR WORK, TAKE CARE OF THINGS AT HOME, OR GET ALONG WITH OTHER PEOPLE: NOT DIFFICULT AT ALL
GAD7 TOTAL SCORE: 1
2. NOT BEING ABLE TO STOP OR CONTROL WORRYING: NOT AT ALL

## 2023-01-17 ASSESSMENT — PAIN SCALES - GENERAL: PAINLEVEL: NO PAIN (0)

## 2023-01-17 NOTE — PROGRESS NOTES
Assessment & Plan     1. Hypertension with goal blood pressure less than 140/90, controlled.  - Refill: losartan (COZAAR) 25 MG tablet; Take 1 tablet (25 mg) by mouth daily  Dispense: 90 tablet; Refill: 3    2. Microalbuminuria  -   Refill: losartan (COZAAR) 25 MG tablet; Take 1 tablet (25 mg) by mouth daily  Dispense: 90 tablet; Refill: 3    3. Chronic kidney disease, stage 1  -   Refill:  losartan (COZAAR) 25 MG tablet; Take 1 tablet (25 mg) by mouth daily  Dispense: 90 tablet; Refill: 3    4. Vitamin B12 deficiency (non anemic)      -  On Vitamin B12 supplementation    5.  Numbness and tingling in both hands       -  Scheduled for an EMG on 2/27/23    6. Nicotine dependence, uncomplicated, unspecified nicotine product type      - Refill: BuPROPion (ZYBAN) 150 MG 12 hr tablet; Take 1 tablet (150 mg) by mouth 2 times daily After your quit date treatment generally continues 7-12 weeks. Take your afternoon dose no later than 4pm  Dispense: 60 tablet; Refill: 2    7. Mild major depression (H)      -   PHQ-9/ESEQUIEL 7 completed, see below/Epic for details        -   Much improved with taking Bupropion for smoking cessation.       - Consider continuing it after smoking cessation but decreasing the dose to once daily.       8. Morbid obesity (H)      -  Weight management plan: Discussed healthy diet and exercise guidelines            Return in about 6 months (around 7/17/2023) for Medication check.    Cuca Ho MD  [facility] Dewitt is a 47 year old, presenting for the following health issues:  Hypertension      History of Present Illness       Hypertension: She presents for follow up of hypertension.  She does not check blood pressure  regularly outside of the clinic. Outpatient blood pressures have not been over 140/90. She does not follow a low salt diet.      Today's PHQ-9         PHQ-9 Total Score: 2    PHQ-9 Q9 Thoughts of better off dead/self-harm past 2 weeks :   Not at  all    How difficult have these problems made it for you to do your work, take care of things at home, or get along with other people: Not difficult at all  Today's ESEQUIEL-7 Score: 1     HYPERTENSION - Patient has longstanding history of HTN , currently denies any symptoms referable to elevated blood pressure. Specifically denies chest pain, palpitations, dyspnea, orthopnea, PND or peripheral edema. Blood pressure readings have been in normal range. Current medication regimen is as listed below. Patient denies any side effects of medication.     Does have microalbuminuria with chronic kidney disease stage I - currently on Losartan.   Component      Latest Ref Rng & Units 9/27/2022   Albumin Urine mg/g Cr      0.00 - 25.00 mg/g Cr 37.85 (H)     BP remains well controlled.   BP Readings from Last 3 Encounters:   01/17/23 124/86   12/16/22 128/88   11/04/22 135/89       Still in the process of quitting smoking. Still social smoking sometimes.   Mood has improved. Less anxious/depressed.     Last PHQ-9 1/17/2023   1.  Little interest or pleasure in doing things 0   2.  Feeling down, depressed, or hopeless 0   3.  Trouble falling or staying asleep, or sleeping too much 0   4.  Feeling tired or having little energy 1   5.  Poor appetite or overeating 1   6.  Feeling bad about yourself 0   7.  Trouble concentrating 0   8.  Moving slowly or restless 0   Q9: Thoughts of better off dead/self-harm past 2 weeks 0   PHQ-9 Total Score 2   Difficulty at work, home, or with people -     ESEQUIEL-7  1/17/2023   1. Feeling nervous, anxious, or on edge 0   2. Not being able to stop or control worrying 0   3. Worrying too much about different things 0   4. Trouble relaxing 0   5. Being so restless that it is hard to sit still 0   6. Becoming easily annoyed or irritable 1   7. Feeling afraid, as if something awful might happen 0   ESEQUIEL-7 Total Score 1   If you checked any problems, how difficult have they made it for you to do your work, take  care of things at home, or get along with other people? Not difficult at all     In the past two weeks have you had thoughts of suicide or self-harm?  No.    Do you have concerns about your personal safety or the safety of others?   No    Recently seen for numbness and tingling in both hands- found to have Vitamin B12 deficiency- currently on supplementation.   Due for to have an EMG next month to rule out Carpal Tunnel syndrome.     States that she feels well overall.       Review of Systems   Constitutional, HEENT, cardiovascular, pulmonary, gi and gu systems are negative, except as otherwise noted.      Objective    /86   Pulse 92   Temp 97  F (36.1  C) (Tympanic)   Resp 18   Wt 114.8 kg (253 lb)   SpO2 98%   BMI 44.82 kg/m    Body mass index is 44.82 kg/m .  Physical Exam   GENERAL: healthy, alert and no distress  PSYCH: mentation appears normal, affect normal/bright    DATA  Recent labs reviewed.

## 2023-01-18 NOTE — TELEPHONE ENCOUNTER
phentermine (ADIPEX-P) 30 MG capsule  Last Written Prescription Date:   6/24/2022  Last Fill Quantity: 30,   # refills: 5  Last Office Visit :  10/3/2022  Future Office visit:   3/6/2023    Routing refill request to provider for review/approval because:  Pt has visit in March 2023.  Drug not on the [facility], P or  Health refill protocol or controlled substance      Estelita Welch RN  Central Triage Red Flags/Med Refills

## 2023-01-23 DIAGNOSIS — E66.01 MORBID OBESITY (H): ICD-10-CM

## 2023-01-23 NOTE — TELEPHONE ENCOUNTER
General Call    Contacts       Type Contact Phone/Fax    01/23/2023 01:51 PM CST Phone (Incoming) Renate Mccarthy WONG (Self) [phone] (M)        Reason for Call:  Checking on refills    What are your questions or concerns:  Please call if trouble refilling the topiramate and phentermine (seems to be in process, but she's out as of today)       Could we send this information to you in [employer] or would you prefer to receive a phone call?:   Patient would prefer a phone call   Okay to leave a detailed message?: Yes at Cell number on file:    Telephone Information:   Mobile [phone]

## 2023-01-24 RX ORDER — PHENTERMINE HYDROCHLORIDE 30 MG/1
30 CAPSULE ORAL EVERY MORNING
Qty: 30 CAPSULE | Refills: 1 | Status: SHIPPED | OUTPATIENT
Start: 2023-01-24 | End: 2023-03-06

## 2023-01-24 RX ORDER — PHENTERMINE HYDROCHLORIDE 30 MG/1
30 CAPSULE ORAL EVERY MORNING
Qty: 30 CAPSULE | Refills: 5 | Status: SHIPPED | OUTPATIENT
Start: 2023-01-24 | End: 2023-03-06 | Stop reason: DRUGHIGH

## 2023-01-24 RX ORDER — TOPIRAMATE 50 MG/1
TABLET, FILM COATED ORAL
Qty: 105 TABLET | Refills: 1 | Status: SHIPPED | OUTPATIENT
Start: 2023-01-24 | End: 2023-03-06

## 2023-01-24 RX ORDER — PHENTERMINE HYDROCHLORIDE 30 MG/1
30 CAPSULE ORAL EVERY MORNING
Qty: 30 CAPSULE | OUTPATIENT
Start: 2023-01-24

## 2023-01-24 NOTE — TELEPHONE ENCOUNTER
Last visit with Dr. Nam was in October 2022. She has future visit scheduled in March 2023. Plan from visit is shown below. Prescription orders were not updated at last visit. Routed to available provider for sign off.     ASSESSMENT:   Re-focus on food plan with focus on no between meal snacking, aggressive lowering of starches and cheese. Maintain phentermine 30 mg (AM) and increase topiramate to 75 mg (AM) and 100 mg at 2:00 pm.      FOLLOW-UP:    3 months

## 2023-01-24 NOTE — TELEPHONE ENCOUNTER
topiramate (TOPAMAX) 50 MG tablet   Last Written Prescription Date:  6/27/22  Last Fill Quantity: 360,   # refills: 11  Last Office Visit : 10/3/22  Future Office visit:  3/6/22    phentermine (ADIPEX-P) 30 MG capsule   Last Written Prescription Date:  6/24/22  Last Fill Quantity: 30,   # refills: 5      Routing refill request to provider for review/approval because: request per pt call  phentermine (ADIPEX-P) 30 MG capsule  Controlled  Should have topiramate rfs on file at pharm.

## 2023-02-27 ENCOUNTER — OFFICE VISIT (OUTPATIENT)
Dept: NEUROLOGY | Facility: CLINIC | Age: 48
End: 2023-02-27
Payer: COMMERCIAL

## 2023-02-27 DIAGNOSIS — R20.0 NUMBNESS AND TINGLING IN BOTH HANDS: ICD-10-CM

## 2023-02-27 DIAGNOSIS — R20.2 NUMBNESS AND TINGLING IN BOTH HANDS: ICD-10-CM

## 2023-02-27 PROCEDURE — 95911 NRV CNDJ TEST 9-10 STUDIES: CPT | Performed by: INTERNAL MEDICINE

## 2023-02-27 PROCEDURE — 95886 MUSC TEST DONE W/N TEST COMP: CPT | Performed by: INTERNAL MEDICINE

## 2023-02-27 NOTE — LETTER
2023       RE: Stephanie Mccarthy  [address] [facility] [address]     Dear Colleague,    Thank you for referring your patient, Stephanie Mccarthy, to the [facility] EMG CLINIC [place] at [facility]. Please see a copy of my visit note below.                        [facility]  Electrodiagnostic Laboratory                 Department of Neurology                                                                                                         Test Date:  2023    Patient: Renate Mccarthy : 1975 Physician: Rito Marr MD   Sex: Female AGE: 48 year Ref Phys:    ID#: [id number]   Technician: Kristy Behling     Clinical Information:  Patient is a 47 y/o female who presents for evaluation of bilateral upper extremity numbness/tingling. EMG ordered to evaluate for the presence of focal neuropathy.     Techniques:  Motor and sensory conduction studies were done with surface recording electrodes. EMG was done with a concentric needle electrode.     Results:  Evaluation of the left Median (APB) motor and the right Median (APB) motor nerves showed prolonged distal onset latency (L5.9, R6.6 ms).  The left median sensory nerve showed reduced amplitude (8  V), reduced amplitude (6  V), and decreased conduction velocity (28 m/s).  The right median sensory nerve showed no response.  The left Median-Ulnar Palmar sensory nerve showed abnormal peak latency difference ((Median Palm)-(Ulnar Palm), 1.1 ms).  The right Median-Ulnar Palmar sensory nerve showed no response (Median Palm). The bilateral median-lumbrical/ulnar-interosseous comparison study showed abnormal distal latency difference bilaterally.  All remaining nerves (as indicated in the following tables) were within normal limits.      All examined muscles (as indicated in the following table) showed no evidence of electrical instability.        Interpretation:  This is an  abnormal examination. There is electrophysiologic evidence of moderately severe bilateral median mononeuropathies at the wrist (carpal tunnel syndrome).     ___________________________  Rito Marr MD        Nerve Conduction Studies  Motor Sites      Latency Amplitude Neg. Amp Diff Segment Distance Velocity Neg. Dur Neg Area Diff Temperature Comment   Site (ms) Norm (mV) Norm %  cm m/s Norm ms %  C    Left Median (APB) Motor   Wrist 5.9  < 4.4 5.7  > 5.0  Wrist-APB 8   5.1  33.2    Elbow 9.4 - 5.3  > 5.0 -7.0 Elbow-Wrist 18 51  > 48 4.4 -21.7 33.3    Right Median (APB) Motor   Wrist 6.6  < 4.4 5.1  > 5.0  Wrist-APB 8   6.0  32.3    Elbow 9.6 - 6.0  > 5.0 17.6 Elbow-Wrist 18 60  > 48 6.3 28.3 32.3    Left Median/Ulnar (Lumb-Dors Int II) Motor        Median (Lumb I)   Wrist 4.8 - 0.38 -  Wrist-Lumb I 10   6.7  30.1         Ulnar (Dorsal Int (manus))   Wrist 2.5 - 3.7 -  Wrist-Dorsal Int (manus) 10   4.6  30.1    Right Median/Ulnar (Lumb-Dors Int II) Motor        Median (Lumb I)   Wrist 5.1 - 0.72 -  Wrist-Lumb I 10   5.4  32         Ulnar (Dorsal Int (manus))   Wrist 2.6 - 5.2 -  Wrist-Dorsal Int (manus) 10   4.3  32.1    Left Ulnar (ADM) Motor   Wrist 2.1  < 3.5 8.9  > 5.0  Wrist-ADM 8   5.9  33.3    Bel Elbow 5.0 - 7.4 - -16.9 Bel Elbow-Wrist 17 59  > 48 6.0 -9.5 33.3    Abv Elbow 6.7 - 7.0 - -5.4 Abv Elbow-Bel Elbow 9 53  > 48 5.8 -8.7 33.3    Right Ulnar (ADM) Motor   Wrist 2.1  < 3.5 10.1  > 5.0  Wrist-ADM 8   5.2  32.3    Bel Elbow 5.3 - 7.4 - -26.7 Bel Elbow-Wrist 19 59  > 48 5.3 -22.0 32.3    Abv Elbow 6.8 - 7.4 - 0 Abv Elbow-Bel Elbow 9 60  > 48 5.1 -6.5 32.3      Sensory Sites      Onset Lat Peak Lat Amp (O-P) Amp (P-P) Segment Distance Velocity Temperature Comment   Site ms ms  V Norm  V  cm m/s Norm  C    Left Median Sensory   Wrist-Dig II 5.0 6.4 8  > 10 6 Wrist-Dig II 14 28  > 48 33.3    Right Median Sensory   Wrist-Dig II NR NR NR  > 10 NR Wrist-Dig II 14 NR  > 48 30.8    Left Median-Ulnar Palmar  Sensory        Median   Palm-Wrist 2.1 2.5 6 - 6 Palm-Wrist 8 38 - 32.6         Ulnar   Palm-Wrist 1.13 1.45 7 - 16 Palm-Wrist 8 71 - 33    Right Median-Ulnar Palmar Sensory        Median   Palm-Wrist NR NR NR - NR Palm-Wrist 8 NR - 32.2         Ulnar   Palm-Wrist 0.90 1.38 37 - 34 Palm-Wrist 8 89 - 32.2    Left Radial Sensory   Forearm-Wrist 1.55 2.1 42  > 15 56 Forearm-Wrist 10 65 - 33.3    Left Ulnar Sensory   Wrist-Dig V 1.75 2.3 53  > 8 91 Wrist-Dig V 10 57  > 48 33.7    Right Ulnar Sensory   Wrist-Dig V 1.50 2.0 54  > 8 91 Wrist-Dig V 10 67  > 48 31.5      Inter-Nerve Comparisons     Nerve 1 Value 1 Nerve 2 Value 2 Parameter Result Normal   Sensory Sites   R Median Palm-Wrist - R Ulnar Palm-Wrist 1.4 ms Peak Lat Diff - <0.40   L Median Palm-Wrist 2.5 ms L Ulnar Palm-Wrist 1.5 ms Peak Lat Diff 1.1 ms <0.40       Electromyography     Side Muscle Ins Act Fibs/PSW Fasc HF Amp Dur Poly Recrt Int Pat   Left Deltoid Nml None Nml 0 Nml Nml 0 Nml Nml   Left Triceps Nml None Nml 0 Nml Nml 0 Nml Nml   Left EDC Nml None Nml 0 Nml Nml 0 Nml Nml   Left FDI Nml None Nml 0 Nml Nml 0 Nml Nml   Left Pronator Teres Nml None Nml 0 Nml Nml 0 Nml Nml   Right Deltoid Nml None Nml 0 Nml Nml 0 Nml Nml   Right Triceps Nml None Nml 0 Nml Nml 0 Nml Nml   Right EDC Nml None Nml 0 Nml Nml 0 Nml Nml   Right FDI Nml None Nml 0 Nml Nml 0 Nml Nml   Right Pronator Teres Nml None Nml 0 Nml Nml 0 Nml Nml         NCS Waveforms:    Motor                    Sensory                                 Sincerely,    Rito Marr MD

## 2023-02-27 NOTE — PROGRESS NOTES
[facility]  Electrodiagnostic Laboratory                 Department of Neurology                                                                                                         Test Date:  2023    Patient: Renate Mccarthy : 1975 Physician: Rito Marr MD   Sex: Female AGE: 48 year Ref Phys:    ID#: [id number]   Technician: Kristy Behling     Clinical Information:  Patient is a 49 y/o female who presents for evaluation of bilateral upper extremity numbness/tingling. EMG ordered to evaluate for the presence of focal neuropathy.     Techniques:  Motor and sensory conduction studies were done with surface recording electrodes. EMG was done with a concentric needle electrode.     Results:  Evaluation of the left Median (APB) motor and the right Median (APB) motor nerves showed prolonged distal onset latency (L5.9, R6.6 ms).  The left median sensory nerve showed reduced amplitude (8  V), reduced amplitude (6  V), and decreased conduction velocity (28 m/s).  The right median sensory nerve showed no response.  The left Median-Ulnar Palmar sensory nerve showed abnormal peak latency difference ((Median Palm)-(Ulnar Palm), 1.1 ms).  The right Median-Ulnar Palmar sensory nerve showed no response (Median Palm). The bilateral median-lumbrical/ulnar-interosseous comparison study showed abnormal distal latency difference bilaterally.  All remaining nerves (as indicated in the following tables) were within normal limits.      All examined muscles (as indicated in the following table) showed no evidence of electrical instability.        Interpretation:  This is an abnormal examination. There is electrophysiologic evidence of moderately severe bilateral median mononeuropathies at the wrist (carpal tunnel syndrome).     ___________________________  Rito Marr MD        Nerve Conduction Studies  Motor Sites      Latency Amplitude Neg. Amp Diff Segment Distance Velocity Neg. Dur  Neg Area Diff Temperature Comment   Site (ms) Norm (mV) Norm %  cm m/s Norm ms %  C    Left Median (APB) Motor   Wrist 5.9  < 4.4 5.7  > 5.0  Wrist-APB 8   5.1  33.2    Elbow 9.4 - 5.3  > 5.0 -7.0 Elbow-Wrist 18 51  > 48 4.4 -21.7 33.3    Right Median (APB) Motor   Wrist 6.6  < 4.4 5.1  > 5.0  Wrist-APB 8   6.0  32.3    Elbow 9.6 - 6.0  > 5.0 17.6 Elbow-Wrist 18 60  > 48 6.3 28.3 32.3    Left Median/Ulnar (Lumb-Dors Int II) Motor        Median (Lumb I)   Wrist 4.8 - 0.38 -  Wrist-Lumb I 10   6.7  30.1         Ulnar (Dorsal Int (manus))   Wrist 2.5 - 3.7 -  Wrist-Dorsal Int (manus) 10   4.6  30.1    Right Median/Ulnar (Lumb-Dors Int II) Motor        Median (Lumb I)   Wrist 5.1 - 0.72 -  Wrist-Lumb I 10   5.4  32         Ulnar (Dorsal Int (manus))   Wrist 2.6 - 5.2 -  Wrist-Dorsal Int (manus) 10   4.3  32.1    Left Ulnar (ADM) Motor   Wrist 2.1  < 3.5 8.9  > 5.0  Wrist-ADM 8   5.9  33.3    Bel Elbow 5.0 - 7.4 - -16.9 Bel Elbow-Wrist 17 59  > 48 6.0 -9.5 33.3    Abv Elbow 6.7 - 7.0 - -5.4 Abv Elbow-Bel Elbow 9 53  > 48 5.8 -8.7 33.3    Right Ulnar (ADM) Motor   Wrist 2.1  < 3.5 10.1  > 5.0  Wrist-ADM 8   5.2  32.3    Bel Elbow 5.3 - 7.4 - -26.7 Bel Elbow-Wrist 19 59  > 48 5.3 -22.0 32.3    Abv Elbow 6.8 - 7.4 - 0 Abv Elbow-Bel Elbow 9 60  > 48 5.1 -6.5 32.3      Sensory Sites      Onset Lat Peak Lat Amp (O-P) Amp (P-P) Segment Distance Velocity Temperature Comment   Site ms ms  V Norm  V  cm m/s Norm  C    Left Median Sensory   Wrist-Dig II 5.0 6.4 8  > 10 6 Wrist-Dig II 14 28  > 48 33.3    Right Median Sensory   Wrist-Dig II NR NR NR  > 10 NR Wrist-Dig II 14 NR  > 48 30.8    Left Median-Ulnar Palmar Sensory        Median   Palm-Wrist 2.1 2.5 6 - 6 Palm-Wrist 8 38 - 32.6         Ulnar   Palm-Wrist 1.13 1.45 7 - 16 Palm-Wrist 8 71 - 33    Right Median-Ulnar Palmar Sensory        Median   Palm-Wrist NR NR NR - NR Palm-Wrist 8 NR - 32.2         Ulnar   Palm-Wrist 0.90 1.38 37 - 34 Palm-Wrist 8 89 - 32.2    Left Radial  Sensory   Forearm-Wrist 1.55 2.1 42  > 15 56 Forearm-Wrist 10 65 - 33.3    Left Ulnar Sensory   Wrist-Dig V 1.75 2.3 53  > 8 91 Wrist-Dig V 10 57  > 48 33.7    Right Ulnar Sensory   Wrist-Dig V 1.50 2.0 54  > 8 91 Wrist-Dig V 10 67  > 48 31.5      Inter-Nerve Comparisons     Nerve 1 Value 1 Nerve 2 Value 2 Parameter Result Normal   Sensory Sites   R Median Palm-Wrist - R Ulnar Palm-Wrist 1.4 ms Peak Lat Diff - <0.40   L Median Palm-Wrist 2.5 ms L Ulnar Palm-Wrist 1.5 ms Peak Lat Diff 1.1 ms <0.40       Electromyography     Side Muscle Ins Act Fibs/PSW Fasc HF Amp Dur Poly Recrt Int Pat   Left Deltoid Nml None Nml 0 Nml Nml 0 Nml Nml   Left Triceps Nml None Nml 0 Nml Nml 0 Nml Nml   Left EDC Nml None Nml 0 Nml Nml 0 Nml Nml   Left FDI Nml None Nml 0 Nml Nml 0 Nml Nml   Left Pronator Teres Nml None Nml 0 Nml Nml 0 Nml Nml   Right Deltoid Nml None Nml 0 Nml Nml 0 Nml Nml   Right Triceps Nml None Nml 0 Nml Nml 0 Nml Nml   Right EDC Nml None Nml 0 Nml Nml 0 Nml Nml   Right FDI Nml None Nml 0 Nml Nml 0 Nml Nml   Right Pronator Teres Nml None Nml 0 Nml Nml 0 Nml Nml         NCS Waveforms:    Motor                    Sensory

## 2023-03-06 ENCOUNTER — VIRTUAL VISIT (OUTPATIENT)
Dept: ENDOCRINOLOGY | Facility: CLINIC | Age: 48
End: 2023-03-06
Payer: COMMERCIAL

## 2023-03-06 VITALS — HEIGHT: 63 IN | BODY MASS INDEX: 44.12 KG/M2 | WEIGHT: 249 LBS

## 2023-03-06 DIAGNOSIS — E66.01 MORBID OBESITY (H): ICD-10-CM

## 2023-03-06 PROCEDURE — 99213 OFFICE O/P EST LOW 20 MIN: CPT | Mod: VID | Performed by: INTERNAL MEDICINE

## 2023-03-06 RX ORDER — PHENTERMINE HYDROCHLORIDE 30 MG/1
30 CAPSULE ORAL EVERY MORNING
Qty: 30 CAPSULE | Refills: 5 | Status: SHIPPED | OUTPATIENT
Start: 2023-03-06 | End: 2023-09-13

## 2023-03-06 RX ORDER — TOPIRAMATE 50 MG/1
TABLET, FILM COATED ORAL
Qty: 105 TABLET | Refills: 11 | Status: SHIPPED | OUTPATIENT
Start: 2023-03-06 | End: 2024-02-09

## 2023-03-06 ASSESSMENT — PAIN SCALES - GENERAL: PAINLEVEL: NO PAIN (0)

## 2023-03-06 NOTE — NURSING NOTE
"(   Chief Complaint   Patient presents with     RECHECK     Return MWM    )    ( Weight: 112.9 kg (249 lb) (Pt reported) )  ( Height: 160 cm (5' 3\") )  ( BMI (Calculated): 44.11 )  (   )  (   )  (   )  (   )  (   )  (   )    (   )  (   )  (   )  (   )  (   )  (   )  (   )    (   Patient Active Problem List   Diagnosis     Mild major depression (H)     Anxiety     Acne     CARDIOVASCULAR SCREENING; LDL GOAL LESS THAN 160     IUD (intrauterine device) in place, paragard 11/2010     Hx gestational diabetes     Chondromalacia of patella     Mild intermittent asthma     Morbid obesity (H)     Essential hypertension     STELLA (obstructive sleep apnea)- severe (AHI 54)     Chronic kidney disease, stage 1    )  (   Current Outpatient Medications   Medication Sig Dispense Refill     buPROPion (ZYBAN) 150 MG 12 hr tablet Take 1 tablet (150 mg) by mouth 2 times daily After your quit date treatment generally continues 7-12 weeks. Take your afternoon dose no later than 4pm 60 tablet 2     losartan (COZAAR) 25 MG tablet Take 1 tablet (25 mg) by mouth daily 90 tablet 3     phentermine (ADIPEX-P) 30 MG capsule Take 1 capsule (30 mg) by mouth every morning 30 capsule 1     topiramate (TOPAMAX) 50 MG tablet Take 75mg by mouth in AM and 100mg in the afternoon around 2pm. 105 tablet 1     phentermine (ADIPEX-P) 30 MG capsule Take 1 capsule (30 mg) by mouth every morning 30 capsule 5    )  ( Diabetes Eval:    )    ( Pain Eval:  No Pain (0) )    ( Wound Eval:       )    (   History   Smoking Status     Some Days     Packs/day: 0.10     Types: Cigarettes     Last attempt to quit: 10/1/2009   Smokeless Tobacco     Never    )    ( Signed By:  Cr Eli, EMT; March 6, 2023; 7:32 AM )    "

## 2023-03-06 NOTE — PROGRESS NOTES
"    Return Medical Weight Management Note     Stephanie Mccarthy  MRN:  [id number]  :  1975  YAHAIRA:  23    Dear [facility],    I had the pleasure of seeing your patient Stephanie Mccarthy.  She is a 42 year old female who is being seen for treatment of obesity.    CURRENT WEIGHT:   249 lbs 0 oz    Wt Readings from Last 4 Encounters:   23 112.9 kg (249 lb)   23 114.8 kg (253 lb)   22 112.5 kg (248 lb)   22 111.3 kg (245 lb 6.4 oz)     Height:  5' 3\"  Body Mass Index:  Body mass index is 44.11 kg/m .  Vitals:  None at this visit    Initial consult weight was 264 lbs on .  Weight change since last visit 10/03/22 is down 0.   Total loss is 15 pounds.    INTERVAL HISTORY:  Gave family baking duties to daughter.  Is trying to stop smoking, walk every day and reduce snacking.    No flowsheet data found.    MEDICATIONS:   Current Outpatient Medications   Medication     buPROPion (ZYBAN) 150 MG 12 hr tablet     losartan (COZAAR) 25 MG tablet     phentermine (ADIPEX-P) 30 MG capsule     topiramate (TOPAMAX) 50 MG tablet     phentermine (ADIPEX-P) 30 MG capsule     No current facility-administered medications for this visit.     Weight Loss Medication History Reviewed With Patient 3/3/2023   Which weight loss medications are you currently taking on a regular basis?  Phentermine   If you are not taking a weight loss medication that was prescribed to you, please indicate why: -   Are you having any side effects from the weight loss medication that we have prescribed you? No   If you are having side effects please describe: -     Ht 1.6 m (5' 3\")   Wt 112.9 kg (249 lb)   BMI 44.11 kg/m      ASSESSMENT:   Re-focus on food plan with focus on no between meal snacking, aggressive lowering of starches and cheese. Maintain phentermine 30 mg (AM) and topiramate to 75 mg (AM) and 100 mg at 2:00 pm.     FOLLOW-UP:    3 months    Rober Nam MD   "

## 2023-03-06 NOTE — LETTER
"3/6/2023       RE: Stephanie Mccarthy  [address] [facility] [address]     Dear Colleague,    Thank you for referring your patient, Stephanie Mccarthy, to the [facility] WEIGHT MANAGEMENT CLINIC [place] at [facility]. Please see a copy of my visit note below.    Stephanie Mccarthy is a 48 year old who is being evaluated via a billable video visit.      How would you like to obtain your AVS? MyChart  If the video visit is dropped, the invitation should be resent by: Text to cell phone: [phone]  Will anyone else be joining your video visit? No  If patient encounters technical issues they should call [phone]    During this virtual visit the patient is located in [place], patient verifies this as the location during the entirety of this visit.     Video-Visit Details  Joined the call at 3/6/2023, 8:08:08 am.  Left the call at 3/6/2023, 8:15:03 am.    Originating Location (pt. Location): Home    Distant Location (provider location):  Off-site    Platform used for Video Visit: WILFRED Hodges 3/6/2023      6:56 AM          Return Medical Weight Management Note     Stephanie Mccarthy  MRN:  [id number]  :  1975  YAHAIRA:  23    Dear [facility],    I had the pleasure of seeing your patient Stephanie Mccarthy.  She is a 42 year old female who is being seen for treatment of obesity.    CURRENT WEIGHT:   249 lbs 0 oz    Wt Readings from Last 4 Encounters:   23 112.9 kg (249 lb)   23 114.8 kg (253 lb)   22 112.5 kg (248 lb)   22 111.3 kg (245 lb 6.4 oz)     Height:  5' 3\"  Body Mass Index:  Body mass index is 44.11 kg/m .  Vitals:  None at this visit    Initial consult weight was 264 lbs on .  Weight change since last visit 10/03/22 is down 0.   Total loss is 15 pounds.    INTERVAL HISTORY:  Gave family baking duties to daughter.  Is trying to stop smoking, walk every day and reduce snacking.    No " "flowsheet data found.    MEDICATIONS:   Current Outpatient Medications   Medication     buPROPion (ZYBAN) 150 MG 12 hr tablet     losartan (COZAAR) 25 MG tablet     phentermine (ADIPEX-P) 30 MG capsule     topiramate (TOPAMAX) 50 MG tablet     phentermine (ADIPEX-P) 30 MG capsule     No current facility-administered medications for this visit.     Weight Loss Medication History Reviewed With Patient 3/3/2023   Which weight loss medications are you currently taking on a regular basis?  Phentermine   If you are not taking a weight loss medication that was prescribed to you, please indicate why: -   Are you having any side effects from the weight loss medication that we have prescribed you? No   If you are having side effects please describe: -     Ht 1.6 m (5' 3\")   Wt 112.9 kg (249 lb)   BMI 44.11 kg/m      ASSESSMENT:   Re-focus on food plan with focus on no between meal snacking, aggressive lowering of starches and cheese. Maintain phentermine 30 mg (AM) and topiramate to 75 mg (AM) and 100 mg at 2:00 pm.     FOLLOW-UP:    3 months    Rober Nam MD       "

## 2023-03-06 NOTE — PROGRESS NOTES
Stephanie Mccarthy is a 48 year old who is being evaluated via a billable video visit.      How would you like to obtain your AVS? MyChart  If the video visit is dropped, the invitation should be resent by: Text to cell phone: [phone]  Will anyone else be joining your video visit? No  If patient encounters technical issues they should call [phone]    During this virtual visit the patient is located in [place], patient verifies this as the location during the entirety of this visit.     Video-Visit Details  Joined the call at 3/6/2023, 8:08:08 am.  Left the call at 3/6/2023, 8:15:03 am.    Originating Location (pt. Location): Home    Distant Location (provider location):  Off-site    Platform used for Video Visit: Zachary Eli, WILFRED 3/6/2023      6:56 AM

## 2023-03-07 ENCOUNTER — TELEPHONE (OUTPATIENT)
Dept: ENDOCRINOLOGY | Facility: CLINIC | Age: 48
End: 2023-03-07
Payer: COMMERCIAL

## 2023-03-08 RX ORDER — TOPIRAMATE 50 MG/1
TABLET, FILM COATED ORAL
Qty: 315 TABLET | OUTPATIENT
Start: 2023-03-08

## 2023-06-08 ENCOUNTER — TELEPHONE (OUTPATIENT)
Dept: ENDOCRINOLOGY | Facility: CLINIC | Age: 48
End: 2023-06-08
Payer: COMMERCIAL

## 2023-06-08 NOTE — TELEPHONE ENCOUNTER
JANET and sent mychart regarding following appointment:    Reschedule 7/10/23 appt with Viv due to provider being unavailable    Reschedule to next available with Dr Nam

## 2023-07-19 ENCOUNTER — PATIENT OUTREACH (OUTPATIENT)
Dept: CARE COORDINATION | Facility: CLINIC | Age: 48
End: 2023-07-19
Payer: COMMERCIAL

## 2023-07-24 ENCOUNTER — OFFICE VISIT (OUTPATIENT)
Dept: FAMILY MEDICINE | Facility: CLINIC | Age: 48
End: 2023-07-24
Payer: COMMERCIAL

## 2023-07-24 VITALS
TEMPERATURE: 98.3 F | WEIGHT: 258 LBS | HEIGHT: 63 IN | OXYGEN SATURATION: 98 % | DIASTOLIC BLOOD PRESSURE: 82 MMHG | SYSTOLIC BLOOD PRESSURE: 118 MMHG | HEART RATE: 84 BPM | BODY MASS INDEX: 45.71 KG/M2

## 2023-07-24 DIAGNOSIS — B02.9 HERPES ZOSTER WITHOUT COMPLICATION: Primary | ICD-10-CM

## 2023-07-24 DIAGNOSIS — L01.00 IMPETIGO: ICD-10-CM

## 2023-07-24 PROCEDURE — 99213 OFFICE O/P EST LOW 20 MIN: CPT | Performed by: PHYSICIAN ASSISTANT

## 2023-07-24 RX ORDER — MUPIROCIN 20 MG/G
OINTMENT TOPICAL 3 TIMES DAILY
Qty: 30 G | Refills: 0 | Status: SHIPPED | OUTPATIENT
Start: 2023-07-24 | End: 2023-07-31

## 2023-07-24 RX ORDER — VALACYCLOVIR HYDROCHLORIDE 1 G/1
1000 TABLET, FILM COATED ORAL 3 TIMES DAILY
Qty: 21 TABLET | Refills: 0 | Status: SHIPPED | OUTPATIENT
Start: 2023-07-24 | End: 2023-10-17

## 2023-07-24 ASSESSMENT — PATIENT HEALTH QUESTIONNAIRE - PHQ9
SUM OF ALL RESPONSES TO PHQ QUESTIONS 1-9: 3
10. IF YOU CHECKED OFF ANY PROBLEMS, HOW DIFFICULT HAVE THESE PROBLEMS MADE IT FOR YOU TO DO YOUR WORK, TAKE CARE OF THINGS AT HOME, OR GET ALONG WITH OTHER PEOPLE: NOT DIFFICULT AT ALL
SUM OF ALL RESPONSES TO PHQ QUESTIONS 1-9: 3

## 2023-07-24 NOTE — PROGRESS NOTES
"  Assessment & Plan       ICD-10-CM    1. Herpes zoster without complication  B02.9 valACYclovir (VALTREX) 1000 mg tablet      2. Impetigo  L01.00 mupirocin (BACTROBAN) 2 % external ointment          1) Symptoms and exam consistent with Shingles. Valtrex 1g TID x7 days.     2) Possible early impetigo. Bactroban topical TID x7 days. Wound care discussed. She has the option to work from home this week, so she was provided a letter for this.      Prescription drug management         BMI:   Estimated body mass index is 45.7 kg/m  as calculated from the following:    Height as of this encounter: 1.6 m (5' 3\").    Weight as of this encounter: 117 kg (258 lb).     Return in about 2 weeks (around 8/7/2023), or if symptoms worsen or fail to improve.     CHRISTIANA Akhtar [facility] [place] is a 48 year old, presenting for the following health issues:  Chin Problem        7/24/2023    10:56 AM   Additional Questions   Roomed by Elle PINEDO   Accompanied by          7/24/2023    10:56 AM   Patient Reported Additional Medications   Patient reports taking the following new medications none     History of Present Illness       Reason for visit:  Infection or sore on my chin and lump on chin, started out like what she thought was a pimp[le or zit last Thursday  Symptom onset:  1-3 days ago  Symptoms include:  Lump and swelling  Symptom intensity:  Moderate  Symptom progression:  Worsening  Had these symptoms before:  No  What makes it worse:  Nothing  What makes it better:  Hot compresse and Ibuprofen    She eats 0-1 servings of fruits and vegetables daily.She consumes 0 sweetened beverage(s) daily.She exercises with enough effort to increase her heart rate 10 to 19 minutes per day.  She exercises with enough effort to increase her heart rate 4 days per week.   She is taking medications regularly.       Area burns and tingles a bit  Swelling underneath her chin  Hasn't noticed " "any blisters or weeping       Review of Systems   Constitutional, skin systems are negative, except as otherwise noted.      Objective    /82 (BP Location: Left arm, Patient Position: Sitting, Cuff Size: Adult Large)   Pulse 84   Temp 98.3  F (36.8  C) (Oral)   Ht 1.6 m (5' 3\")   Wt 117 kg (258 lb)   SpO2 98%   BMI 45.70 kg/m    Body mass index is 45.7 kg/m .  Physical Exam   GENERAL: healthy, alert and no distress  NECK: submandibular glands enlarged   MS: no gross musculoskeletal defects noted, no edema  SKIN: erythematous plaque and papules of right chin/jaw, two small open areas  NEURO: Normal strength and tone, mentation intact and speech normal  PSYCH: mentation appears normal, affect normal/bright                "

## 2023-07-24 NOTE — LETTER
[facility] [place]  [address] [address] [place] [address]  Phone: [phone]    July 24, 2023        Stephanie Mccarthy  [address]  [place] [address]          To whom it may concern:    RE: Stephanie Mccarthy    Patient was seen and treated today at our clinic and missed work. Due to medical reasons, I recommend she work from home through 7/28/23.    Please contact me for questions or concerns.      Sincerely,        Gretta Bo PA-C

## 2023-08-16 ENCOUNTER — PATIENT OUTREACH (OUTPATIENT)
Dept: CARE COORDINATION | Facility: CLINIC | Age: 48
End: 2023-08-16
Payer: COMMERCIAL

## 2023-09-13 DIAGNOSIS — E66.01 MORBID OBESITY (H): ICD-10-CM

## 2023-09-13 NOTE — TELEPHONE ENCOUNTER
Medication Question or Refill    Contacts         Type Contact Phone/Fax    09/13/2023 08:42 AM CDT Phone (Incoming) Renate Mccarthy (Self) [phone] (M)            What medication are you calling about (include dose and sig)?: Phentermine    Preferred Pharmacy:[id number] - [place], [place] - [address] AT [facility] [address]  [facility] [place] [address]  Phone: [phone] Fax: [phone]        Controlled Substance Agreement on file:   CSA -- Patient Level:    CSA: None found at the patient level.         Do you need a refill? Yes      Do you have any questions or concerns?  No      Could we send this information to you in [employer] or would you prefer to receive a phone call?:   Patient would prefer a phone call   Okay to leave a detailed message?: Yes at Cell number on file:    Telephone Information:   Mobile [phone]

## 2023-09-13 NOTE — TELEPHONE ENCOUNTER
phentermine (ADIPEX-P) 30 MG capsule      Last Written Prescription Date:  3-6-23  Last Fill Quantity: 30,   # refills: 5  Last Office Visit : 3-6-23  Future Office visit:  10-4-23    Creatinine   Date Value Ref Range Status   09/27/2022 0.64 0.52 - 1.04 mg/dL Final   03/03/2021 0.61 0.52 - 1.04 mg/dL Final      Routing refill request to provider for review/approval because:  Drug not on the [facility] refill protocol or controlled substance  Overdue Cr

## 2023-09-15 RX ORDER — PHENTERMINE HYDROCHLORIDE 30 MG/1
30 CAPSULE ORAL EVERY MORNING
Qty: 30 CAPSULE | Refills: 0 | Status: SHIPPED | OUTPATIENT
Start: 2023-09-15 | End: 2023-10-04

## 2023-10-04 ENCOUNTER — VIRTUAL VISIT (OUTPATIENT)
Dept: ENDOCRINOLOGY | Facility: CLINIC | Age: 48
End: 2023-10-04
Payer: COMMERCIAL

## 2023-10-04 VITALS — WEIGHT: 256 LBS | BODY MASS INDEX: 45.36 KG/M2 | HEIGHT: 63 IN

## 2023-10-04 DIAGNOSIS — E66.01 MORBID OBESITY (H): ICD-10-CM

## 2023-10-04 PROCEDURE — 99203 OFFICE O/P NEW LOW 30 MIN: CPT | Mod: VID

## 2023-10-04 RX ORDER — NALTREXONE HYDROCHLORIDE 50 MG/1
TABLET, FILM COATED ORAL
Qty: 30 TABLET | Refills: 3 | Status: SHIPPED | OUTPATIENT
Start: 2023-10-04 | End: 2024-08-12 | Stop reason: ALTCHOICE

## 2023-10-04 RX ORDER — PHENTERMINE HYDROCHLORIDE 30 MG/1
30 CAPSULE ORAL EVERY MORNING
Qty: 30 CAPSULE | Refills: 3 | Status: SHIPPED | OUTPATIENT
Start: 2023-10-04 | End: 2024-02-08

## 2023-10-04 ASSESSMENT — PAIN SCALES - GENERAL: PAINLEVEL: MODERATE PAIN (4)

## 2023-10-04 NOTE — PROGRESS NOTES
Virtual Visit Details    Type of service:  Video Visit     Originating Location (pt. Location): Home    Distant Location (provider location):  Off-site  Platform used for Video Visit: [facility] Medical Weight Management Note     Stephanie Mccarthy  MRN:  [id number]  :  1975  YAHAIRA:  10/4/2023    Dear Cuca Ho MD,    I had the pleasure of seeing your patient Stephanie Mccarthy. She is a 48 year old female who I am continuing to see for treatment of obesity related to:        Assessment & Plan   Problem List Items Addressed This Visit       Morbid obesity (H) (Chronic)     Followed by Dr. Nam since . Historically took Qsymia, and now taking components.     Currently taking Phentermine and Topiramate. No side effects. Does not feel like it is as helpful as Qsymia. Does not feel like she is getting the same response.     Discussed other AOMs today:   - GLP-1 - no contraindications. Does not want to start for concern for side effects long term.   - Naltrexone - to be started today. No contraindications. Discussed side effects, risks, and benefits. No hx of liver disease. Not taking opioids. Currently on Bupropion.          Relevant Medications    naltrexone (DEPADE/REVIA) 50 MG tablet    phentermine (ADIPEX-P) 30 MG capsule      Continue Phentermine 30mg once daily. Refills sent   Continue Topiramate 75mg twice daily. No refills needed   Start Naltrexone 50mg - take 1/2 tablet daily for 7 days, then increase to 1 tablet daily.   Continue decreasing ginger ale at night   Follow up with Dr. Nam in 3-4 months         INTERVAL HISTORY:  First seen by Dr. Nam    Started Qsymia  Transitioned to components of Phentermine + topiramate       Anti-obesity medications:     Current:   Phentermine 30mg - no side effects. Feel like body is used to this. Has noticed increase fatigue and less energy.     Topiramate 75mg BID - No side effects.     Felt like Qsymia was more helpful.  However, insurance no longer covered it.     Does not feel like is seeing weight loss. Would like to discuss other AOMs today.     Recent diet changes: Eating 2 meals a day, with snacking at times. Drinks 100oz water daily. No increase hunger or thoughts of food. Minimal cravings. Can get full and stay full. Waking up in the middle of the night and will drink ginger ale (having 3 cans a night).     Recent exercise/activity changes: Has been harder with decrease in motivation.     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     Recent sleep changes: No concerns     Vitamins/Labs: Will be getting labs with PCP in the next 2 weeks.     CURRENT WEIGHT:   256 lbs 0 oz    Initial Weight (lbs): 264 lbs  Last Visits Weight: 112.9 kg (249 lb)  Cumulative weight loss (lbs): 8  Weight Loss Percentage: 3.03%    Wt Readings from Last 5 Encounters:   10/04/23 116.1 kg (256 lb)   07/24/23 117 kg (258 lb)   03/06/23 112.9 kg (249 lb)   01/17/23 114.8 kg (253 lb)   12/16/22 112.5 kg (248 lb)             10/4/2023     8:30 AM   Changes and Difficulties   I have made the following changes to my diet since my last visit: Increased fruit   With regards to my diet, I am still struggling with: exercise and energy   I have made the following changes to my activity/exercise since my last visit: it seems to have decreased   With regards to my activity/exercise, I am still struggling with: energy, I am often overly tired after work.         MEDICATIONS:   Current Outpatient Medications   Medication Sig Dispense Refill    buPROPion (ZYBAN) 150 MG 12 hr tablet Take 1 tablet (150 mg) by mouth 2 times daily After your quit date treatment generally continues 7-12 weeks. Take your afternoon dose no later than 4pm 60 tablet 2    losartan (COZAAR) 25 MG tablet Take 1 tablet (25 mg) by mouth daily 90 tablet 3    naltrexone (DEPADE/REVIA) 50 MG tablet Take 1/2 tablet once daily 1-2 hours  "prior to worst cravings for 1 week, then increase to 1 tablet daily as directed if tolerating 30 tablet 3    phentermine (ADIPEX-P) 30 MG capsule Take 1 capsule (30 mg) by mouth every morning 30 capsule 3    topiramate (TOPAMAX) 50 MG tablet Take 75mg by mouth in AM and 100mg in the afternoon around 2pm. 105 tablet 11    valACYclovir (VALTREX) 1000 mg tablet Take 1 tablet (1,000 mg) by mouth 3 times daily for 7 days 21 tablet 0           10/4/2023     8:30 AM   Weight Loss Medication History Reviewed With Patient   Which weight loss medications are you currently taking on a regular basis? Phentermine   Are you having any side effects from the weight loss medication that we have prescribed you? No     Anti-obesity medication ROS:    HEENT  Hx of glaucoma: No    Cardiovascular  CAD:No  HTN:Yes    Gastrointestinal  GERD:Yes, triggered by food  Constipation:No  Liver Dz:No  H/O Pancreatitis:No    Psychiatric  Bipolar: No  Anxiety:Yes  Depression:Yes  History of alcohol/drug abuse: No  Hx of eating disorder:No    Endocrine  Personal or family hx of MTC or MEN2:No  Diabetes/prediabetes: No    Neurologic:  Hx of seizures: No  Hx of migraines: No  Memory Impairment: No      History of kidney stones: No  Kidney disease: No  Current birth control: No, not sexauly active     Taking Opioid/Narcotic: No        Objective    Ht 1.6 m (5' 2.99\")   Wt 116.1 kg (256 lb)   BMI 45.36 kg/m           PHYSICAL EXAM:    GENERAL: Healthy, alert and no distress  EYES: Eyes grossly normal to inspection.  No discharge or erythema, or obvious scleral/conjunctival abnormalities.  RESP: No audible wheeze, cough, or visible cyanosis.  No visible retractions or increased work of breathing.    SKIN: Visible skin clear. No significant rash, abnormal pigmentation or lesions.  NEURO: Cranial nerves grossly intact.  Mentation and speech appropriate for age.  PSYCH: Mentation appears normal, affect normal/bright, judgement and insight intact, normal " speech and appearance well-groomed.        Sincerely,    JOSE BISHOP PA-C      30 minutes spent by me on the date of the encounter doing chart review, history and exam, documentation and further activities per the note

## 2023-10-04 NOTE — LETTER
10/4/2023       RE: Stephnaie Mccarthy  [address] [place] [address]     Dear Colleague,    Thank you for referring your patient, Setphanie Mccarthy, to the [facility]NA[facility] [place] [facility]. Please see a copy of my visit note below.    Virtual Visit Details    Type of service:  Video Visit     Originating Location (pt. Location): Home    Distant Location (provider location):  Off-site  Platform used for Video Visit: [facility] Medical Weight Management Note     Stephanie Mccarthy  MRN:  [id number]  :  1975  YAHAIRA:  10/4/2023    Dear Cuca Ho MD,    I had the pleasure of seeing your patient Stephanie Mccarthy. She is a 48 year old female who I am continuing to see for treatment of obesity related to:        Assessment & Plan  Problem List Items Addressed This Visit       Morbid obesity (H) (Chronic)     Followed by Dr. Nam since . Historically took Qsymia, and now taking components.     Currently taking Phentermine and Topiramate. No side effects. Does not feel like it is as helpful as Qsymia. Does not feel like she is getting the same response.     Discussed other AOMs today:   - GLP-1 - no contraindications. Does not want to start for concern for side effects long term.   - Naltrexone - to be started today. No contraindications. Discussed side effects, risks, and benefits. No hx of liver disease. Not taking opioids. Currently on Bupropion.          Relevant Medications    naltrexone (DEPADE/REVIA) 50 MG tablet    phentermine (ADIPEX-P) 30 MG capsule      Continue Phentermine 30mg once daily. Refills sent   Continue Topiramate 75mg twice daily. No refills needed   Start Naltrexone 50mg - take 1/2 tablet daily for 7 days, then increase to 1 tablet daily.   Continue decreasing ginger ale at night   Follow up with Dr. Nam in 3-4 months         INTERVAL HISTORY:  First seen by   Viv 2014   Started Qsymia  Transitioned to components of Phentermine + topiramate       Anti-obesity medications:     Current:   Phentermine 30mg - no side effects. Feel like body is used to this. Has noticed increase fatigue and less energy.     Topiramate 75mg BID - No side effects.     Felt like Qsymia was more helpful. However, insurance no longer covered it.     Does not feel like is seeing weight loss. Would like to discuss other AOMs today.     Recent diet changes: Eating 2 meals a day, with snacking at times. Drinks 100oz water daily. No increase hunger or thoughts of food. Minimal cravings. Can get full and stay full. Waking up in the middle of the night and will drink ginger ale (having 3 cans a night).     Recent exercise/activity changes: Has been harder with decrease in motivation.     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     Recent sleep changes: No concerns     Vitamins/Labs: Will be getting labs with PCP in the next 2 weeks.     CURRENT WEIGHT:   256 lbs 0 oz    Initial Weight (lbs): 264 lbs  Last Visits Weight: 112.9 kg (249 lb)  Cumulative weight loss (lbs): 8  Weight Loss Percentage: 3.03%    Wt Readings from Last 5 Encounters:   10/04/23 116.1 kg (256 lb)   07/24/23 117 kg (258 lb)   03/06/23 112.9 kg (249 lb)   01/17/23 114.8 kg (253 lb)   12/16/22 112.5 kg (248 lb)             10/4/2023     8:30 AM   Changes and Difficulties   I have made the following changes to my diet since my last visit: Increased fruit   With regards to my diet, I am still struggling with: exercise and energy   I have made the following changes to my activity/exercise since my last visit: it seems to have decreased   With regards to my activity/exercise, I am still struggling with: energy, I am often overly tired after work.         MEDICATIONS:   Current Outpatient Medications   Medication Sig Dispense Refill    buPROPion (ZYBAN) 150 MG 12 hr  "tablet Take 1 tablet (150 mg) by mouth 2 times daily After your quit date treatment generally continues 7-12 weeks. Take your afternoon dose no later than 4pm 60 tablet 2    losartan (COZAAR) 25 MG tablet Take 1 tablet (25 mg) by mouth daily 90 tablet 3    naltrexone (DEPADE/REVIA) 50 MG tablet Take 1/2 tablet once daily 1-2 hours prior to worst cravings for 1 week, then increase to 1 tablet daily as directed if tolerating 30 tablet 3    phentermine (ADIPEX-P) 30 MG capsule Take 1 capsule (30 mg) by mouth every morning 30 capsule 3    topiramate (TOPAMAX) 50 MG tablet Take 75mg by mouth in AM and 100mg in the afternoon around 2pm. 105 tablet 11    valACYclovir (VALTREX) 1000 mg tablet Take 1 tablet (1,000 mg) by mouth 3 times daily for 7 days 21 tablet 0           10/4/2023     8:30 AM   Weight Loss Medication History Reviewed With Patient   Which weight loss medications are you currently taking on a regular basis? Phentermine   Are you having any side effects from the weight loss medication that we have prescribed you? No     Anti-obesity medication ROS:    HEENT  Hx of glaucoma: No    Cardiovascular  CAD:No  HTN:Yes    Gastrointestinal  GERD:Yes, triggered by food  Constipation:No  Liver Dz:No  H/O Pancreatitis:No    Psychiatric  Bipolar: No  Anxiety:Yes  Depression:Yes  History of alcohol/drug abuse: No  Hx of eating disorder:No    Endocrine  Personal or family hx of MTC or MEN2:No  Diabetes/prediabetes: No    Neurologic:  Hx of seizures: No  Hx of migraines: No  Memory Impairment: No      History of kidney stones: No  Kidney disease: No  Current birth control: No, not sexauly active     Taking Opioid/Narcotic: No        Objective   Ht 1.6 m (5' 2.99\")   Wt 116.1 kg (256 lb)   BMI 45.36 kg/m           PHYSICAL EXAM:    GENERAL: Healthy, alert and no distress  EYES: Eyes grossly normal to inspection.  No discharge or erythema, or obvious scleral/conjunctival abnormalities.  RESP: No audible wheeze, cough, or " visible cyanosis.  No visible retractions or increased work of breathing.    SKIN: Visible skin clear. No significant rash, abnormal pigmentation or lesions.  NEURO: Cranial nerves grossly intact.  Mentation and speech appropriate for age.  PSYCH: Mentation appears normal, affect normal/bright, judgement and insight intact, normal speech and appearance well-groomed.        Sincerely,    JOSE BISHOP PA-C      30 minutes spent by me on the date of the encounter doing chart review, history and exam, documentation and further activities per the note

## 2023-10-04 NOTE — NURSING NOTE
Is the patient currently in the state of [place]? YES    Visit mode:VIDEO    If the visit is dropped, the patient can be reconnected by: VIDEO VISIT: Send to e-mail at: [email or web address]    Will anyone else be joining the visit? NO  (If patient encounters technical issues they should call [phone])    How would you like to obtain your AVS? MyChart    Are changes needed to the allergy or medication list? Pt stated no changes to allergies and Pt stated no med changes    Reason for visit: Follow Up    Jenna PIERRE

## 2023-10-04 NOTE — PROGRESS NOTES
"Virtual Visit Details    Type of service:  Video Visit     Originating Location (pt. Location): {video visit patient location:860044::\"Home\"}  {PROVIDER LOCATION On-site should be selected for visits conducted from your clinic location or adjoining [facility] hospital, academic office, or other nearby [facility] building. Off-site should be selected for all other provider locations, including home:037162}  Distant Location (provider location):  {virtual location provider:840957}  Platform used for Video Visit: {Virtual Visit Platforms:920105::\"PacketHop\"}    "

## 2023-10-05 ENCOUNTER — PATIENT OUTREACH (OUTPATIENT)
Dept: CARE COORDINATION | Facility: CLINIC | Age: 48
End: 2023-10-05
Payer: COMMERCIAL

## 2023-10-11 NOTE — ASSESSMENT & PLAN NOTE
Followed by Dr. Nam since 2014. Historically took Qsymia, and now taking components.     Currently taking Phentermine and Topiramate. No side effects. Does not feel like it is as helpful as Qsymia. Does not feel like she is getting the same response.     Discussed other AOMs today:   - GLP-1 - no contraindications. Does not want to start for concern for side effects long term.   - Naltrexone - to be started today. No contraindications. Discussed side effects, risks, and benefits. No hx of liver disease. Not taking opioids. Currently on Bupropion.

## 2023-10-11 NOTE — PATIENT INSTRUCTIONS
"Hi Cheril, it was nice to meet you today!  Thank you for allowing us the privilege of caring for you. We hope we provided you with the excellent service you deserve.   Please let us know if there is anything else we can do for you so that we can be sure you are completely satisfied with your care experience.    To ensure the quality of our services you may be receiving a patient satisfaction survey from an independent patient satisfaction monitoring company.    The greatest compliment you can give is a \"Likely to Recommend\"    Your visit was with JOSE BISHOP PA-C today.    Instructions per today's visit:     Continue Phentermine 30mg once daily. Refills sent   Continue Topiramate 75mg twice daily. No refills needed   Start Naltrexone 50mg - take 1/2 tablet daily for 7 days, then increase to 1 tablet daily.   Continue decreasing ginger ale at night   Follow up with Dr. Nam in 3-4 months     ___________________________________________________________________________  Important contact and scheduling information:  Please call our contact center at [phone] to schedule your next appointments.  For any nursing questions or concerns call Edna Talamantes LPN at [phone] or Summer Pate RN at [phone]  Please call during clinic hours Monday through Friday 8:00a - 4:00p if you have questions or you can contact us via Known at anytime and we will reply during clinic hours.    Lab results will be communicated through My Chart or letter (if My Chart not used). Please call the clinic if you have not received communication after 1 week or if you have any questions.?  Clinic Fax: [phone]  __________________________________________________________________________    If labs were ordered today:    Please make an appointment to have them drawn at your convenience.     To schedule the Lab Appointment using Known:  Select \"Schedule an Appointment\"  Select \"Lab Only\"  For \"A couple of questions\", select " "\"Other\"  For \"Which locations work for you?, select the location and set up the appointment    To schedule by phone call [phone] to schedule a lab only appointment at any [facility] lab.  ___________________________________________________________________________  Work with A Health !  Virtual Sessions are Available through [facility] Weight Management Clinics    To learn more, call to schedule a free, Health  Q&A appointment: [phone]     What is Health Coaching?  Do you know what you are supposed to do, but you just aren't doing it?  Then, HEALTH COACHING may help you!   Get unstuck and move forward with the support of a professionally trained [employer]-HWC (National Board-Certified Health and ) who uses evidence-based approaches to help you move forward with healthy lifestyle changes in the areas of weight loss, stress management and overall well-being.    Health Coaches help you identify goals that will work best for you. Health Coaches provide support and encouragement with overcoming barriers and help you to find inspiration and motivation to lead a healthy lifestyle.    Option one:  Health Coaching 3-Pack; Three, 30-minute Health Coaching Visits, for $99  Visits are done virtually (phone or video)  This is a self pay service; we do not accept insurance for phyllis coaching.    Option two:   The 24 week Plan; 11 Health Coaching Visits, and a 7 months subscription to [employer]-- on-demand fitness, nutrition and mindfulness classes, for $499 (employee discounts may be available). Participants will also meet regularly with a weight management Medical Provider and a Registered/Licensed Dietician.  This is a self-pay service; we do not accept insurance for health coaching.    To Schedule a free Health  Q&A appointment to learn more,  call [phone].  ____________________________________________________________________    [facility]" [facility]   Healthy Lifestyle Virtual Support Group    Healthy Lifestyle Virtual Support Group?  This is 60 minutes of small group guided discussion, support and resources. All are welcome who want a healthy lifestyle.  WHEN: Starting in July 2023, this group meets the 1st Friday of the month from 12:30 PM - 1:30 PM virtually using Microsoft Teams.    FACILITATOR: Led by National Board Certified Health and , Gretta Arias [facility].   TO REGISTER: Please send an email to Gretta [email or web address] to receive monthly invites to the group or if you have any questions about having a health .  Prior to the meeting, a link with directions on how to join the meeting will be sent to you.    2023 Meetings  May 19: Let's Talk  June 9: Create Your Coaching Toolkit: Learn How to  Yourself  July 7: Let's Talk  August 4: Benefits of Fiber with ALONA Rainey  September 1: Show and Tell (share your aps, podcasts, recipes, hacks, books)  October 6 :Let's Talk  November 3: Introduction to Mindfulness   December 1: Let's Talk    If you would like bariatric surgery specific support group info please let your care team know.         Thank you,   [facility] Comprehensive Weight Management Team      NALTREXONE    We are considering starting Naltrexone. Start with 1/2 tab 1-2 hours prior to the time you have the most trouble with cravings or extra hunger. If you are doing well you may switch to a whole tablet taken at the same time period.     WARNING: This medication blocks the action of opioid type pain medications. If you routinely take any medication like Codeine, Oxycontin,Percocet,Morphine,Dilaudid or Methodone, do not take this until you have talked with weight management staff. If you are planning surgery you should stop Naltrexone 4 days prior to the surgery. If you have an injury that requires pain medication, make sure the health care staff knows you take Naltrexone.     Naltrexone is a  "medication that is used most often to help people who are troubled by dependence on prescription pain killers or alcohol. It has also been found to help with weight loss. Although it's not currently FDA approved for weight loss, it has been used safely for a number of years to help people who are carrying extra weight.     Just how Naltrexone helps with weight loss has not been exactly determined.  It seems to work by quieting down brain signals related to strong food cravings. Many of our patients use the word \"addiction\" to describe their feelings and constant thoughts about food. It makes sense then to treat the feeling of dependence on food, outside of real hunger, with a medication designed to help with other sorts of dependence.     Our patients on Naltrexone find that they:    >feel less interest in food   >think less about food and eating and have more time to think of other things   >find it easier to push the plate away   >have an easier time eating less    For some of our patients, these feelings are very immediate. Other patients, don't feel much of a change but find they've lost weight. Like all weight loss medications, Naltrexone works best when you help it work. This means:  1. Having less tempting high calorie (fattening) food around the house or office. (For people with strong cravings this is very important.)   2. Staying away from situations or people that may trigger your cravings .   3. Eating out only one time or less each week.  4. Eating your meals at a table with the TV or computer off.    Side-effects. Naltrexone is generally well tolerated. The main side-effect we see is  nausea or a woozy feeling. A small number of people feel quite ill. Most people have a mild reaction and some people have no reaction at all.  The good news is that this feeling does go away.     In order to avoid nausea, please start the medication with half a pill for the first few days. Go on to a full pill if you are " feeling well.      If you  are nauseated on 1/2 a pill it is okay to cut back to 1/4 pill ( a very small amount). Take this for a couple of days and work your way back up to a 1/2 pill and then a whole pill. Taking the medication at night or with food  to start also may help prevent the feeling of nausea.       For any questions or concerns please send a [employer] message to our team or call our weight management call center at [phone] during regular business hours. For questions during evenings or weekends your messages will be addressed during the next business day.  For emergencies please call 911 or seek immediate medical care.     (Do not stop taking it if you don't think it's working. For some people it works without them knowing it.)      Please refer to the pharmacy insert for more information on side-effects. Since many pharmacists are not familiar with the use of naltrexone in weight loss, calling the nurse at [phone] will get you the most accurate information.  In order to get refills of this or any medication we prescribe you must be seen in the medical weight mgmt clinic every 2-3 months. Please have your pharmacy fax a refill request to [phone].

## 2023-10-17 ENCOUNTER — OFFICE VISIT (OUTPATIENT)
Dept: FAMILY MEDICINE | Facility: CLINIC | Age: 48
End: 2023-10-17
Payer: COMMERCIAL

## 2023-10-17 VITALS
DIASTOLIC BLOOD PRESSURE: 60 MMHG | WEIGHT: 255.6 LBS | OXYGEN SATURATION: 99 % | BODY MASS INDEX: 45.29 KG/M2 | TEMPERATURE: 97.1 F | RESPIRATION RATE: 16 BRPM | HEIGHT: 63 IN | SYSTOLIC BLOOD PRESSURE: 128 MMHG | HEART RATE: 83 BPM

## 2023-10-17 DIAGNOSIS — E66.01 MORBID OBESITY (H): ICD-10-CM

## 2023-10-17 DIAGNOSIS — Z12.11 SCREEN FOR COLON CANCER: ICD-10-CM

## 2023-10-17 DIAGNOSIS — Z12.31 ENCOUNTER FOR SCREENING MAMMOGRAM FOR BREAST CANCER: ICD-10-CM

## 2023-10-17 DIAGNOSIS — G56.03 BILATERAL CARPAL TUNNEL SYNDROME: Primary | ICD-10-CM

## 2023-10-17 PROCEDURE — 99214 OFFICE O/P EST MOD 30 MIN: CPT | Performed by: FAMILY MEDICINE

## 2023-10-17 ASSESSMENT — ASTHMA QUESTIONNAIRES: ACT_TOTALSCORE: 25

## 2023-10-17 ASSESSMENT — PAIN SCALES - GENERAL: PAINLEVEL: NO PAIN (0)

## 2023-10-17 NOTE — PROGRESS NOTES
"  Assessment & Plan     Bilateral carpal tunnel syndrome, worsening   -Nerve conduction studies completed on 2/27/2023-see [facility] for details.  - Orthopedic  Referral    Screen for colon cancer  - Fecal colorectal cancer screen (FIT)    Encounter for screening mammogram for breast cancer  - MA SCREENING DIGITAL BILAT - Future  (s+30)    Morbid obesity (H)  - Weight management plan: Discussed healthy diet and exercise guidelines  -Continue pharmacotherapy per the Weight Loss clinic           Nicotine/Tobacco Cessation:  She reports that she has been smoking cigarettes. She has been smoking an average of .1 packs per day. She has been exposed to tobacco smoke. She has never used smokeless tobacco.  Nicotine/Tobacco Cessation Plan:   Pharmacotherapies : bupropion (Zyban)      BMI:   Estimated body mass index is 45.86 kg/m  as calculated from the following:    Height as of this encounter: 1.59 m (5' 2.6\").    Weight as of this encounter: 115.9 kg (255 lb 9.6 oz).   Weight management plan: Discussed healthy diet and exercise guidelines . Following with the Weight loss clinic.       Return in about 8 weeks (around 12/12/2023).      Cuca Ho MD  [facility] JENN Dewitt is a 48 year old, presenting for the following health issues:  Cts (Recheck - carpal tunnel) and Health Maintenance (Patient declined vaccinations)        10/17/2023     1:07 PM   Additional Questions   Roomed by Trinidad Patel, Student MA and Vijay   Accompanied by N/A         10/17/2023     1:07 PM   Patient Reported Additional Medications   Patient reports taking the following new medications No new medications     Carpal tunnel has been going on for a few years (in both hands), never been seen for it before    History of Present Illness       Reason for visit:  Carpal Tunnel    She eats 0-1 servings of fruits and vegetables daily.She consumes 2 sweetened beverage(s) daily.She exercises with enough effort to " "increase her heart rate 9 or less minutes per day.  She exercises with enough effort to increase her heart rate 3 or less days per week.   She is taking medications regularly.       Patient reports ongoing numbness and tingling of bilateral wrists.   Had nerve conduction studies completed on 2/27/2023 by Neurology-this was reported abnormal with electrophysiological evidence of moderately severe bilateral mononeuropathies at the wrist (carpal tunnel syndrome)-see epic for details.    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 Worse with the right wrist than the left wrist.   Reporting work prohibitive bilateral wrist pain despite using wrist brace.    Morbid obesity-  Currently on pharmacotherapy under the care of the weight loss clinic.    Still in the process of quitting smoking.  Still taking bupropion, now also used in her weight loss regimen.       HEALTH CARE MAINTENANCE: Due for screening mammogram, colon cancer screening and annual physical with labs-scheduled for 12/12/2023.      Review of Systems   Constitutional, HEENT, cardiovascular, pulmonary, gi and gu systems are negative, except as otherwise noted.      Objective    /60   Pulse 83   Temp 97.1  F (36.2  C) (Temporal)   Resp 16   Ht 1.59 m (5' 2.6\")   Wt 115.9 kg (255 lb 9.6 oz)   LMP 07/04/2023 (Within Days)   SpO2 99%   BMI 45.86 kg/m    Body mass index is 45.86 kg/m .  Physical Exam   GENERAL: healthy, alert and no distress  MS: no gross musculoskeletal defects noted, no edema  PSYCH: mentation appears normal, affect normal/bright    DATA  Previous labs/test (EMG) reviewed.   FIT test supplies given              "

## 2023-10-17 NOTE — COMMUNITY RESOURCES LIST (ENGLISH)
10/17/2023   [facility]  N/A  For questions about this resource list or additional care needs, please contact your primary care clinic or care manager.  Phone: [phone]   Email: N/A   Address: [address] [place] [id number]   Hours: N/A        Financial Stability       Utility payment assistance  1  [facility] [employer] ([employer]) - Energy Assistance Program Distance: 2.02 miles      In-Person, Phone/Virtual   [address] [place] [address]  Language: English  Hours: Mon - Fri 8:00 AM - 4:30 PM  Fees: Free   Phone: (446) 439-3879 Email: accap@accap.org Website: http://www.accap.org     2  [facility]  Office - [facility] Distance: 2.02 miles      Phone/Virtual   [address] [place] [address]  Language: English  Hours: Mon - Fri 8:30 AM - 12:00 PM , Mon - Fri 1:00 PM - 4:00 PM  Fees: Free   Phone: (310) 639-6743 Email: oneyda@[email or web address].[employer].org Website: https://www.[employer].org/usn/          Important Numbers & Websites       Emergency Services   911  [facility] Services   311  Poison Control   (163) 182-2135  Suicide Prevention Lifeline   (952) 488-3497 (TALK)  Child Abuse Hotline   (784) 839-2692 (4-A-Child)  Sexual Assault Hotline   (943) 874-1144 (HOPE)  National Runaway Safeline   (798) 220-3014 (RUNAWAY)  All-Options Talkline   (214) 206-2456  Substance Abuse Referral   (635) 479-2133 (HELP)

## 2023-10-17 NOTE — COMMUNITY RESOURCES LIST (ENGLISH)
10/17/2023   [facility] [employer] [facility]  N/A  For questions about this resource list or additional care needs, please contact your primary care clinic or care manager.  Phone: [phone]   Email: N/A   Address: [address] [place] [address]   Hours: N/A        Financial Stability       Utility payment assistance  1  [facility] [employer] ([employer]AP) - Energy Assistance Program Distance: 2.02 miles      In-Person, Phone/Virtual   [address] [place] [address]  Language: English  Hours: Mon - Fri 8:00 AM - 4:30 PM  Fees: Free   Phone: (129) 392-7827 Email: accap@accap.org Website: http://www.accap.org     2  [facility]  Office - [facility] Distance: 2.02 miles      Phone/Virtual   [address] [place] [address]  Language: English  Hours: Mon - Fri 8:30 AM - 12:00 PM , Mon - Fri 1:00 PM - 4:00 PM  Fees: Free   Phone: (422) 627-4346 Email: oneyda@[email or web address].[employer].org Website: https://www.[employer].org/usn/          Important Numbers & Websites       Emergency Services   911  [facility] Services   311  Poison Control   (480) 556-7188  Suicide Prevention Lifeline   (169) 170-3989 (TALK)  Child Abuse Hotline   (214) 291-2507 (4-A-Child)  Sexual Assault Hotline   (135) 696-5811 (HOPE)  National Runaway Safeline   (978) 623-8012 (RUNAWAY)  All-Options Talkline   (460) 350-6218  Substance Abuse Referral   (856) 245-1415 (HELP)

## 2023-10-23 NOTE — PROGRESS NOTES
CHIEF COMPLAINT:   Chief Complaint   Patient presents with    Cts     Bilateral hand/wrist N/T. Specifically the numbness is in the last two digits and the thumb. Onset: a few years. Patient has not made a work comp claim yet but says it is work comp. In the last 3 months she has been dropping things. Typing, squeezing for long periods of time will increase the falling asleep of her hands. She will have pain occasionally. No tx. Right hand is worse. She is right handed.       Stephanie Mccarthy is seen today in the [facility] for evaluation of bilateral hand pain with numbness and tingling  at the request of Dr. Cuca Ho       HISTORY:  Stephanie Mccarthy is a 48 year old female , Right -hand dominant who is seen in for Bilateral hand numbness and tingling, pain, and weakness for years. Right more than left.  The past several months has noticed dropping things, particularly the right hand.   The symptoms have been constant, and not helped by wearing braces during the day, has not tried at night.   Has not tried night bracing.  she has numbness and tingling in all fingers, but more so the thumb ring/small fingers. Also in to the wrist and pain up the arm.  Other symptoms include dropping things, pain up arm, and neck pain.       Suspected cause: she relates due to work activities.    Pain severity: 6/10  Pain quality: dull, aching, and pins and needles  Frequency of symptoms: are constant.  Aggravating Factors: typing, squeezing.  Relieving Factors: rest. Shaking hands or dangling hands.    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    She has no past medical history of Actinic keratosis, Allergies, Amblyopia,  Basal cell carcinoma, Cataract, Diabetes mellitus (H), Diabetic retinopathy (H), Eczema, Glaucoma (increased eye pressure), Heart valve disorder, Malignant melanoma nos, Pacemaker, Photosensitive contact dermatitis, Psoriasis, Retinal detachment, Senile macular degeneration, Skin cancer, Squamous cell carcinoma, Strabismus, Type II or unspecified type diabetes mellitus without mention of complication, not stated as uncontrolled, Urticaria, or Uveitis.  Patient Active Problem List   Diagnosis    Mild major depression (H)    Anxiety    Acne    CARDIOVASCULAR SCREENING; LDL GOAL LESS THAN 160    IUD (intrauterine device) in place, paragard 11/2010    Hx gestational diabetes    Chondromalacia of patella    Mild intermittent asthma    Morbid obesity (H)    Essential hypertension    STELLA (obstructive sleep apnea)- severe (AHI 54)    Chronic kidney disease, stage 1       Surgical Hx:  has a past surgical history that includes Dental surgery (01/01/2006) and Lasik (01/01/2006).    Medications:   Current Outpatient Medications:     buPROPion (ZYBAN) 150 MG 12 hr tablet, Take 1 tablet (150 mg) by mouth 2 times daily After your quit date treatment generally continues 7-12 weeks. Take your afternoon dose no later than 4pm, Disp: 60 tablet, Rfl: 2    losartan (COZAAR) 25 MG tablet, Take 1 tablet (25 mg) by mouth daily, Disp: 90 tablet, Rfl: 3    naltrexone (DEPADE/REVIA) 50 MG tablet, Take 1/2 tablet once daily 1-2 hours prior to worst cravings for 1 week, then increase to 1 tablet daily as directed if tolerating, Disp: 30 tablet, Rfl: 3    phentermine (ADIPEX-P) 30 MG capsule, Take 1 capsule (30 mg) by mouth every morning, Disp: 30 capsule, Rfl: 3    topiramate (TOPAMAX) 50 MG tablet, Take 75mg by mouth in AM and 100mg in the afternoon around 2pm., Disp: 105 tablet, Rfl: 11    Allergies:   Allergies   Allergen Reactions    Doxycycline      Pseudotumor cerebri    Tetracycline Other (See Comments)     Pseudotumor cerebri.       "Perfume Difficulty breathing       Social Hx:  (computer work).  reports that she has been smoking cigarettes. She has been smoking an average of .1 packs per day. She has been exposed to tobacco smoke. She has never used smokeless tobacco. She reports current alcohol use. She reports that she does not use drugs.    Family Hx: family history includes Alcohol/Drug in her brother and brother; Breast Cancer in her maternal grandfather; Cancer in her maternal grandmother and paternal grandmother; Cerebrovascular Disease in her brother; Coronary Artery Disease in her father; Diabetes in her maternal grandmother; Eye Surgery in her mother; Heart Disease in her maternal grandmother; Hypertension in her mother; Obesity in her mother..    REVIEW OF SYSTEMS: 10 point ROS neg other than the symptoms noted above in the HPI and PMH. Notables include  CONSTITUTIONAL:NEGATIVE for fever, chills, change in weight  INTEGUMENTARY/SKIN: NEGATIVE for worrisome rashes, moles or lesions  MUSCULOSKELETAL:See HPI above  Neurology: see HPI above.      EXAM:  /79   Pulse 82   Ht 1.59 m (5' 2.6\")   Wt 118.5 kg (261 lb 4.8 oz)   LMP 07/04/2023 (Within Days)   BMI 46.88 kg/m    GENERAL APPEARANCE: healthy, alert and no distress   GAIT: NORMAL  SKIN: no suspicious lesions or rashes  RESPIRATORY: No increased work of breathing.  NEURO:     strength: slight decreased,    thenar fasiculations: negative    Thenar atrophy:slight bilateral .    Sensation intact in all digits,    reflexes normal in upper extremities.   PSYCH:  mentation appears normal and affect normal, not anxious.    MUSCULOSKELETAL:    RIGHT HAND/FINGERS:    Skin intact. No abnormal skin discoloration, erythema or ecchymosis.   No nail pitting or clubbing.  Normal wear pattern, color and tone.  No observable or palpable masses of the fingers or palm or wrist.  No palpable triggering of fingers.   No observable or palpable cords or nodules of the " fingers or palm.    There is no swelling in the wrist, hand, forearm.  There is mild tenderness in the wrist, thenar eminence, hypothenar eminence, carpometacarpal joint of the thumb..  There is no ecchymosis.  There is no erythema of the surrounding skin.  There is no maceration of the skin.  There is no deformity in the area.  Intact extensors. No extensor lag.    Special tests wrist:    Tinel's positive,    Phalen's positive.    Flexion/compression test positive.   Finkelstein's test: negative.    1st carpometacarpal grind: positive     Intact sensation light touch median, radial, ulnar nerves of the hand  Intact sensation to the radial and ulnar digital nerves of the fingers, as well as the finger tips.  Intact epl fpl fdp edc wrist flexion/extension biceps/triceps deltoid  Brisk capillary refill to all fingers.   Palpable radial pulse, 2+.      LEFT HAND/FINGERS:    Skin intact. No abnormal skin discoloration, erythema or ecchymosis.   No nail pitting or clubbing.  Normal wear pattern, color and tone.  No observable or palpable masses of the fingers or palm or wrist.  No palpable triggering of fingers.   No observable or palpable cords or nodules of the fingers or palm.    There is no swelling in the wrist, hand, forearm.  There is mild tenderness in the wrist, carpometacarpal joint of the thumb.  There is no ecchymosis.  There is no erythema of the surrounding skin.  There is no maceration of the skin.  There is no deformity in the area.  Intact extensors. No extensor lag.    Special tests wrist:    Tinel's negative,    Phalen's positive.    Flexion/compression test positive.   Finkelstein's test: negative.    1st carpometacarpal grind: positive     Intact sensation light touch median, radial, ulnar nerves of the hand  Intact sensation to the radial and ulnar digital nerves of the fingers, as well as the finger tips.  Intact epl fpl fdp edc wrist flexion/extension biceps/triceps deltoid  Brisk capillary refill  to all fingers.   Palpable radial pulse, 2+.      XRAYS: none indicated..    SPECIAL STUDIES:  EMG 2/27/2023 Dr Marr, [facility].  This is an abnormal examination. There is electrophysiologic evidence of moderately severe bilateral median mononeuropathies at the wrist (carpal tunnel syndrome).          ASSESSMENT/PLAN: 47yo RHD female with bilateral hand pain, numbness and tingling, carpal tunnel syndrome.    * discussed with patient signs and symptoms consistent with carpal tunnel syndrome. Carpal tunnel syndrome is compression or pinching of the median nerve at the wrist, as it enters the hand. There are many different causes, and in most cases, multifactorial.    * An indepth discussion was had with her about the options for treatment, which included activity modification to avoid aggravating activities, taking breaks during activities that cause symptoms, stretching, NSAIDS to help decrease inflammation and swelling within the carpal tunnel, night splinting, corticosteroid injections, and carpal tunnel release.   * depending upon severity and duration of symptoms, nonoperative treatment is usually initiated, starting with least invasive modalities such as activity modification and a trial of night splints and NSAIDs.  * Cortisone injections are considered to decrease swelling and inflammation within the carpal tunnel and compression of the nerve.   * Lastly, carpal tunnel release should symptoms persist despite trial of nonoperative treatment, or in cases of severe carpal tunnel syndrome.  * risks of surgery, including, but not limited to: bleeding, infection, pain, scar, damage to adjacent structures (nerves, vessels, tendons), temporary versus permanent nerve injury, failure to relieve symptoms, recurrence of symptoms, incomplete release, stiffness, scar sensitivity and tenderness, need for further surgery, risks of anesthesia were discussed.  * in some cases, with severe, prolonged symptoms, or  in situations of underlying peripheral neuropathy, there may be permanent nerve changes not amenable to surgery, that even with surgery, may not resolve.  * in some cases, it may take 6 months to a year or longer for symptoms to improve or resolve, but even then may not completely resolve.    At this time, she's going to figure out if this will be covered by work comp or not and let us know. If not, she's going to proceed with surgery either way.    Should she decide on surgery, we would plan:  * staged  open carpal tunnel release, outpatient procedure, MAC with local anesthesia, starting on the right then the left 3 weeks later.  * will schedule in future at a mutual convenience  * patient to obtain H+P prior to surgery  * return to clinic 2 weeks postoperative for wound check, suture removal, sooner if needed..  * all patient's questions addressed and answered today.      Hansel Mendez M.D., M.S.  Dept. of Orthopaedic Surgery  [facility]

## 2023-10-25 ENCOUNTER — OFFICE VISIT (OUTPATIENT)
Dept: ORTHOPEDICS | Facility: CLINIC | Age: 48
End: 2023-10-25
Payer: COMMERCIAL

## 2023-10-25 VITALS
BODY MASS INDEX: 46.3 KG/M2 | DIASTOLIC BLOOD PRESSURE: 79 MMHG | HEIGHT: 63 IN | SYSTOLIC BLOOD PRESSURE: 117 MMHG | WEIGHT: 261.3 LBS | HEART RATE: 82 BPM

## 2023-10-25 DIAGNOSIS — G56.03 BILATERAL CARPAL TUNNEL SYNDROME: ICD-10-CM

## 2023-10-25 PROCEDURE — 99243 OFF/OP CNSLTJ NEW/EST LOW 30: CPT | Performed by: ORTHOPAEDIC SURGERY

## 2023-10-25 ASSESSMENT — PAIN SCALES - GENERAL: PAINLEVEL: SEVERE PAIN (6)

## 2023-10-25 NOTE — LETTER
10/25/2023         RE: Stephanie Mccarthy  [id number]  [place] [address]        Dear Colleague,    Thank you for referring your patient, Stephanie Mccarthy, to the [facility] [place]. Please see a copy of my visit note below.    CHIEF COMPLAINT:   Chief Complaint   Patient presents with     Cts     Bilateral hand/wrist N/T. Specifically the numbness is in the last two digits and the thumb. Onset: a few years. Patient has not made a work comp claim yet but says it is work comp. In the last 3 months she has been dropping things. Typing, squeezing for long periods of time will increase the falling asleep of her hands. She will have pain occasionally. No tx. Right hand is worse. She is right handed.       Stephanie Mccarthy is seen today in the [facility] for evaluation of bilateral hand pain with numbness and tingling  at the request of Dr. Cuca Ho       HISTORY:  Stephanie Mccarthy is a 48 year old female , Right -hand dominant who is seen in for Bilateral hand numbness and tingling, pain, and weakness for years. Right more than left.  The past several months has noticed dropping things, particularly the right hand.   The symptoms have been constant, and not helped by wearing braces during the day, has not tried at night.   Has not tried night bracing.  she has numbness and tingling in all fingers, but more so the thumb ring/small fingers. Also in to the wrist and pain up the arm.  Other symptoms include dropping things, pain up arm, and neck pain.       Suspected cause: she relates due to work activities.    Pain severity: 6/10  Pain quality: dull, aching, and pins and needles  Frequency of symptoms: are constant.  Aggravating Factors: typing, squeezing.  Relieving Factors: rest. Shaking hands or dangling hands.    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    She has no past medical history of Actinic keratosis, Allergies, Amblyopia, Basal cell carcinoma, Cataract, Diabetes mellitus (H), Diabetic retinopathy (H), Eczema, Glaucoma (increased eye pressure), Heart valve disorder, Malignant melanoma nos, Pacemaker, Photosensitive contact dermatitis, Psoriasis, Retinal detachment, Senile macular degeneration, Skin cancer, Squamous cell carcinoma, Strabismus, Type II or unspecified type diabetes mellitus without mention of complication, not stated as uncontrolled, Urticaria, or Uveitis.  Patient Active Problem List   Diagnosis     Mild major depression (H)     Anxiety     Acne     CARDIOVASCULAR SCREENING; LDL GOAL LESS THAN 160     IUD (intrauterine device) in place, paragard 11/2010     Hx gestational diabetes     Chondromalacia of patella     Mild intermittent asthma     Morbid obesity (H)     Essential hypertension     STELLA (obstructive sleep apnea)- severe (AHI 54)     Chronic kidney disease, stage 1       Surgical Hx:  has a past surgical history that includes Dental surgery (01/01/2006) and Lasik (01/01/2006).    Medications:   Current Outpatient Medications:      buPROPion (ZYBAN) 150 MG 12 hr tablet, Take 1 tablet (150 mg) by mouth 2 times daily After your quit date treatment generally continues 7-12 weeks. Take your afternoon dose no later than 4pm, Disp: 60 tablet, Rfl: 2     losartan (COZAAR) 25 MG tablet, Take 1 tablet (25 mg) by mouth daily, Disp: 90 tablet, Rfl: 3     naltrexone (DEPADE/REVIA) 50 MG tablet, Take 1/2 tablet once daily 1-2 hours prior to worst cravings for 1 week, then increase to 1 tablet daily as directed if tolerating, Disp: 30 tablet, Rfl: 3     phentermine (ADIPEX-P) 30 MG capsule, Take 1 capsule (30 mg) by mouth every morning, Disp: 30 capsule, Rfl: 3      "topiramate (TOPAMAX) 50 MG tablet, Take 75mg by mouth in AM and 100mg in the afternoon around 2pm., Disp: 105 tablet, Rfl: 11    Allergies:   Allergies   Allergen Reactions     Doxycycline      Pseudotumor cerebri     Tetracycline Other (See Comments)     Pseudotumor cerebri.       Perfume Difficulty breathing       Social Hx:  (computer work).  reports that she has been smoking cigarettes. She has been smoking an average of .1 packs per day. She has been exposed to tobacco smoke. She has never used smokeless tobacco. She reports current alcohol use. She reports that she does not use drugs.    Family Hx: family history includes Alcohol/Drug in her brother and brother; Breast Cancer in her maternal grandfather; Cancer in her maternal grandmother and paternal grandmother; Cerebrovascular Disease in her brother; Coronary Artery Disease in her father; Diabetes in her maternal grandmother; Eye Surgery in her mother; Heart Disease in her maternal grandmother; Hypertension in her mother; Obesity in her mother..    REVIEW OF SYSTEMS: 10 point ROS neg other than the symptoms noted above in the HPI and PMH. Notables include  CONSTITUTIONAL:NEGATIVE for fever, chills, change in weight  INTEGUMENTARY/SKIN: NEGATIVE for worrisome rashes, moles or lesions  MUSCULOSKELETAL:See HPI above  Neurology: see HPI above.      EXAM:  /79   Pulse 82   Ht 1.59 m (5' 2.6\")   Wt 118.5 kg (261 lb 4.8 oz)   LMP 07/04/2023 (Within Days)   BMI 46.88 kg/m    GENERAL APPEARANCE: healthy, alert and no distress   GAIT: NORMAL  SKIN: no suspicious lesions or rashes  RESPIRATORY: No increased work of breathing.  NEURO:     strength: slight decreased,    thenar fasiculations: negative    Thenar atrophy:slight bilateral .    Sensation intact in all digits,    reflexes normal in upper extremities.   PSYCH:  mentation appears normal and affect normal, not anxious.    MUSCULOSKELETAL:    RIGHT HAND/FINGERS:    Skin intact. " No abnormal skin discoloration, erythema or ecchymosis.   No nail pitting or clubbing.  Normal wear pattern, color and tone.  No observable or palpable masses of the fingers or palm or wrist.  No palpable triggering of fingers.   No observable or palpable cords or nodules of the fingers or palm.    There is no swelling in the wrist, hand, forearm.  There is mild tenderness in the wrist, thenar eminence, hypothenar eminence, carpometacarpal joint of the thumb..  There is no ecchymosis.  There is no erythema of the surrounding skin.  There is no maceration of the skin.  There is no deformity in the area.  Intact extensors. No extensor lag.    Special tests wrist:    Tinel's positive,    Phalen's positive.    Flexion/compression test positive.   Finkelstein's test: negative.    1st carpometacarpal grind: positive     Intact sensation light touch median, radial, ulnar nerves of the hand  Intact sensation to the radial and ulnar digital nerves of the fingers, as well as the finger tips.  Intact epl fpl fdp edc wrist flexion/extension biceps/triceps deltoid  Brisk capillary refill to all fingers.   Palpable radial pulse, 2+.      LEFT HAND/FINGERS:    Skin intact. No abnormal skin discoloration, erythema or ecchymosis.   No nail pitting or clubbing.  Normal wear pattern, color and tone.  No observable or palpable masses of the fingers or palm or wrist.  No palpable triggering of fingers.   No observable or palpable cords or nodules of the fingers or palm.    There is no swelling in the wrist, hand, forearm.  There is mild tenderness in the wrist, carpometacarpal joint of the thumb.  There is no ecchymosis.  There is no erythema of the surrounding skin.  There is no maceration of the skin.  There is no deformity in the area.  Intact extensors. No extensor lag.    Special tests wrist:    Tinel's negative,    Phalen's positive.    Flexion/compression test positive.   Finkelstein's test: negative.    1st carpometacarpal  grind: positive     Intact sensation light touch median, radial, ulnar nerves of the hand  Intact sensation to the radial and ulnar digital nerves of the fingers, as well as the finger tips.  Intact epl fpl fdp edc wrist flexion/extension biceps/triceps deltoid  Brisk capillary refill to all fingers.   Palpable radial pulse, 2+.      XRAYS: none indicated..    SPECIAL STUDIES:  EMG 2/27/2023 Dr Marr, [facility].  This is an abnormal examination. There is electrophysiologic evidence of moderately severe bilateral median mononeuropathies at the wrist (carpal tunnel syndrome).          ASSESSMENT/PLAN: 49yo RHD female with bilateral hand pain, numbness and tingling, carpal tunnel syndrome.    * discussed with patient signs and symptoms consistent with carpal tunnel syndrome. Carpal tunnel syndrome is compression or pinching of the median nerve at the wrist, as it enters the hand. There are many different causes, and in most cases, multifactorial.    * An indepth discussion was had with her about the options for treatment, which included activity modification to avoid aggravating activities, taking breaks during activities that cause symptoms, stretching, NSAIDS to help decrease inflammation and swelling within the carpal tunnel, night splinting, corticosteroid injections, and carpal tunnel release.   * depending upon severity and duration of symptoms, nonoperative treatment is usually initiated, starting with least invasive modalities such as activity modification and a trial of night splints and NSAIDs.  * Cortisone injections are considered to decrease swelling and inflammation within the carpal tunnel and compression of the nerve.   * Lastly, carpal tunnel release should symptoms persist despite trial of nonoperative treatment, or in cases of severe carpal tunnel syndrome.  * risks of surgery, including, but not limited to: bleeding, infection, pain, scar, damage to adjacent structures (nerves,  vessels, tendons), temporary versus permanent nerve injury, failure to relieve symptoms, recurrence of symptoms, incomplete release, stiffness, scar sensitivity and tenderness, need for further surgery, risks of anesthesia were discussed.  * in some cases, with severe, prolonged symptoms, or in situations of underlying peripheral neuropathy, there may be permanent nerve changes not amenable to surgery, that even with surgery, may not resolve.  * in some cases, it may take 6 months to a year or longer for symptoms to improve or resolve, but even then may not completely resolve.    At this time, she's going to figure out if this will be covered by work comp or not and let us know. If not, she's going to proceed with surgery either way.    Should she decide on surgery, we would plan:  * staged  open carpal tunnel release, outpatient procedure, MAC with local anesthesia, starting on the right then the left 3 weeks later.  * will schedule in future at a mutual convenience  * patient to obtain H+P prior to surgery  * return to clinic 2 weeks postoperative for wound check, suture removal, sooner if needed..  * all patient's questions addressed and answered today.      Hansel Mendez M.D., M.S.  Dept. of Orthopaedic Surgery  [facility]      Again, thank you for allowing me to participate in the care of your patient.        Sincerely,        Hansel Mendez MD

## 2023-10-29 ENCOUNTER — HEALTH MAINTENANCE LETTER (OUTPATIENT)
Age: 48
End: 2023-10-29

## 2023-10-30 PROCEDURE — 82274 ASSAY TEST FOR BLOOD FECAL: CPT | Performed by: FAMILY MEDICINE

## 2023-11-01 LAB — HEMOCCULT STL QL IA: NEGATIVE

## 2023-12-12 ENCOUNTER — OFFICE VISIT (OUTPATIENT)
Dept: FAMILY MEDICINE | Facility: CLINIC | Age: 48
End: 2023-12-12
Payer: COMMERCIAL

## 2023-12-12 VITALS
SYSTOLIC BLOOD PRESSURE: 126 MMHG | BODY MASS INDEX: 47.31 KG/M2 | OXYGEN SATURATION: 95 % | DIASTOLIC BLOOD PRESSURE: 86 MMHG | WEIGHT: 267 LBS | TEMPERATURE: 97.6 F | RESPIRATION RATE: 20 BRPM | HEIGHT: 63 IN | HEART RATE: 84 BPM

## 2023-12-12 DIAGNOSIS — E66.01 MORBID OBESITY (H): ICD-10-CM

## 2023-12-12 DIAGNOSIS — E53.8 VITAMIN B12 DEFICIENCY (NON ANEMIC): ICD-10-CM

## 2023-12-12 DIAGNOSIS — Z23 NEED FOR VACCINATION: ICD-10-CM

## 2023-12-12 DIAGNOSIS — Z13.220 LIPID SCREENING: ICD-10-CM

## 2023-12-12 DIAGNOSIS — R80.9 MICROALBUMINURIA: ICD-10-CM

## 2023-12-12 DIAGNOSIS — Z00.00 ROUTINE GENERAL MEDICAL EXAMINATION AT A HEALTH CARE FACILITY: Primary | ICD-10-CM

## 2023-12-12 DIAGNOSIS — N18.1 CHRONIC KIDNEY DISEASE, STAGE 1: ICD-10-CM

## 2023-12-12 DIAGNOSIS — F17.200 NICOTINE DEPENDENCE, UNCOMPLICATED, UNSPECIFIED NICOTINE PRODUCT TYPE: ICD-10-CM

## 2023-12-12 DIAGNOSIS — I10 HYPERTENSION WITH GOAL BLOOD PRESSURE LESS THAN 140/90: ICD-10-CM

## 2023-12-12 LAB
CREAT UR-MCNC: 168 MG/DL
MICROALBUMIN UR-MCNC: 19.1 MG/L
MICROALBUMIN/CREAT UR: 11.37 MG/G CR (ref 0–25)

## 2023-12-12 PROCEDURE — 90472 IMMUNIZATION ADMIN EACH ADD: CPT | Performed by: FAMILY MEDICINE

## 2023-12-12 PROCEDURE — 90686 IIV4 VACC NO PRSV 0.5 ML IM: CPT | Performed by: FAMILY MEDICINE

## 2023-12-12 PROCEDURE — 90715 TDAP VACCINE 7 YRS/> IM: CPT | Performed by: FAMILY MEDICINE

## 2023-12-12 PROCEDURE — 82043 UR ALBUMIN QUANTITATIVE: CPT | Performed by: FAMILY MEDICINE

## 2023-12-12 PROCEDURE — 91320 SARSCV2 VAC 30MCG TRS-SUC IM: CPT | Performed by: FAMILY MEDICINE

## 2023-12-12 PROCEDURE — 90480 ADMN SARSCOV2 VAC 1/ONLY CMP: CPT | Performed by: FAMILY MEDICINE

## 2023-12-12 PROCEDURE — 99396 PREV VISIT EST AGE 40-64: CPT | Mod: 25 | Performed by: FAMILY MEDICINE

## 2023-12-12 PROCEDURE — 90471 IMMUNIZATION ADMIN: CPT | Performed by: FAMILY MEDICINE

## 2023-12-12 PROCEDURE — 99214 OFFICE O/P EST MOD 30 MIN: CPT | Mod: 25 | Performed by: FAMILY MEDICINE

## 2023-12-12 PROCEDURE — 82570 ASSAY OF URINE CREATININE: CPT | Performed by: FAMILY MEDICINE

## 2023-12-12 RX ORDER — BUPROPION HYDROCHLORIDE 150 MG/1
150 TABLET, FILM COATED, EXTENDED RELEASE ORAL 2 TIMES DAILY
Qty: 180 TABLET | Refills: 1 | Status: SHIPPED | OUTPATIENT
Start: 2023-12-12 | End: 2024-09-16

## 2023-12-12 RX ORDER — LOSARTAN POTASSIUM 25 MG/1
25 TABLET ORAL DAILY
Qty: 90 TABLET | Refills: 3 | Status: SHIPPED | OUTPATIENT
Start: 2023-12-12

## 2023-12-12 ASSESSMENT — ENCOUNTER SYMPTOMS
HEMATOCHEZIA: 0
SORE THROAT: 0
HEMATURIA: 0
FREQUENCY: 0
JOINT SWELLING: 0
HEADACHES: 0
NERVOUS/ANXIOUS: 0
DIARRHEA: 0
BREAST MASS: 0
PALPITATIONS: 0
COUGH: 0
FEVER: 0
PARESTHESIAS: 0
SHORTNESS OF BREATH: 0
CONSTIPATION: 0
NAUSEA: 0
EYE PAIN: 0
WEAKNESS: 0
MYALGIAS: 0
ABDOMINAL PAIN: 0
CHILLS: 0
HEARTBURN: 0
DYSURIA: 0
DIZZINESS: 0
ARTHRALGIAS: 0

## 2023-12-12 ASSESSMENT — ASTHMA QUESTIONNAIRES: ACT_TOTALSCORE: 25

## 2023-12-12 NOTE — PROGRESS NOTES
SUBJECTIVE:   Renate is a 48 year old, presenting for the following:  Physical        12/12/2023     8:33 AM   Additional Questions   Roomed by Elle PINEDO         12/12/2023     8:33 AM   Patient Reported Additional Medications   Patient reports taking the following new medications None       Healthy Habits:     Getting at least 3 servings of Calcium per day:  NO    Bi-annual eye exam:  Yes    Dental care twice a year:  Yes    Sleep apnea or symptoms of sleep apnea:  Sleep apnea    Diet:  Regular (no restrictions)    Frequency of exercise:  1 day/week    Duration of exercise:  Less than 15 minutes    Taking medications regularly:  Yes    Medication side effects:  None    Additional concerns today:  No    Hypertension Follow-up  Currently on Losartan 25 mg daily.  Tolerating well, no side effects reported  Do you check your blood pressure regularly outside of the clinic? Yes   Are you following a low salt diet? Yes  Are your blood pressures ever more than 140 on the top number (systolic) OR more   than 90 on the bottom number (diastolic), for example 140/90? No      Seeing Endo for Weight loss-  Currently on Topamax, Natrexone and Phentermine.   Reporting nauseas with Naltrexone.  Body mass index is 47.61 kg/m .    Wt Readings from Last 4 Encounters:   12/12/23 121.1 kg (267 lb)   10/25/23 118.5 kg (261 lb 4.8 oz)   10/17/23 115.9 kg (255 lb 9.6 oz)   10/04/23 116.1 kg (256 lb)     Carpal Tunnel Syndrome-  Planning to follow up with Ortho next steps in 2024.  Patient was hoping that it would be Workmen's Comp apparently did not report it within 180 days of occurrence and will use short-term disability instead.      Tobacco use-   Still cutting back. Smokes occasionally.   On Zyban.     Previous labs revealed low vitamin B12.  Component      Latest Ref Rng 12/16/2022  7:45 AM   Vitamin B12      232 - 1,245 pg/mL 228 (L)       Plan to repeat labs.    HEALTH CARE MAINTENANCE: Due for mammogram- orders on file,  screening labs and vaccines.        Social History     Tobacco Use    Smoking status: Some Days     Packs/day: .1     Types: Cigarettes     Last attempt to quit: 10/1/2009     Years since quittin.2     Passive exposure: Current    Smokeless tobacco: Never    Tobacco comments:     Smoking household   Substance Use Topics    Alcohol use: Yes     Comment: Occasional             2023     8:12 AM   Alcohol Use   Prescreen: >3 drinks/day or >7 drinks/week? No     Reviewed orders with patient.  Reviewed health maintenance and updated orders accordingly - Yes  Labs reviewed in EPIC  BP Readings from Last 3 Encounters:   23 126/86   10/25/23 117/79   10/17/23 128/60    Wt Readings from Last 3 Encounters:   23 121.1 kg (267 lb)   10/25/23 118.5 kg (261 lb 4.8 oz)   10/17/23 115.9 kg (255 lb 9.6 oz)                  Patient Active Problem List   Diagnosis    Mild major depression (H)    Anxiety    Acne    CARDIOVASCULAR SCREENING; LDL GOAL LESS THAN 160    IUD (intrauterine device) in place, paragard 2010    Hx gestational diabetes    Chondromalacia of patella    Mild intermittent asthma    Morbid obesity (H)    Essential hypertension    STELLA (obstructive sleep apnea)- severe (AHI 54)    Chronic kidney disease, stage 1     Past Surgical History:   Procedure Laterality Date    DENTAL SURGERY  2006    LASIK  2006       Social History     Tobacco Use    Smoking status: Some Days     Packs/day: .1     Types: Cigarettes     Last attempt to quit: 10/1/2009     Years since quittin.2     Passive exposure: Current    Smokeless tobacco: Never    Tobacco comments:     Smoking household   Substance Use Topics    Alcohol use: Yes     Comment: Occasional     Family History   Problem Relation Age of Onset    Eye Surgery Mother         cataracts    Obesity Mother     Hypertension Mother     Coronary Artery Disease Father         nonfatal MI @ 52    Cancer Maternal Grandmother         lung    Heart  Disease Maternal Grandmother     Diabetes Maternal Grandmother     Breast Cancer Maternal Grandfather         mets    Cancer Paternal Grandmother     Alcohol/Drug Brother     Cerebrovascular Disease Brother     Alcohol/Drug Brother     Thyroid Disease No family hx of     Glaucoma No family hx of     Macular Degeneration No family hx of          Current Outpatient Medications   Medication Sig Dispense Refill    buPROPion (ZYBAN) 150 MG 12 hr tablet Take 1 tablet (150 mg) by mouth 2 times daily 180 tablet 1    losartan (COZAAR) 25 MG tablet Take 1 tablet (25 mg) by mouth daily 90 tablet 3    phentermine (ADIPEX-P) 30 MG capsule Take 1 capsule (30 mg) by mouth every morning 30 capsule 3    topiramate (TOPAMAX) 50 MG tablet Take 75mg by mouth in AM and 100mg in the afternoon around 2pm. 105 tablet 11    naltrexone (DEPADE/REVIA) 50 MG tablet Take 1/2 tablet once daily 1-2 hours prior to worst cravings for 1 week, then increase to 1 tablet daily as directed if tolerating (Patient not taking: Reported on 10/25/2023) 30 tablet 3     Allergies   Allergen Reactions    Doxycycline      Pseudotumor cerebri    Tetracycline Other (See Comments)     Pseudotumor cerebri.      Perfume Difficulty breathing       Breast Cancer Screenin/4/2022     7:50 AM 2023     8:14 AM   Breast CA Risk Assessment (FHS-7)   Do you have a family history of breast, colon, or ovarian cancer? Yes No / Unknown       Mammogram Screening - Offered annual screening and updated Health Maintenance for mutual plan based on risk factor consideration  Pertinent mammograms are reviewed under the imaging tab.    History of abnormal Pap smear: NO - age 30-65 PAP every 5 years with negative HPV co-testing recommended      Latest Ref Rng & Units 3/3/2021     1:50 PM 3/3/2021     1:27 PM 2016    12:00 AM   PAP / HPV   PAP (Historical)   NIL  NIL    HPV 16 DNA NEG^Negative Negative      HPV 18 DNA NEG^Negative Negative      Other HR HPV  "NEG^Negative Negative        Reviewed and updated as needed this visit by clinical staff   Tobacco  Allergies  Meds              Reviewed and updated as needed this visit by Provider                     Review of Systems   Constitutional:  Negative for chills and fever.   HENT:  Negative for congestion, ear pain, hearing loss and sore throat.    Eyes:  Negative for pain and visual disturbance.   Respiratory:  Negative for cough and shortness of breath.    Cardiovascular:  Negative for chest pain, palpitations and peripheral edema.   Gastrointestinal:  Negative for abdominal pain, constipation, diarrhea, heartburn, hematochezia and nausea.   Breasts:  Negative for tenderness, breast mass and discharge.   Genitourinary:  Negative for dysuria, frequency, genital sores, hematuria, pelvic pain, urgency, vaginal bleeding and vaginal discharge.   Musculoskeletal:  Negative for arthralgias, joint swelling and myalgias.   Skin:  Negative for rash.   Neurological:  Negative for dizziness, weakness, headaches and paresthesias.   Psychiatric/Behavioral:  Negative for mood changes. The patient is not nervous/anxious.         OBJECTIVE:   /86 (BP Location: Left arm, Patient Position: Chair, Cuff Size: Adult Large)   Pulse 84   Temp 97.6  F (36.4  C) (Tympanic)   Resp 20   Ht 1.595 m (5' 2.8\")   Wt 121.1 kg (267 lb)   LMP 07/04/2023 (Within Days)   SpO2 95%   BMI 47.61 kg/m    Physical Exam  GENERAL: healthy, alert and no distress  EYES: Eyes grossly normal to inspection, PERRL and conjunctivae and sclerae normal  HENT: ear canals and TM's normal, nose and mouth without ulcers or lesions  NECK: no adenopathy, no asymmetry, masses, or scars and thyroid normal to palpation  RESP: lungs clear to auscultation - no rales, rhonchi or wheezes  BREAST: normal without masses, tenderness or nipple discharge and no palpable axillary masses or adenopathy  CV: regular rate and rhythm, normal S1 S2, no S3 or S4, no murmur, " click or rub, no peripheral edema and peripheral pulses strong  ABDOMEN: soft, nontender, no hepatosplenomegaly, no masses and bowel sounds normal  MS: no gross musculoskeletal defects noted, no edema  SKIN: no suspicious lesions or rashes  NEURO: Normal strength and tone, mentation intact and speech normal  PSYCH: mentation appears normal, affect normal/bright    Diagnostic Test Results:  Labs reviewed in Epic    ASSESSMENT/PLAN:   Renate was seen today for physical.    Diagnoses and all orders for this visit:    Routine general medical examination at a health care facility  -     REVIEW OF HEALTH MAINTENANCE PROTOCOL ORDERS  -     Adult Eye  Referral; Future  -     PRIMARY CARE FOLLOW-UP SCHEDULING; Future  -     Comprehensive metabolic panel (BMP + Alb, Alk Phos, ALT, AST, Total. Bili, TP); Future  -     CBC with platelets; Future    Lipid screening  -     Lipid panel reflex to direct LDL Fasting; Future      Hypertension with goal blood pressure less than 140/90, controlled  -     Albumin Random Urine Quantitative with Creat Ratio  -     Refill: Losartan (COZAAR) 25 MG tablet; Take 1 tablet (25 mg) by mouth daily    Chronic kidney disease, stage 1 with Microalbuminuria  -     Albumin Random Urine Quantitative with Creat Ratio; Future  -     Refill: Losartan (COZAAR) 25 MG tablet; Take 1 tablet (25 mg) by mouth daily    Vitamin B12 deficiency (non anemic)  -     Vitamin B12; Future    Nicotine dependence, uncomplicated, unspecified nicotine product type  -     Refill: buPROPion (ZYBAN) 150 MG 12 hr tablet; Take 1 tablet (150 mg) by mouth 2 times daily    Morbid obesity (H)       -     Continue weight loss medication under the care of endocrinology.        -     Weight management plan: Discussed healthy diet and exercise guidelines      Need for vaccination  -     INFLUENZA VACCINE IM > 6 MONTHS VALENT IIV4 (AFLURIA/FLUZONE)  -     COVID-19 12+ (2023-24) (PFIZER)  -     TDAP 10-64Y  (ADACEL,BOOSTRIX)        Patient has been advised of split billing requirements and indicates understanding: Yes      COUNSELING:  Reviewed preventive health counseling, as reflected in patient instructions       Regular exercise       Healthy diet/nutrition        She reports that she has been smoking cigarettes. She has been smoking an average of .1 packs per day. She has been exposed to tobacco smoke. She has never used smokeless tobacco.  Nicotine/Tobacco Cessation Plan:   Pharmacotherapies : bupropion (Zyban)  Self help information given to patient      Return in about 6 months (around 6/12/2024) for Follow up, Medication check.      Cuca Ho MD  [facility]

## 2024-02-05 ENCOUNTER — VIRTUAL VISIT (OUTPATIENT)
Dept: ENDOCRINOLOGY | Facility: CLINIC | Age: 49
End: 2024-02-05
Payer: COMMERCIAL

## 2024-02-05 VITALS — BODY MASS INDEX: 47.31 KG/M2 | HEIGHT: 63 IN | WEIGHT: 267 LBS

## 2024-02-05 DIAGNOSIS — E66.01 MORBID OBESITY (H): Primary | ICD-10-CM

## 2024-02-05 PROCEDURE — 99213 OFFICE O/P EST LOW 20 MIN: CPT | Mod: 95 | Performed by: INTERNAL MEDICINE

## 2024-02-05 ASSESSMENT — PAIN SCALES - GENERAL: PAINLEVEL: NO PAIN (0)

## 2024-02-05 NOTE — NURSING NOTE
Is the patient currently in the state of [place]? YES    Visit mode:VIDEO    If the visit is dropped, the patient can be reconnected by: VIDEO VISIT: Send to e-mail at: [email or web address]    Will anyone else be joining the visit? NO  (If patient encounters technical issues they should call [phone]    How would you like to obtain your AVS? MyChart    Are changes needed to the allergy or medication list? Pt stated no changes to allergies and Pt stated no med changes  Please remove any meds marked not taking and any flagged for removal.    Reason for visit: RECHECK    Wt/ht other than 24 hrs:  December 12  Pain more than one location:  no  Elle PIERRE

## 2024-02-05 NOTE — PROGRESS NOTES
Virtual Visit Details    Joined the call at 2/5/2024, 11:32:23 am.  Left the call at 2/5/2024, 11:40:53    Originating Location (pt. Location): Home    Distant Location (provider location):  Off-site  Platform used for Video Visit: Zachary

## 2024-02-05 NOTE — PROGRESS NOTES
"    Return Medical Weight Management Note     Stephanie Mccarthy  MRN:  [id number]  :  1975  YAHAIRA:  24    Dear [facility],    I had the pleasure of seeing your patient Stephanie Mccarthy.  She is a 42 year old female who is being seen for treatment of obesity.    CURRENT WEIGHT:   267 lbs 0 oz    Wt Readings from Last 4 Encounters:   24 121.1 kg (267 lb)   23 121.1 kg (267 lb)   10/25/23 118.5 kg (261 lb 4.8 oz)   10/17/23 115.9 kg (255 lb 9.6 oz)     Height:  5' 3\"  Body Mass Index:  Body mass index is 47.3 kg/m .  Vitals:  None at this visit    Initial consult weight was 264 lbs on .  Weight change since last visit 10/04/23 is up 12.   Total gain is 3 pounds.    INTERVAL HISTORY:  Tried but did like or continue naltrexone. Has maintained phentermine and topiramate although had hiatus when had GI illness.         No data to display                MEDICATIONS:   Current Outpatient Medications   Medication    buPROPion (ZYBAN) 150 MG 12 hr tablet    losartan (COZAAR) 25 MG tablet    phentermine (ADIPEX-P) 30 MG capsule    topiramate (TOPAMAX) 50 MG tablet    naltrexone (DEPADE/REVIA) 50 MG tablet     No current facility-administered medications for this visit.         2024     9:46 AM   Weight Loss Medication History Reviewed With Patient   Which weight loss medications are you currently taking on a regular basis? Phentermine    Topamax (topiramate)   Are you having any side effects from the weight loss medication that we have prescribed you? No     Ht 1.6 m (5' 3\")   Wt 121.1 kg (267 lb)   BMI 47.30 kg/m      ASSESSMENT:   Re-focus on food plan with focus on no between meal snacking, aggressive lowering of starches and cheese. Maintain phentermine 30 mg (AM) and topiramate to 75 mg (AM) and 100 mg at 2:00 pm. Will trial addition of zepbound.    FOLLOW-UP:    3 months    Rober Nam MD   "

## 2024-02-05 NOTE — LETTER
"2024       RE: Stephanie Mccarthy  [id number] [address] [facility] [address]     Dear Colleague,    Thank you for referring your patient, Stephanie Mccarthy, to the [facility] WEIGHT MANAGEMENT CLINIC [place] at [facility]. Please see a copy of my visit note below.    Virtual Visit Details    Joined the call at 2024, 11:32:23 am.  Left the call at 2024, 11:40:53    Originating Location (pt. Location): Home    Distant Location (provider location):  Off-site  Platform used for Video Visit: [facility] Medical Weight Management Note     Stephanie Mccarthy  MRN:  [id number]  :  1975  YAHAIRA:  24    Dear [facility],    I had the pleasure of seeing your patient Stephanie Mccarthy.  She is a 42 year old female who is being seen for treatment of obesity.    CURRENT WEIGHT:   267 lbs 0 oz    Wt Readings from Last 4 Encounters:   24 121.1 kg (267 lb)   23 121.1 kg (267 lb)   10/25/23 118.5 kg (261 lb 4.8 oz)   10/17/23 115.9 kg (255 lb 9.6 oz)     Height:  5' 3\"  Body Mass Index:  Body mass index is 47.3 kg/m .  Vitals:  None at this visit    Initial consult weight was 264 lbs on .  Weight change since last visit 10/04/23 is up 12.   Total gain is 3 pounds.    INTERVAL HISTORY:  Tried but did like or continue naltrexone. Has maintained phentermine and topiramate although had hiatus when had GI illness.         No data to display                MEDICATIONS:   Current Outpatient Medications   Medication    buPROPion (ZYBAN) 150 MG 12 hr tablet    losartan (COZAAR) 25 MG tablet    phentermine (ADIPEX-P) 30 MG capsule    topiramate (TOPAMAX) 50 MG tablet    naltrexone (DEPADE/REVIA) 50 MG tablet     No current facility-administered medications for this visit.         2024     9:46 AM   Weight Loss Medication History Reviewed With Patient   Which weight loss medications are you currently taking on a regular basis? " "Phentermine    Topamax (topiramate)   Are you having any side effects from the weight loss medication that we have prescribed you? No     Ht 1.6 m (5' 3\")   Wt 121.1 kg (267 lb)   BMI 47.30 kg/m      ASSESSMENT:   Re-focus on food plan with focus on no between meal snacking, aggressive lowering of starches and cheese. Maintain phentermine 30 mg (AM) and topiramate to 75 mg (AM) and 100 mg at 2:00 pm. Will trial addition of zepbound.    FOLLOW-UP:    3 months    Rober Nam MD   "

## 2024-02-07 ENCOUNTER — TELEPHONE (OUTPATIENT)
Dept: ENDOCRINOLOGY | Facility: CLINIC | Age: 49
End: 2024-02-07
Payer: COMMERCIAL

## 2024-02-07 ENCOUNTER — MYC MEDICAL ADVICE (OUTPATIENT)
Dept: ENDOCRINOLOGY | Facility: CLINIC | Age: 49
End: 2024-02-07
Payer: COMMERCIAL

## 2024-02-07 DIAGNOSIS — E66.01 MORBID OBESITY (H): ICD-10-CM

## 2024-02-07 NOTE — TELEPHONE ENCOUNTER
PRIOR AUTHORIZATION DENIED      Medication: ZEPBOUND 2.5 MG/0.5ML SC SOAJ  Insurance Company: [employer] - Phone [phone] Fax [phone]  Denial Date:    Denial Reason(s):   Appeal Information: no appeal option  Patient Notified: yes

## 2024-02-08 RX ORDER — PHENTERMINE HYDROCHLORIDE 30 MG/1
30 CAPSULE ORAL EVERY MORNING
Qty: 30 CAPSULE | Refills: 3 | Status: SHIPPED | OUTPATIENT
Start: 2024-02-08 | End: 2024-05-29

## 2024-02-09 RX ORDER — TOPIRAMATE 50 MG/1
50 TABLET, FILM COATED ORAL 2 TIMES DAILY
Qty: 180 TABLET | Refills: 1 | Status: SHIPPED | OUTPATIENT
Start: 2024-02-09 | End: 2024-09-05

## 2024-03-08 NOTE — PROGRESS NOTES
"Stephanie is a 46 year old who is being evaluated via a billable video visit.      How would you like to obtain your AVS? MyChart  If the video visit is dropped, the invitation should be resent by: Text to cell phone: [phone]  Will anyone else be joining your video visit? No      Crystal Coto CMA on 3/19/2021 at 9:26 AM    Video Start Time: 10:01 AM  Video-Visit Details    Type of service:  Video Visit    Video End Time:10:33 AM    Originating Location (pt. Location): Home    Distant Location (provider location):  [facility] SLEEP [facility]     Platform used for Video Visit: 9GAG      Sleep Consultation:    Date on this visit: 3/19/2021    Primary Physician: Cuca Ho     Chief Complaint   Patient presents with     Consult     Snoring       Stephanie Mccarthy is a 46 year old female with medical history remarkable for hypertension, asthma, depression, anxiety and morbid obesity. [place] sent by Cuca Ho MD for a sleep consultation regarding snoring and daytime sleepiness.    Stephanie goes to sleep between 8 and 9 PM during the week. She wakes up between 6 and 7 AM without an alarm. She falls asleep in 15 minutes.  Stephanie denies difficulty falling asleep.  She wakes up 3-4 times a night for a few minutes before falling back to sleep.  Stephanie wakes up to go to the bathroom and uncertain reasons.  On weekends, Stephanie goes to sleep at 11:00 PM.  She wakes up at 9:00 AM without an alarm. She falls asleep in 15 minutes.  Patient gets 6-7 hours of \"good sleep\" per night. She is not refreshed.     Patient does not use electronics in bed and watch TV in bed.     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  She is a software tech.     Stephanie does snore every night and snoring is loud. Patient does have a regular bed partner. There is report of snoring.  She does have witnessed apneas. They never sleep separately.  Patient sleeps on her side. She has no morning headaches, morning " Health Maintenance Due   Topic Date Due    Osteoporosis Screening  06/30/2023    Pneumococcal Vaccine 65+ (1 of 1 - PCV) Never done    COVID-19 Vaccine (3 - 2023-24 season) 09/01/2023    Medicare Advantage- Medicare Wellness Visit  01/01/2024    Depression Screening  08/02/2024       Patient is due for topics listed above, she wishes to proceed with Depression Screening , MWV (Medicare Wellness Visit), and Osteoporosis screening, but is not proceeding with Immunization(s) COVID-19 and Pneumococcal at this time.    confusion. She reports rare restless legs. Stephanie has bruxism and denies any sleep walking, dream enactment, sleep paralysis, cataplexy and hypnogogic/hypnopompic hallucinations.    Stephanie denies difficulty breathing through her nose.      Stephanie has gained 15 pounds in the last year.  Patient describes themself as a morning person.  She would prefer to go to sleep at 10:00 PM and wake up at 8:00 AM.  Patient's Esmond Sleepiness score 16/24 inconsistent with excessive daytime sleepiness.      Stephanie does not nap. She takes some inadvertant naps.  She denies falling asleep while driving.  Patient was counseled on the importance of driving while alert, to pull over if drowsy, or nap before getting into the vehicle if sleepy.  She uses 6-10 cups/day of coffee. Last caffeine intake is usually before lunch.    Allergies:    Allergies   Allergen Reactions     Doxycycline      Pseudotumor cerebri     Tetracycline Other (See Comments)     Pseudotumor cerebri.       Perfume Difficulty breathing       Medications:    Current Outpatient Medications   Medication Sig Dispense Refill     hydrochlorothiazide (HYDRODIURIL) 25 MG tablet Take 1 tablet (25 mg) by mouth daily 30 tablet 1     topiramate (TOPAMAX) 25 MG tablet Take 3 tablets (75 mg) by mouth 2 times daily 180 tablet 11       Problem List:  Patient Active Problem List    Diagnosis Date Noted     Morbid obesity (H) 03/03/2021     Priority: Medium     Mild intermittent asthma 11/18/2013     Priority: Medium     Chondromalacia of patella 12/10/2012     Priority: Medium     Hx gestational diabetes 04/16/2012     Priority: Medium     Pseudotumor cerebri - resolved 06/07/2011     Priority: Medium     IUD (intrauterine device) in place, paragard 11/2010 05/10/2011     Priority: Medium     Placed fall 2010       S/P LASIK SURGERY 2006 01/13/2011     Priority: Medium     CARDIOVASCULAR SCREENING; LDL GOAL LESS THAN 160 10/31/2010     Priority: Medium     Acne 10/06/2010      Priority: Medium     Mild major depression (H) 2010     Priority: Medium     Anxiety 2010     Priority: Medium        Past Medical/Surgical History:  Past Medical History:   Diagnosis Date     Acne     ff Dermatologist     Chondromalacia of patella      Depression with anxiety     off antidepressants     IUD (intrauterine device) in place     sharan IUD in      Knee pain 2012     Morbid obesity (H)     on weight loss meds. ff Endo q 3 months     Obesity     ff Dermatologist     Reactive airway disease     on Albuterol as needed     Tobacco use     1 P/week     Past Surgical History:   Procedure Laterality Date     HC TOOTH EXTRACTION W/FORCEP  2006     LASIK         Social History:  Social History     Socioeconomic History     Marital status:      Spouse name: Aaron     Number of children: 2     Years of education: 18     Highest education level: Not on file   Occupational History     Occupation: Software tech     Employer: [employer]   Social Needs     Financial resource strain: Not on file     Food insecurity     Worry: Not on file     Inability: Not on file     Transportation needs     Medical: Not on file     Non-medical: Not on file   Tobacco Use     Smoking status: Current Some Day Smoker     Packs/day: 0.10     Types: Cigarettes     Last attempt to quit: 10/1/2009     Years since quittin.4     Smokeless tobacco: Never Used     Tobacco comment: Smoking household   Substance and Sexual Activity     Alcohol use: Yes     Comment: Occasional     Drug use: No     Sexual activity: Yes     Partners: Male     Birth control/protection: I.U.D.     Comment: paragard   Lifestyle     Physical activity     Days per week: Not on file     Minutes per session: Not on file     Stress: Not on file   Relationships     Social connections     Talks on phone: Not on file     Gets together: Not on file     Attends Sabianism service: Not on file     Active member of club or organization:  "Not on file     Attends meetings of clubs or organizations: Not on file     Relationship status: Not on file     Intimate partner violence     Fear of current or ex partner: Not on file     Emotionally abused: Not on file     Physically abused: Not on file     Forced sexual activity: Not on file   Other Topics Concern     Parent/sibling w/ CABG, MI or angioplasty before 65F 55M? Yes   Social History Narrative     Not on file       Family History:  Family History   Problem Relation Age of Onset     Cancer Maternal Grandmother         lung     Heart Disease Maternal Grandmother      Diabetes Maternal Grandmother      Breast Cancer Maternal Grandfather         mets     Cancer Paternal Grandmother      Obesity Mother      Hypertension Mother      Coronary Artery Disease Father         nonfatal MI @ 52     Alcohol/Drug Brother      Cerebrovascular Disease Brother      Alcohol/Drug Brother      Thyroid Disease No family hx of      Glaucoma No family hx of      Macular Degeneration No family hx of        Review of Systems:  A complete review of systems reviewed by me is negative with the exeption of what has been mentioned in the history of present illness.      Physical Examination:  Vitals: Ht 1.6 m (5' 3\")   Wt 115.7 kg (255 lb)   BMI 45.17 kg/m    BMI= Body mass index is 45.17 kg/m .         [place] Total Score 3/17/2021   Total score - [place] 16     GENERAL APPEARANCE: alert and no distress  EYES: Eyes grossly normal to inspection  RESP: breathing is non-labored  NEURO: mentation intact and speech normal  PSYCH: affect normal/bright      Last Comprehensive Metabolic Panel:  Sodium   Date Value Ref Range Status   03/03/2021 136 133 - 144 mmol/L Final     Potassium   Date Value Ref Range Status   03/03/2021 3.6 3.4 - 5.3 mmol/L Final     Chloride   Date Value Ref Range Status   03/03/2021 107 94 - 109 mmol/L Final     Carbon Dioxide   Date Value Ref Range Status   03/03/2021 25 20 - 32 mmol/L Final     Anion Gap "   Date Value Ref Range Status   03/03/2021 4 3 - 14 mmol/L Final     Glucose   Date Value Ref Range Status   03/03/2021 98 70 - 99 mg/dL Final     Comment:     Non Fasting     Urea Nitrogen   Date Value Ref Range Status   03/03/2021 9 7 - 30 mg/dL Final     Creatinine   Date Value Ref Range Status   03/03/2021 0.61 0.52 - 1.04 mg/dL Final     GFR Estimate   Date Value Ref Range Status   03/03/2021 >90 >60 mL/min/[1.73_m2] Final     Comment:     Non  GFR Calc  Starting 12/18/2018, serum creatinine based estimated GFR (eGFR) will be   calculated using the Chronic Kidney Disease Epidemiology Collaboration   (CKD-EPI) equation.       Calcium   Date Value Ref Range Status   03/03/2021 8.6 8.5 - 10.1 mg/dL Final     TSH   Date Value Ref Range Status   02/24/2016 1.25 0.40 - 4.00 mU/L Final     BP Readings from Last 6 Encounters:   03/17/21 (!) 151/97   03/03/21 (!) 150/98   12/16/19 (!) 142/83   06/03/19 (!) 149/91   12/24/18 (!) 142/91   01/29/18 152/90       Impression/Plan:    Probable obstructive sleep apnea withcoexisting hypoventilation based on BMI of 45.17 kg/m , loud snoring, witnessed apnea, difficulty maintaining sleep, non-refreshing sleep, excessive daytime sleepiness(ESS 16) and co-morbid HTN. The STOP-BANG score is 5/8. Recommend Polysomnogram (using 4% desaturation/Medicare/2012 AASM 1B scoring rules) with transcutaneous C02 monitoring and pre-study VBG to evaluate for obstructive sleep apnea, hypoventilation and hypoxemia.  Ambien if needed.  Patient is a poor candidate for Home Sleep Testing due to morbid obesity (BMI >45) and possible obesity hypoventilation.       Literature provided regarding sleep apnea.      She will follow up with me in approximately two weeks after her sleep study has been competed to review the results and discuss plan of care.       Polysomnography reviewed.  Obstructive sleep apnea reviewed.  Complications of untreated sleep apnea were reviewed.    Cyndee Barillas  CHRISTIANA    CC: Cuca Ho

## 2024-04-01 ENCOUNTER — TRANSFERRED RECORDS (OUTPATIENT)
Dept: MULTI SPECIALTY CLINIC | Facility: CLINIC | Age: 49
End: 2024-04-01

## 2024-04-01 LAB — RETINOPATHY: NORMAL

## 2024-05-29 ENCOUNTER — MYC REFILL (OUTPATIENT)
Dept: ENDOCRINOLOGY | Facility: CLINIC | Age: 49
End: 2024-05-29
Payer: COMMERCIAL

## 2024-05-29 DIAGNOSIS — E66.01 MORBID OBESITY (H): ICD-10-CM

## 2024-06-03 RX ORDER — PHENTERMINE HYDROCHLORIDE 30 MG/1
30 CAPSULE ORAL EVERY MORNING
Qty: 30 CAPSULE | Refills: 3 | Status: SHIPPED | OUTPATIENT
Start: 2024-06-03 | End: 2024-08-12

## 2024-07-03 ENCOUNTER — MYC REFILL (OUTPATIENT)
Dept: ENDOCRINOLOGY | Facility: CLINIC | Age: 49
End: 2024-07-03
Payer: COMMERCIAL

## 2024-07-03 DIAGNOSIS — E66.01 MORBID OBESITY (H): ICD-10-CM

## 2024-07-03 RX ORDER — PHENTERMINE HYDROCHLORIDE 30 MG/1
30 CAPSULE ORAL EVERY MORNING
Qty: 30 CAPSULE | Refills: 3 | Status: CANCELLED | OUTPATIENT
Start: 2024-07-03

## 2024-07-03 NOTE — TELEPHONE ENCOUNTER
Called and spoke with pharmacy in regards to patient's Phentermine. Gave verbal authorization to fill 1 day early, as patient is going out of town. Rx was eligible for  tomorrow, 7/4/24, but the pharmacy will be closed and patient will be out of town. No further questions.

## 2024-07-19 ENCOUNTER — PATIENT OUTREACH (OUTPATIENT)
Dept: CARE COORDINATION | Facility: CLINIC | Age: 49
End: 2024-07-19
Payer: COMMERCIAL

## 2024-08-12 ENCOUNTER — VIRTUAL VISIT (OUTPATIENT)
Dept: ENDOCRINOLOGY | Facility: CLINIC | Age: 49
End: 2024-08-12
Payer: COMMERCIAL

## 2024-08-12 VITALS — BODY MASS INDEX: 47.31 KG/M2 | HEIGHT: 63 IN

## 2024-08-12 DIAGNOSIS — E66.01 MORBID OBESITY (H): ICD-10-CM

## 2024-08-12 PROCEDURE — 99213 OFFICE O/P EST LOW 20 MIN: CPT | Mod: 95 | Performed by: INTERNAL MEDICINE

## 2024-08-12 RX ORDER — PHENTERMINE HYDROCHLORIDE 30 MG/1
30 CAPSULE ORAL EVERY MORNING
Qty: 30 CAPSULE | Refills: 5 | Status: SHIPPED | OUTPATIENT
Start: 2024-08-12

## 2024-08-12 ASSESSMENT — PAIN SCALES - GENERAL: PAINLEVEL: NO PAIN (0)

## 2024-08-12 NOTE — LETTER
"2024       RE: Stephanie Mccarthy  [address] [facility] [address]     Dear Colleague,    Thank you for referring your patient, Stephanie Mccarthy, to the [facility] WEIGHT MANAGEMENT CLINIC [place] at [facility]. Please see a copy of my visit note below.    Virtual Visit Details    Joined the call at 2024, 8:08:54 am.  Left the call at 2024, 8:14:15 am.    Originating Location (pt. Location): Home    Distant Location (provider location):  Off-site  Platform used for Video Visit: [facility] Medical Weight Management Note     Stephanie Mccarthy  MRN:  [id number]  :  1975  YAHAIRA:  24    Dear [facility],    I had the pleasure of seeing your patient Stephanie Mccarthy.  She is a 42 year old female who is being seen for treatment of obesity.    CURRENT WEIGHT:   0 lbs 0 oz 248 reported    Wt Readings from Last 4 Encounters:   24 121.1 kg (267 lb)   23 121.1 kg (267 lb)   10/25/23 118.5 kg (261 lb 4.8 oz)   10/17/23 115.9 kg (255 lb 9.6 oz)     Height:  5' 2.992\"  Body Mass Index:  Body mass index is 47.31 kg/m .  Vitals:  None at this visit    Initial consult weight was 264 lbs on .  Weight change since last visit 24 is down 19.   Total gain is 16 pounds.    INTERVAL HISTORY:  Tried but did like or continue naltrexone. Has maintained phentermine and topiramate although had hiatus when had GI illness.         No data to display                MEDICATIONS:   Current Outpatient Medications   Medication Sig Dispense Refill     buPROPion (ZYBAN) 150 MG 12 hr tablet Take 1 tablet (150 mg) by mouth 2 times daily 180 tablet 1     losartan (COZAAR) 25 MG tablet Take 1 tablet (25 mg) by mouth daily 90 tablet 3     phentermine 30 MG capsule Take 1 capsule (30 mg) by mouth every morning 30 capsule 3     topiramate (TOPAMAX) 50 MG tablet Take 1 tablet (50 mg) by mouth 2 times daily 180 tablet 1 " "    naltrexone (DEPADE/REVIA) 50 MG tablet Take 1/2 tablet once daily 1-2 hours prior to worst cravings for 1 week, then increase to 1 tablet daily as directed if tolerating (Patient not taking: Reported on 10/25/2023) 30 tablet 3     tirzepatide-Weight Management (ZEPBOUND) 2.5 MG/0.5ML prefilled pen Inject 0.5 mLs (2.5 mg) Subcutaneous every 7 days 3 mL 11     No current facility-administered medications for this visit.         8/12/2024     7:47 AM   Weight Loss Medication History Reviewed With Patient   Which weight loss medications are you currently taking on a regular basis? Phentermine    Topamax (topiramate)   Are you having any side effects from the weight loss medication that we have prescribed you? No     Ht 1.6 m (5' 2.99\")   BMI 47.31 kg/m      ASSESSMENT:   Re-focus on food plan with focus on no between meal snacking, aggressive lowering of starches and cheese. Maintain phentermine 30 mg (AM) and topiramate to 50 mg (AM) and 50 mg at 2:00 pm.     FOLLOW-UP:    3 months    Rober Nam MD       Again, thank you for allowing me to participate in the care of your patient.      Sincerely,    Rober Nam MD    "

## 2024-08-12 NOTE — PROGRESS NOTES
Virtual Visit Details    Joined the call at 8/12/2024, 8:08:54 am.  Left the call at 8/12/2024, 8:14:15 am.    Originating Location (pt. Location): Home    Distant Location (provider location):  Off-site  Platform used for Video Visit: Zachary

## 2024-08-12 NOTE — PROGRESS NOTES
"    Return Medical Weight Management Note     Stephanie Mccarthy  MRN:  [id number]  :  1975  YAHAIRA:  24    Dear [facility],    I had the pleasure of seeing your patient Stephanie Mccarthy.  She is a 42 year old female who is being seen for treatment of obesity.    CURRENT WEIGHT:   0 lbs 0 oz 248 reported    Wt Readings from Last 4 Encounters:   24 121.1 kg (267 lb)   23 121.1 kg (267 lb)   10/25/23 118.5 kg (261 lb 4.8 oz)   10/17/23 115.9 kg (255 lb 9.6 oz)     Height:  5' 2.992\"  Body Mass Index:  Body mass index is 47.31 kg/m .  Vitals:  None at this visit    Initial consult weight was 264 lbs on .  Weight change since last visit 24 is down 19.   Total gain is 16 pounds.    INTERVAL HISTORY:  Tried but did like or continue naltrexone. Has maintained phentermine and topiramate although had hiatus when had GI illness.         No data to display                MEDICATIONS:   Current Outpatient Medications   Medication Sig Dispense Refill    buPROPion (ZYBAN) 150 MG 12 hr tablet Take 1 tablet (150 mg) by mouth 2 times daily 180 tablet 1    losartan (COZAAR) 25 MG tablet Take 1 tablet (25 mg) by mouth daily 90 tablet 3    phentermine 30 MG capsule Take 1 capsule (30 mg) by mouth every morning 30 capsule 3    topiramate (TOPAMAX) 50 MG tablet Take 1 tablet (50 mg) by mouth 2 times daily 180 tablet 1    naltrexone (DEPADE/REVIA) 50 MG tablet Take 1/2 tablet once daily 1-2 hours prior to worst cravings for 1 week, then increase to 1 tablet daily as directed if tolerating (Patient not taking: Reported on 10/25/2023) 30 tablet 3    tirzepatide-Weight Management (ZEPBOUND) 2.5 MG/0.5ML prefilled pen Inject 0.5 mLs (2.5 mg) Subcutaneous every 7 days 3 mL 11     No current facility-administered medications for this visit.         2024     7:47 AM   Weight Loss Medication History Reviewed With Patient   Which weight loss medications are you currently taking on a regular basis? " "Phentermine    Topamax (topiramate)   Are you having any side effects from the weight loss medication that we have prescribed you? No     Ht 1.6 m (5' 2.99\")   BMI 47.31 kg/m      ASSESSMENT:   Re-focus on food plan with focus on no between meal snacking, aggressive lowering of starches and cheese. Maintain phentermine 30 mg (AM) and topiramate to 50 mg (AM) and 50 mg at 2:00 pm.     FOLLOW-UP:    3 months    Rober Nam MD   "

## 2024-08-12 NOTE — NURSING NOTE
Current patient location: [address] [place] [address]  [place] [address]    Is the patient currently in the state of [place]? YES    Visit mode:VIDEO    If the visit is dropped, the patient can be reconnected by: VIDEO VISIT: Send to e-mail at: [email or web address]    Will anyone else be joining the visit? NO  (If patient encounters technical issues they should call [phone]    How would you like to obtain your AVS? MyChart    Are changes needed to the allergy or medication list? No    Are refills needed on medications prescribed by this physician? YES    Reason for visit: RECHECK    [place] PIERRE

## 2024-08-15 ENCOUNTER — TELEPHONE (OUTPATIENT)
Dept: ENDOCRINOLOGY | Facility: CLINIC | Age: 49
End: 2024-08-15
Payer: COMMERCIAL

## 2024-08-15 NOTE — TELEPHONE ENCOUNTER
Patient confirmed scheduled appointment:  Date: 2/24/25  Time: 8am  Visit type: Return MWM  Provider: Dr Nam  Location: [facility]

## 2024-08-16 ENCOUNTER — PATIENT OUTREACH (OUTPATIENT)
Dept: CARE COORDINATION | Facility: CLINIC | Age: 49
End: 2024-08-16
Payer: COMMERCIAL

## 2024-09-02 DIAGNOSIS — E66.01 MORBID OBESITY (H): ICD-10-CM

## 2024-09-05 RX ORDER — TOPIRAMATE 50 MG/1
50 TABLET, FILM COATED ORAL 2 TIMES DAILY
Qty: 180 TABLET | Refills: 1 | Status: SHIPPED | OUTPATIENT
Start: 2024-09-05

## 2024-09-05 NOTE — TELEPHONE ENCOUNTER
TOPIRAMATE 50MG TABLETS    Last Written Prescription Date:  2/9/24  Last Fill Quantity: 180,   # refills: 1  Last Office Visit : 8/12/24  Future Office visit:  2/24/25 Viv     Refilled per protocol.

## 2024-09-16 DIAGNOSIS — F17.200 NICOTINE DEPENDENCE, UNCOMPLICATED, UNSPECIFIED NICOTINE PRODUCT TYPE: ICD-10-CM

## 2024-09-16 RX ORDER — BUPROPION HYDROCHLORIDE 150 MG/1
150 TABLET, FILM COATED, EXTENDED RELEASE ORAL 2 TIMES DAILY
Qty: 180 TABLET | Refills: 0 | Status: SHIPPED | OUTPATIENT
Start: 2024-09-16

## 2024-11-12 ENCOUNTER — PATIENT OUTREACH (OUTPATIENT)
Dept: CARE COORDINATION | Facility: CLINIC | Age: 49
End: 2024-11-12
Payer: COMMERCIAL

## 2024-11-26 ENCOUNTER — PATIENT OUTREACH (OUTPATIENT)
Dept: CARE COORDINATION | Facility: CLINIC | Age: 49
End: 2024-11-26
Payer: COMMERCIAL

## 2024-12-21 ENCOUNTER — HEALTH MAINTENANCE LETTER (OUTPATIENT)
Age: 49
End: 2024-12-21

## 2025-01-25 ENCOUNTER — HEALTH MAINTENANCE LETTER (OUTPATIENT)
Age: 50
End: 2025-01-25

## 2025-02-05 ENCOUNTER — DOCUMENTATION ONLY (OUTPATIENT)
Dept: FAMILY MEDICINE | Facility: CLINIC | Age: 50
End: 2025-02-05
Payer: COMMERCIAL

## 2025-02-05 DIAGNOSIS — Z00.00 ROUTINE GENERAL MEDICAL EXAMINATION AT A HEALTH CARE FACILITY: Primary | ICD-10-CM

## 2025-02-05 DIAGNOSIS — Z13.220 LIPID SCREENING: ICD-10-CM

## 2025-02-05 DIAGNOSIS — R80.9 MICROALBUMINURIA: ICD-10-CM

## 2025-02-05 DIAGNOSIS — Z13.1 SCREENING FOR DIABETES MELLITUS: ICD-10-CM

## 2025-02-05 DIAGNOSIS — I10 HYPERTENSION WITH GOAL BLOOD PRESSURE LESS THAN 140/90: ICD-10-CM

## 2025-02-05 NOTE — PROGRESS NOTES
Patient has upcoming lab only appointment for PVL, please review and place future orders that may needed. If the lab is not needed, please let your care team follow up with patient.      Thank you  Deo case

## 2025-02-12 ENCOUNTER — LAB (OUTPATIENT)
Dept: LAB | Facility: CLINIC | Age: 50
End: 2025-02-12
Payer: COMMERCIAL

## 2025-02-12 DIAGNOSIS — Z00.00 ROUTINE GENERAL MEDICAL EXAMINATION AT A HEALTH CARE FACILITY: ICD-10-CM

## 2025-02-12 DIAGNOSIS — Z13.220 LIPID SCREENING: ICD-10-CM

## 2025-02-12 DIAGNOSIS — Z13.1 SCREENING FOR DIABETES MELLITUS: ICD-10-CM

## 2025-02-12 DIAGNOSIS — R80.9 MICROALBUMINURIA: ICD-10-CM

## 2025-02-12 DIAGNOSIS — I10 HYPERTENSION WITH GOAL BLOOD PRESSURE LESS THAN 140/90: ICD-10-CM

## 2025-02-12 LAB
ERYTHROCYTE [DISTWIDTH] IN BLOOD BY AUTOMATED COUNT: 12.8 % (ref 10–15)
EST. AVERAGE GLUCOSE BLD GHB EST-MCNC: 143 MG/DL
HBA1C MFR BLD: 6.6 % (ref 0–5.6)
HCT VFR BLD AUTO: 43.7 % (ref 35–47)
HGB BLD-MCNC: 14.4 G/DL (ref 11.7–15.7)
MCH RBC QN AUTO: 28.8 PG (ref 26.5–33)
MCHC RBC AUTO-ENTMCNC: 33 G/DL (ref 31.5–36.5)
MCV RBC AUTO: 87 FL (ref 78–100)
PLATELET # BLD AUTO: 346 10E3/UL (ref 150–450)
RBC # BLD AUTO: 5 10E6/UL (ref 3.8–5.2)
WBC # BLD AUTO: 11.9 10E3/UL (ref 4–11)

## 2025-02-12 PROCEDURE — 80061 LIPID PANEL: CPT

## 2025-02-12 PROCEDURE — 36415 COLL VENOUS BLD VENIPUNCTURE: CPT

## 2025-02-12 PROCEDURE — 85027 COMPLETE CBC AUTOMATED: CPT

## 2025-02-12 PROCEDURE — 80053 COMPREHEN METABOLIC PANEL: CPT

## 2025-02-12 PROCEDURE — 82570 ASSAY OF URINE CREATININE: CPT

## 2025-02-12 PROCEDURE — 83036 HEMOGLOBIN GLYCOSYLATED A1C: CPT

## 2025-02-12 PROCEDURE — 84443 ASSAY THYROID STIM HORMONE: CPT

## 2025-02-12 PROCEDURE — 82043 UR ALBUMIN QUANTITATIVE: CPT

## 2025-02-13 LAB
ALBUMIN SERPL BCG-MCNC: 4.3 G/DL (ref 3.5–5.2)
ALP SERPL-CCNC: 85 U/L (ref 40–150)
ALT SERPL W P-5'-P-CCNC: 35 U/L (ref 0–50)
ANION GAP SERPL CALCULATED.3IONS-SCNC: 13 MMOL/L (ref 7–15)
AST SERPL W P-5'-P-CCNC: 24 U/L (ref 0–45)
BILIRUB SERPL-MCNC: 0.5 MG/DL
BUN SERPL-MCNC: 9.1 MG/DL (ref 6–20)
CALCIUM SERPL-MCNC: 9.2 MG/DL (ref 8.8–10.4)
CHLORIDE SERPL-SCNC: 104 MMOL/L (ref 98–107)
CHOLEST SERPL-MCNC: 224 MG/DL
CREAT SERPL-MCNC: 0.61 MG/DL (ref 0.51–0.95)
CREAT UR-MCNC: 56.5 MG/DL
EGFRCR SERPLBLD CKD-EPI 2021: >90 ML/MIN/1.73M2
FASTING STATUS PATIENT QL REPORTED: YES
FASTING STATUS PATIENT QL REPORTED: YES
GLUCOSE SERPL-MCNC: 105 MG/DL (ref 70–99)
HCO3 SERPL-SCNC: 22 MMOL/L (ref 22–29)
HDLC SERPL-MCNC: 59 MG/DL
LDLC SERPL CALC-MCNC: 124 MG/DL
MICROALBUMIN UR-MCNC: <12 MG/L
MICROALBUMIN/CREAT UR: NORMAL MG/G{CREAT}
NONHDLC SERPL-MCNC: 165 MG/DL
POTASSIUM SERPL-SCNC: 3.9 MMOL/L (ref 3.4–5.3)
PROT SERPL-MCNC: 7.7 G/DL (ref 6.4–8.3)
SODIUM SERPL-SCNC: 139 MMOL/L (ref 135–145)
TRIGL SERPL-MCNC: 203 MG/DL
TSH SERPL DL<=0.005 MIU/L-ACNC: 1.01 UIU/ML (ref 0.3–4.2)

## 2025-02-24 ENCOUNTER — VIRTUAL VISIT (OUTPATIENT)
Dept: ENDOCRINOLOGY | Facility: CLINIC | Age: 50
End: 2025-02-24
Attending: INTERNAL MEDICINE
Payer: COMMERCIAL

## 2025-02-24 VITALS — HEIGHT: 63 IN | WEIGHT: 275 LBS | BODY MASS INDEX: 48.73 KG/M2

## 2025-02-24 DIAGNOSIS — E66.01 MORBID OBESITY (H): ICD-10-CM

## 2025-02-24 PROCEDURE — 98005 SYNCH AUDIO-VIDEO EST LOW 20: CPT | Performed by: INTERNAL MEDICINE

## 2025-02-24 RX ORDER — TOPIRAMATE 25 MG/1
75 TABLET, FILM COATED ORAL 2 TIMES DAILY
Qty: 360 TABLET | Refills: 5 | Status: SHIPPED | OUTPATIENT
Start: 2025-02-24

## 2025-02-24 ASSESSMENT — PAIN SCALES - GENERAL: PAINLEVEL_OUTOF10: NO PAIN (0)

## 2025-02-24 NOTE — LETTER
"2025       RE: Stephanie Mccarthy  [address] [facility]  [place] [address]     Dear Colleague,    Thank you for referring your patient, Stephanie Mccarthy, to the [facility] [place] at [facility]. Please see a copy of my visit note below.    Virtual Visit Details    oined the call at 2025, 8:12:23 am.  Left the call at 2025, 8:19:20 am    Originating Location (pt. Location): Home    Distant Location (provider location):  Off-site  Platform used for Video Visit: [facility] Medical Weight Management Note     Stephanie Mccarthy  MRN:  [id number]  :  1975  YAHAIRA:  25    Dear [facility],    I had the pleasure of seeing your patient Stephanie Mccarthy.  She is a 42 year old female who is being seen for treatment of obesity.    CURRENT WEIGHT:   275 lbs 0 oz 248 reported    Wt Readings from Last 4 Encounters:   25 124.7 kg (275 lb)   24 121.1 kg (267 lb)   23 121.1 kg (267 lb)   10/25/23 118.5 kg (261 lb 4.8 oz)     Height:  5' 3\"  Body Mass Index:  Body mass index is 48.71 kg/m .  Vitals:  None at this visit    Initial consult weight was 264 lbs on .  Weight change since last visit 24 is down 13.   Total gain is 11 pounds.    INTERVAL HISTORY:  Has maintained phentermine and topiramate but eating more due to life change..         No data to display                MEDICATIONS:   Current Outpatient Medications   Medication Sig Dispense Refill     buPROPion (ZYBAN) 150 MG 12 hr tablet TAKE 1 TABLET(150 MG) BY MOUTH TWICE DAILY 180 tablet 0     losartan (COZAAR) 25 MG tablet Take 1 tablet (25 mg) by mouth daily 90 tablet 3     phentermine 30 MG capsule TAKE 1 CAPSULE(30 MG) BY MOUTH EVERY MORNING 30 capsule 5     topiramate (TOPAMAX) 50 MG tablet Take 1 tablet (50 mg) by mouth 2 times daily. 180 tablet 1     No current facility-administered medications for this visit.        " " 2/24/2025     7:42 AM   Weight Loss Medication History Reviewed With Patient   Which weight loss medications are you currently taking on a regular basis? Phentermine    Topamax (topiramate)   Are you having any side effects from the weight loss medication that we have prescribed you? No     Ht 1.6 m (5' 3\")   Wt 124.7 kg (275 lb)   BMI 48.71 kg/m      ASSESSMENT:   Re-focus on food plan with focus on no between meal snacking, aggressive lowering of starches and cheese. Maintain phentermine 30 mg (AM) and increase topiramate to 75 mg (AM) and 75 mg at 2:00 pm.     FOLLOW-UP:    3 months    Rober Nam MD       Again, thank you for allowing me to participate in the care of your patient.      Sincerely,    Rober Nam MD    "

## 2025-02-24 NOTE — PROGRESS NOTES
Virtual Visit Details    oined the call at 2/24/2025, 8:12:23 am.  Left the call at 2/24/2025, 8:19:20 am    Originating Location (pt. Location): Home    Distant Location (provider location):  Off-site  Platform used for Video Visit: [facility]

## 2025-02-24 NOTE — NURSING NOTE
Current patient location: [address] [place] [address]  [place] [address]    Is the patient currently in the state of [place]? YES    Visit mode: VIDEO    If the visit is dropped, the patient can be reconnected by:VIDEO VISIT: Text to cell phone:   Telephone Information:   Mobile [phone]       Will anyone else be joining the visit? NO  (If patient encounters technical issues they should call [phone]    Are changes needed to the allergy or medication list? No    Are refills needed on medications prescribed by this physician? Discuss with provider    Rooming Documentation:  Questionnaire(s) completed    Reason for visit: RECHECK    Mo PIERRE

## 2025-02-24 NOTE — PROGRESS NOTES
"    Return Medical Weight Management Note     Stephanie Mccarthy  MRN:  [id number]  :  1975  YAHAIRA:  25    Dear [facility],    I had the pleasure of seeing your patient Stephanie Mccarthy.  She is a 42 year old female who is being seen for treatment of obesity.    CURRENT WEIGHT:   275 lbs 0 oz 248 reported    Wt Readings from Last 4 Encounters:   25 124.7 kg (275 lb)   24 121.1 kg (267 lb)   23 121.1 kg (267 lb)   10/25/23 118.5 kg (261 lb 4.8 oz)     Height:  5' 3\"  Body Mass Index:  Body mass index is 48.71 kg/m .  Vitals:  None at this visit    Initial consult weight was 264 lbs on .  Weight change since last visit 24 is down 13.   Total gain is 11 pounds.    INTERVAL HISTORY:  Has maintained phentermine and topiramate but eating more due to life change..         No data to display                MEDICATIONS:   Current Outpatient Medications   Medication Sig Dispense Refill    buPROPion (ZYBAN) 150 MG 12 hr tablet TAKE 1 TABLET(150 MG) BY MOUTH TWICE DAILY 180 tablet 0    losartan (COZAAR) 25 MG tablet Take 1 tablet (25 mg) by mouth daily 90 tablet 3    phentermine 30 MG capsule TAKE 1 CAPSULE(30 MG) BY MOUTH EVERY MORNING 30 capsule 5    topiramate (TOPAMAX) 50 MG tablet Take 1 tablet (50 mg) by mouth 2 times daily. 180 tablet 1     No current facility-administered medications for this visit.         2025     7:42 AM   Weight Loss Medication History Reviewed With Patient   Which weight loss medications are you currently taking on a regular basis? Phentermine    Topamax (topiramate)   Are you having any side effects from the weight loss medication that we have prescribed you? No     Ht 1.6 m (5' 3\")   Wt 124.7 kg (275 lb)   BMI 48.71 kg/m      ASSESSMENT:   Re-focus on food plan with focus on no between meal snacking, aggressive lowering of starches and cheese. Maintain phentermine 30 mg (AM) and increase topiramate to 75 mg (AM) and 75 mg at 2:00 pm. "     FOLLOW-UP:    3 months    Rober Nam MD

## 2025-03-03 SDOH — HEALTH STABILITY: PHYSICAL HEALTH: ON AVERAGE, HOW MANY DAYS PER WEEK DO YOU ENGAGE IN MODERATE TO STRENUOUS EXERCISE (LIKE A BRISK WALK)?: 1 DAY

## 2025-03-03 SDOH — HEALTH STABILITY: PHYSICAL HEALTH: ON AVERAGE, HOW MANY MINUTES DO YOU ENGAGE IN EXERCISE AT THIS LEVEL?: 10 MIN

## 2025-03-03 ASSESSMENT — ASTHMA QUESTIONNAIRES
QUESTION_2 LAST FOUR WEEKS HOW OFTEN HAVE YOU HAD SHORTNESS OF BREATH: NOT AT ALL
QUESTION_4 LAST FOUR WEEKS HOW OFTEN HAVE YOU USED YOUR RESCUE INHALER OR NEBULIZER MEDICATION (SUCH AS ALBUTEROL): NOT AT ALL
ACT_TOTALSCORE: 25
ACT_TOTALSCORE: 25
QUESTION_5 LAST FOUR WEEKS HOW WOULD YOU RATE YOUR ASTHMA CONTROL: COMPLETELY CONTROLLED
QUESTION_3 LAST FOUR WEEKS HOW OFTEN DID YOUR ASTHMA SYMPTOMS (WHEEZING, COUGHING, SHORTNESS OF BREATH, CHEST TIGHTNESS OR PAIN) WAKE YOU UP AT NIGHT OR EARLIER THAN USUAL IN THE MORNING: NOT AT ALL
QUESTION_1 LAST FOUR WEEKS HOW MUCH OF THE TIME DID YOUR ASTHMA KEEP YOU FROM GETTING AS MUCH DONE AT WORK, SCHOOL OR AT HOME: NONE OF THE TIME

## 2025-03-03 ASSESSMENT — PATIENT HEALTH QUESTIONNAIRE - PHQ9
SUM OF ALL RESPONSES TO PHQ QUESTIONS 1-9: 4
SUM OF ALL RESPONSES TO PHQ QUESTIONS 1-9: 4
10. IF YOU CHECKED OFF ANY PROBLEMS, HOW DIFFICULT HAVE THESE PROBLEMS MADE IT FOR YOU TO DO YOUR WORK, TAKE CARE OF THINGS AT HOME, OR GET ALONG WITH OTHER PEOPLE: NOT DIFFICULT AT ALL

## 2025-03-03 ASSESSMENT — SOCIAL DETERMINANTS OF HEALTH (SDOH): HOW OFTEN DO YOU GET TOGETHER WITH FRIENDS OR RELATIVES?: ONCE A WEEK

## 2025-03-04 ENCOUNTER — OFFICE VISIT (OUTPATIENT)
Dept: FAMILY MEDICINE | Facility: CLINIC | Age: 50
End: 2025-03-04
Attending: FAMILY MEDICINE
Payer: COMMERCIAL

## 2025-03-04 VITALS
HEIGHT: 63 IN | TEMPERATURE: 97.3 F | SYSTOLIC BLOOD PRESSURE: 130 MMHG | HEART RATE: 100 BPM | WEIGHT: 278.2 LBS | DIASTOLIC BLOOD PRESSURE: 72 MMHG | OXYGEN SATURATION: 95 % | RESPIRATION RATE: 18 BRPM | BODY MASS INDEX: 49.29 KG/M2

## 2025-03-04 DIAGNOSIS — Z12.11 SCREEN FOR COLON CANCER: ICD-10-CM

## 2025-03-04 DIAGNOSIS — Z00.00 ROUTINE GENERAL MEDICAL EXAMINATION AT A HEALTH CARE FACILITY: Primary | ICD-10-CM

## 2025-03-04 DIAGNOSIS — Z23 NEED FOR VACCINATION: ICD-10-CM

## 2025-03-04 DIAGNOSIS — E11.65 TYPE 2 DIABETES MELLITUS WITH HYPERGLYCEMIA, WITHOUT LONG-TERM CURRENT USE OF INSULIN (H): ICD-10-CM

## 2025-03-04 DIAGNOSIS — E66.01 CLASS 3 SEVERE OBESITY DUE TO EXCESS CALORIES WITH SERIOUS COMORBIDITY AND BODY MASS INDEX (BMI) OF 45.0 TO 49.9 IN ADULT (H): ICD-10-CM

## 2025-03-04 DIAGNOSIS — I10 HYPERTENSION WITH GOAL BLOOD PRESSURE LESS THAN 140/90: ICD-10-CM

## 2025-03-04 DIAGNOSIS — E66.813 CLASS 3 SEVERE OBESITY DUE TO EXCESS CALORIES WITH SERIOUS COMORBIDITY AND BODY MASS INDEX (BMI) OF 45.0 TO 49.9 IN ADULT (H): ICD-10-CM

## 2025-03-04 DIAGNOSIS — E78.2 MIXED HYPERLIPIDEMIA: ICD-10-CM

## 2025-03-04 PROCEDURE — 90480 ADMN SARSCOV2 VAC 1/ONLY CMP: CPT | Performed by: FAMILY MEDICINE

## 2025-03-04 PROCEDURE — 91320 SARSCV2 VAC 30MCG TRS-SUC IM: CPT | Performed by: FAMILY MEDICINE

## 2025-03-04 PROCEDURE — 99214 OFFICE O/P EST MOD 30 MIN: CPT | Mod: 25 | Performed by: FAMILY MEDICINE

## 2025-03-04 PROCEDURE — 99396 PREV VISIT EST AGE 40-64: CPT | Performed by: FAMILY MEDICINE

## 2025-03-04 RX ORDER — LOSARTAN POTASSIUM 25 MG/1
25 TABLET ORAL DAILY
Qty: 90 TABLET | Refills: 3 | Status: SHIPPED | OUTPATIENT
Start: 2025-03-04

## 2025-03-04 RX ORDER — ATORVASTATIN CALCIUM 20 MG/1
20 TABLET, FILM COATED ORAL DAILY
Qty: 90 TABLET | Refills: 3 | Status: SHIPPED | OUTPATIENT
Start: 2025-03-04

## 2025-03-04 ASSESSMENT — ANXIETY QUESTIONNAIRES
GAD7 TOTAL SCORE: 1
1. FEELING NERVOUS, ANXIOUS, OR ON EDGE: NOT AT ALL
GAD7 TOTAL SCORE: 1
5. BEING SO RESTLESS THAT IT IS HARD TO SIT STILL: NOT AT ALL
7. FEELING AFRAID AS IF SOMETHING AWFUL MIGHT HAPPEN: NOT AT ALL
2. NOT BEING ABLE TO STOP OR CONTROL WORRYING: NOT AT ALL
3. WORRYING TOO MUCH ABOUT DIFFERENT THINGS: NOT AT ALL
6. BECOMING EASILY ANNOYED OR IRRITABLE: SEVERAL DAYS

## 2025-03-04 ASSESSMENT — PATIENT HEALTH QUESTIONNAIRE - PHQ9: 5. POOR APPETITE OR OVEREATING: NOT AT ALL

## 2025-03-04 NOTE — PATIENT INSTRUCTIONS
Patient Education   Preventive Care Advice   This is general advice given by our system to help you stay healthy. However, your care team may have specific advice just for you. Please talk to your care team about your preventive care needs.  Nutrition  Eat 5 or more servings of fruits and vegetables each day.  Try wheat bread, brown rice and whole grain pasta (instead of white bread, rice, and pasta).  Get enough calcium and vitamin D. Check the label on foods and aim for 100% of the RDA (recommended daily allowance).  Lifestyle  Exercise at least 150 minutes each week  (30 minutes a day, 5 days a week).  Do muscle strengthening activities 2 days a week. These help control your weight and prevent disease.  No smoking.  Wear sunscreen to prevent skin cancer.  Have a dental exam and cleaning every 6 months.  Yearly exams  See your health care team every year to talk about:  Any changes in your health.  Any medicines your care team has prescribed.  Preventive care, family planning, and ways to prevent chronic diseases.  Shots (vaccines)   HPV shots (up to age 26), if you've never had them before.  Hepatitis B shots (up to age 59), if you've never had them before.  COVID-19 shot: Get this shot when it's due.  Flu shot: Get a flu shot every year.  Tetanus shot: Get a tetanus shot every 10 years.  Pneumococcal, hepatitis A, and RSV shots: Ask your care team if you need these based on your risk.  Shingles shot (for age 50 and up)  General health tests  Diabetes screening:  Starting at age 35, Get screened for diabetes at least every 3 years.  If you are younger than age 35, ask your care team if you should be screened for diabetes.  Cholesterol test: At age 39, start having a cholesterol test every 5 years, or more often if advised.  Bone density scan (DEXA): At age 50, ask your care team if you should have this scan for osteoporosis (brittle bones).  Hepatitis C: Get tested at least once in your life.  STIs (sexually  transmitted infections)  Before age 24: Ask your care team if you should be screened for STIs.  After age 24: Get screened for STIs if you're at risk. You are at risk for STIs (including HIV) if:  You are sexually active with more than one person.  You don't use condoms every time.  You or a partner was diagnosed with a sexually transmitted infection.  If you are at risk for HIV, ask about PrEP medicine to prevent HIV.  Get tested for HIV at least once in your life, whether you are at risk for HIV or not.  Cancer screening tests  Cervical cancer screening: If you have a cervix, begin getting regular cervical cancer screening tests starting at age 21.  Breast cancer scan (mammogram): If you've ever had breasts, begin having regular mammograms starting at age 40. This is a scan to check for breast cancer.  Colon cancer screening: It is important to start screening for colon cancer at age 45.  Have a colonoscopy test every 10 years (or more often if you're at risk) Or, ask your provider about stool tests like a FIT test every year or Cologuard test every 3 years.  To learn more about your testing options, visit:   .  For help making a decision, visit:   https://bit.ly/ud69538.  Prostate cancer screening test: If you have a prostate, ask your care team if a prostate cancer screening test (PSA) at age 55 is right for you.  Lung cancer screening: If you are a current or former smoker ages 50 to 80, ask your care team if ongoing lung cancer screenings are right for you.  For informational purposes only. Not to replace the advice of your health care provider. Copyright   2023 [employer]. All rights reserved. Clinically reviewed by the [facility] Transitions Program. [employer] 099154 - REV 01/24.

## 2025-03-04 NOTE — PROGRESS NOTES
Preventive Care Visit  [facility] JENN Ho MD, Family Medicine  Mar 4, 2025      Assessment & Plan     Renaet was seen today for physical.    Diagnoses and all orders for this visit:    Routine general medical examination at a health care facility  -     PRIMARY CARE FOLLOW-UP SCHEDULING  -     REVIEW OF HEALTH MAINTENANCE PROTOCOL ORDERS  -     PRIMARY CARE FOLLOW-UP SCHEDULING; Future    Screen for colon cancer  -     COLOGUARD([employer]); Future    Hypertension with goal blood pressure less than 140/90, controlled  -    Refill:  losartan (COZAAR) 25 MG tablet; Take 1 tablet (25 mg) by mouth daily.    Mixed hyperlipidemia        -     The 10-year ASCVD risk score (Lexie DK, et al., 2019) is: 9.9%  -     Start: atorvastatin (LIPITOR) 20 MG tablet; Take 1 tablet (20 mg) by mouth daily.    Type 2 diabetes mellitus with hyperglycemia, without long-term current use of insulin (H), newly diagnosed with an A1c of 6.6        -     Diabetes education and patient questions on diabetes answered. Discussed treatment options. Recommended a GLP-1a and Medical Nutrition Therapy (MNT). Patient was agreeable.   -     Start: tirzepatide-Weight Management (ZEPBOUND) 2.5 MG/0.5ML prefilled pen; Inject 0.5 mLs (2.5 mg) subcutaneously every 7 days.  -     Adult Diabetes Education  Referral; Future    Class 3 severe obesity due to excess calories with serious comorbidity and body mass index (BMI) of 45.0 to 49.9 in adult (H)  -     Start: tirzepatide-Weight Management (ZEPBOUND) 2.5 MG/0.5ML prefilled pen; Inject 0.5 mLs (2.5 mg) subcutaneously every 7 days.  -     Weight management plan: Discussed healthy diet and exercise guidelines      Need for vaccination  -     INFLUENZA VACCINE TRIVALENT(FLUBLOK)  -     COVID-19 12+ (PFIZER)      Patient education and Handout with home care instructions given. See AVS for details.      Patient has been advised of split billing requirements and  "indicates understanding: Yes        Nicotine/Tobacco Cessation  She reports that she has been smoking cigarettes. She has a 3 pack-year smoking history. She has been exposed to tobacco smoke. She has never used smokeless tobacco.  Nicotine/Tobacco Cessation Plan  Self help information given to patient      BMI  Estimated body mass index is 49.92 kg/m  as calculated from the following:    Height as of this encounter: 1.59 m (5' 2.6\").    Weight as of this encounter: 126.2 kg (278 lb 3.2 oz).   Weight management plan: Discussed healthy diet and exercise guidelines Following with Endocrinology    Counseling  Appropriate preventive services were addressed with this patient via screening, questionnaire, or discussion as appropriate for fall prevention, nutrition, physical activity, Tobacco-use cessation, social engagement, weight loss and cognition.  Checklist reviewing preventive services available has been given to the patient.  Reviewed patient's diet, addressing concerns and/or questions.   She is at risk for lack of exercise and has been provided with information to increase physical activity for the benefit of her well-being.       Return in about 3 months (around 6/4/2025) for Diabetes Follow Up with a HgbA1C prior to visit.      Audie Dewitt is a 50 year old, presenting for the following:  Physical        3/4/2025     7:52 AM   Additional Questions   Roomed by Poonam   Accompanied by self        Via the Health Maintenance questionnaire, the patient has reported the following services have been completed , this information has been sent to the abstraction team.  Via the Health Maintenance questionnaire, the patient has reported the following services have been completed -Mammogram:  [facility]  [place] 2023-12-01, this information has not been sent to the abstraction team.    KANDIS Dewitt is a pleasant 50 year old female/male patient of mine here for her/his Annual Physical and chronic disease management. "     HYPERTENSION - Patient has longstanding history of HTN , currently denies any symptoms referable to elevated blood pressure. Specifically denies chest pain, palpitations, dyspnea, orthopnea, PND or peripheral edema. Blood pressure readings have been in normal range. Current medication regimen is as listed below. Patient denies any side effects of medication- Losartan 25 mg/day.   Wt Readings from Last 4 Encounters:   03/04/25 126.2 kg (278 lb 3.2 oz)   02/24/25 124.7 kg (275 lb)   02/05/24 121.1 kg (267 lb)   12/12/23 121.1 kg (267 lb)       Weight loss journey  Following with Endocrinology for weight loss. On Topamax and Phentermine.  Hasn't lost much weight despite increasing dose.     Wt Readings from Last 4 Encounters:   03/04/25 126.2 kg (278 lb 3.2 oz)   02/24/25 124.7 kg (275 lb)   02/05/24 121.1 kg (267 lb)   12/12/23 121.1 kg (267 lb)     Recent labs revealed new onset diabetes and uncontrolled hyperlipidemia.     Component      Latest Ref Rng 2/12/2025  4:01 PM   Estimated Average Glucose      <117 mg/dL 143 (H)    Hemoglobin A1C      0.0 - 5.6 % 6.6 (H)       Recent lipid panel-  Recent Labs   Lab Test 02/12/25  1601 12/16/22  0745   CHOL 224* 220*   HDL 59 54   * 125*   TRIG 203* 203*     The 10-year ASCVD risk score (Lexie OLIVAREZ, et al., 2019) is: 9.9%    Values used to calculate the score:      Age: 50 years      Sex: Female      Is Non- : No      Diabetic: Yes      Tobacco smoker: Yes      Systolic Blood Pressure: 130 mmHg      Is BP treated: Yes      HDL Cholesterol: 59 mg/dL      Total Cholesterol: 224 mg/dL    HEALTH CARE MAINTENANCE: Due to colon cancer screening.     Advance Care Planning  Patient does not have a Health Care Directive: Discussed advance care planning with patient; information given to patient to review.      3/3/2025   General Health   How would you rate your overall physical health? (!) FAIR   Feel stress (tense, anxious, or unable to sleep) Only  a little   (!) STRESS CONCERN      3/3/2025   Nutrition   Three or more servings of calcium each day? (!) NO   Diet: Breakfast skipped   How many servings of fruit and vegetables per day? (!) 0-1   How many sweetened beverages each day? 0-1         3/3/2025   Exercise   Days per week of moderate/strenous exercise 1 day   Average minutes spent exercising at this level 10 min   (!) EXERCISE CONCERN      3/3/2025   Social Factors   Frequency of gathering with friends or relatives Once a week   Worry food won't last until get money to buy more No   Food not last or not have enough money for food? No   Do you have housing? (Housing is defined as stable permanent housing and does not include staying ouside in a car, in a tent, in an abandoned building, in an overnight shelter, or couch-surfing.) No   Are you worried about losing your housing? No   Lack of transportation? No   Unable to get utilities (heat,electricity)? No   Want help with housing or utility concern? No   (!) HOUSING CONCERN PRESENT      3/3/2025   Fall Risk   Fallen 2 or more times in the past year? No   Trouble with walking or balance? No          3/3/2025   Dental   Dentist two times every year? Yes          Today's PHQ-9 Score:       3/3/2025    10:52 AM   PHQ-9 SCORE   PHQ-9 Total Score MyChart 4 (Minimal depression)   PHQ-9 Total Score 4        Patient-reported         3/3/2025   Substance Use   Alcohol more than 3/day or more than 7/wk No   Do you use any other substances recreationally? No     Social History     Tobacco Use    Smoking status: Some Days     Current packs/day: 0.00     Average packs/day: 0.1 packs/day for 30.0 years (3.0 ttl pk-yrs)     Types: Cigarettes     Last attempt to quit: 10/1/2009     Years since quitting: 15.4     Passive exposure: Current    Smokeless tobacco: Never    Tobacco comments:     Smoking household     Very rarely   Vaping Use    Vaping status: Never Used   Substance Use Topics    Alcohol use: Yes     Comment:  Occasional    Drug use: No           11/4/2022   LAST FHS-7 RESULTS   1st degree relative breast or ovarian cancer No    Any relative bilateral breast cancer No    Any male have breast cancer Yes    Any ONE woman have BOTH breast AND ovarian cancer No    Any woman with breast cancer before 50yrs No    2 or more relatives with breast AND/OR ovarian cancer No    2 or more relatives with breast AND/OR bowel cancer No        Proxy-reported     Mammogram Screening - Mammogram every 1-2 years updated in Health Maintenance based on mutual decision making        3/3/2025   STI Screening   New sexual partner(s) since last STI/HIV test? No     History of abnormal Pap smear: No - age 30-64 HPV with reflex Pap every 5 years recommended        Latest Ref Rng & Units 3/3/2021     1:50 PM 3/3/2021     1:27 PM 2/24/2016    12:00 AM   PAP / HPV   PAP (Historical)   NIL  NIL    HPV 16 DNA NEG^Negative Negative      HPV 18 DNA NEG^Negative Negative      Other HR HPV NEG^Negative Negative        ASCVD Risk   The 10-year ASCVD risk score (Lexie DK, et al., 2019) is: 9.9%    Values used to calculate the score:      Age: 50 years      Sex: Female      Is Non- : No      Diabetic: Yes      Tobacco smoker: Yes      Systolic Blood Pressure: 130 mmHg      Is BP treated: Yes      HDL Cholesterol: 59 mg/dL      Total Cholesterol: 224 mg/dL            3/3/2025   Contraception/Family Planning   Questions about contraception or family planning No     Reviewed and updated as needed this visit by Provider      Problems               Past Medical History:   Diagnosis Date    Chondromalacia of patella     Depression with anxiety     off antidepressants    Depressive disorder     Diabetes (H)     gestational diabetes    Essential hypertension     Knee pain 04/16/2012    Obesity     Pseudotumor cerebri - resolved 06/07/2011    Reactive airway disease     S/P LASIK SURGERY 2006 2006    Uncomplicated asthma     on set by  perfume     Past Surgical History:   Procedure Laterality Date    DENTAL SURGERY  01/01/2006    LASIK  01/01/2006     Lab work is in process  Labs reviewed in EPIC  BP Readings from Last 3 Encounters:   03/04/25 130/72   12/12/23 126/86   10/25/23 117/79    Wt Readings from Last 3 Encounters:   03/04/25 126.2 kg (278 lb 3.2 oz)   02/24/25 124.7 kg (275 lb)   02/05/24 121.1 kg (267 lb)                  Patient Active Problem List   Diagnosis    Mild major depression (H)    Anxiety    Acne    CARDIOVASCULAR SCREENING; LDL GOAL LESS THAN 160    IUD (intrauterine device) in place, paragard 11/2010    Hx gestational diabetes    Chondromalacia of patella    Mild intermittent asthma    Morbid obesity (H)    Essential hypertension    STELLA (obstructive sleep apnea)- severe (AHI 54)    Chronic kidney disease, stage 1     Past Surgical History:   Procedure Laterality Date    DENTAL SURGERY  01/01/2006    LASIK  01/01/2006       Social History     Tobacco Use    Smoking status: Some Days     Current packs/day: 0.00     Average packs/day: 0.1 packs/day for 30.0 years (3.0 ttl pk-yrs)     Types: Cigarettes     Last attempt to quit: 10/1/2009     Years since quitting: 15.4     Passive exposure: Current    Smokeless tobacco: Never    Tobacco comments:     Smoking household     Very rarely   Substance Use Topics    Alcohol use: Yes     Comment: Occasional     Family History   Problem Relation Age of Onset    Eye Surgery Mother         cataracts    Obesity Mother     Hypertension Mother     Coronary Artery Disease Father         nonfatal MI @ 52    Cancer Maternal Grandmother         lung    Heart Disease Maternal Grandmother     Diabetes Maternal Grandmother     Breast Cancer Maternal Grandfather         mets    Cancer Paternal Grandmother     Alcohol/Drug Brother     Cerebrovascular Disease Brother         bells palsy    Alcohol/Drug Brother     Thyroid Disease No family hx of     Glaucoma No family hx of     Macular Degeneration No  "family hx of          Current Outpatient Medications   Medication Sig Dispense Refill    atorvastatin (LIPITOR) 20 MG tablet Take 1 tablet (20 mg) by mouth daily. 90 tablet 3    buPROPion (ZYBAN) 150 MG 12 hr tablet TAKE 1 TABLET(150 MG) BY MOUTH TWICE DAILY 180 tablet 0    losartan (COZAAR) 25 MG tablet Take 1 tablet (25 mg) by mouth daily. 90 tablet 3    phentermine 30 MG capsule TAKE 1 CAPSULE(30 MG) BY MOUTH EVERY MORNING 30 capsule 5    tirzepatide-Weight Management (ZEPBOUND) 2.5 MG/0.5ML prefilled pen Inject 0.5 mLs (2.5 mg) subcutaneously every 7 days. 3 mL 0    topiramate (TOPAMAX) 25 MG tablet Take 3 tablets (75 mg) by mouth 2 times daily. 360 tablet 5     Allergies   Allergen Reactions    Doxycycline      Pseudotumor cerebri    Tetracycline Other (See Comments)     Pseudotumor cerebri.      Perfume Difficulty breathing         Review of Systems  Constitutional, HEENT, cardiovascular, pulmonary, gi and gu systems are negative, except as otherwise noted.     Objective    Exam  /72   Pulse 100   Temp 97.3  F (36.3  C) (Temporal)   Resp 18   Ht 1.59 m (5' 2.6\")   Wt 126.2 kg (278 lb 3.2 oz)   LMP  (LMP Unknown)   SpO2 95%   BMI 49.92 kg/m     Estimated body mass index is 49.92 kg/m  as calculated from the following:    Height as of this encounter: 1.59 m (5' 2.6\").    Weight as of this encounter: 126.2 kg (278 lb 3.2 oz).    Physical Exam  GENERAL: alert and no distress  EYES: Eyes grossly normal to inspection, PERRL and conjunctivae and sclerae normal  HENT: ear canals and TM's normal, nose and mouth without ulcers or lesions  NECK: no adenopathy, no asymmetry, masses, or scars  RESP: lungs clear to auscultation - no rales, rhonchi or wheezes  CV: regular rate and rhythm, normal S1 S2, no S3 or S4, no murmur, click or rub, no peripheral edema  ABDOMEN: soft, nontender, no hepatosplenomegaly, no masses and bowel sounds normal  MS: no gross musculoskeletal defects noted, no edema  SKIN: no " suspicious lesions or rashes  NEURO: Normal strength and tone, mentation intact and speech normal  PSYCH: mentation appears normal, affect normal/bright    DATA  Recent labs reviewed  Component      Latest Ref Rng 2/12/2025  4:01 PM   Sodium      135 - 145 mmol/L 139    Potassium      3.4 - 5.3 mmol/L 3.9    Carbon Dioxide (CO2)      22 - 29 mmol/L 22    Anion Gap      7 - 15 mmol/L 13    Urea Nitrogen      6.0 - 20.0 mg/dL 9.1    Creatinine      0.51 - 0.95 mg/dL 0.61    GFR Estimate      >60 mL/min/1.73m2 >90    Calcium      8.8 - 10.4 mg/dL 9.2    Chloride      98 - 107 mmol/L 104    Glucose      70 - 99 mg/dL 105 (H)    Alkaline Phosphatase      40 - 150 U/L 85    AST      0 - 45 U/L 24    ALT      0 - 50 U/L 35    Protein Total      6.4 - 8.3 g/dL 7.7    Albumin      3.5 - 5.2 g/dL 4.3    Bilirubin Total      <=1.2 mg/dL 0.5    Patient Fasting? Yes    Patient Fasting? Yes    WBC      4.0 - 11.0 10e3/uL 11.9 (H)    RBC Count      3.80 - 5.20 10e6/uL 5.00    Hemoglobin      11.7 - 15.7 g/dL 14.4    Hematocrit      35.0 - 47.0 % 43.7    MCV      78 - 100 fL 87    MCH      26.5 - 33.0 pg 28.8    MCHC      31.5 - 36.5 g/dL 33.0    RDW      10.0 - 15.0 % 12.8    Platelet Count      150 - 450 10e3/uL 346    Cholesterol      <200 mg/dL 224 (H)    Triglycerides      <150 mg/dL 203 (H)    HDL Cholesterol      >=50 mg/dL 59    LDL Cholesterol Calculated      <100 mg/dL 124 (H)    Non HDL Cholesterol      <130 mg/dL 165 (H)    Creatinine Urine      mg/dL 56.5    Albumin Urine mg/L      mg/L <12.0    Albumin Urine mg/g Cr --    Estimated Average Glucose      <117 mg/dL 143 (H)    Hemoglobin A1C      0.0 - 5.6 % 6.6 (H)    TSH      0.30 - 4.20 uIU/mL 1.01        Signed Electronically by: Cuca Ho MD    Answers submitted by the patient for this visit:  Patient Health Questionnaire (Submitted on 3/3/2025)  If you checked off any problems, how difficult have these problems made it for you to do your work, take care of  things at home, or get along with other people?: Not difficult at all  PHQ9 TOTAL SCORE: 4

## 2025-03-05 ENCOUNTER — ORDERS ONLY (AUTO-RELEASED) (OUTPATIENT)
Dept: FAMILY MEDICINE | Facility: CLINIC | Age: 50
End: 2025-03-05

## 2025-03-05 ENCOUNTER — ANCILLARY PROCEDURE (OUTPATIENT)
Dept: MAMMOGRAPHY | Facility: CLINIC | Age: 50
End: 2025-03-05
Attending: FAMILY MEDICINE
Payer: COMMERCIAL

## 2025-03-05 DIAGNOSIS — Z12.11 SCREEN FOR COLON CANCER: ICD-10-CM

## 2025-03-05 DIAGNOSIS — Z12.31 VISIT FOR SCREENING MAMMOGRAM: ICD-10-CM

## 2025-03-11 ENCOUNTER — TELEPHONE (OUTPATIENT)
Dept: ENDOCRINOLOGY | Facility: CLINIC | Age: 50
End: 2025-03-11
Payer: COMMERCIAL

## 2025-03-11 NOTE — TELEPHONE ENCOUNTER
Spoke with patient regarding scheduling in-person or virtual appointment. Patient was scheduled for next available and [facility]Per Patient

## 2025-03-12 ENCOUNTER — VIRTUAL VISIT (OUTPATIENT)
Dept: EDUCATION SERVICES | Facility: OTHER | Age: 50
End: 2025-03-12
Attending: FAMILY MEDICINE
Payer: COMMERCIAL

## 2025-03-12 DIAGNOSIS — E11.65 TYPE 2 DIABETES MELLITUS WITH HYPERGLYCEMIA, WITHOUT LONG-TERM CURRENT USE OF INSULIN (H): ICD-10-CM

## 2025-03-12 PROCEDURE — G0108 DIAB MANAGE TRN  PER INDIV: HCPCS | Mod: 95 | Performed by: PHARMACIST

## 2025-03-12 RX ORDER — LANCETS
EACH MISCELLANEOUS
Qty: 100 EACH | Refills: 6 | Status: SHIPPED | OUTPATIENT
Start: 2025-03-12

## 2025-03-12 NOTE — LETTER
3/12/2025         RE: Stephanie Mccarthy  [address] [facility]  [place] [address]        Dear Colleague,    Thank you for referring your patient, Stephanie Mccarthy, to the [facility]. Please see a copy of my visit note below.    Diabetes Self-Management Education & Support    Presents for: Initial Assessment for new diagnosis, DM2    Type of service:  Video Visit    If the video visit is dropped, the video visit invitation should be resent by: Text to cell phone: [phone]    Originating Location (pt. Location): Home  Distant Location (provider location): [facility]  Mode of Communication:  Video Conference via DediServe    Video Start Time:  759am  Video End Time (time video stopped): 900am    How would patient like to obtain AVS? Mail a copy      Assessment    Discussed pathophysiology, A1C testing and goals.  Meal log discussed, recommendations made, eating out tips, importance of well balanced diet and protein with snacks.  BGM ordered and will begin testing.  Ozempic pen admin reviewed and side effects discussed and tips to avoid side effects.  Follow-up in 3 weeks.    Patient's most recent   Lab Results   Component Value Date    A1C 6.6 02/12/2025    A1C 5.6 03/03/2021     is meeting goal of <7.0    Diabetes knowledge and skills assessment:   Patient is knowledgeable in diabetes management concepts related to: Healthy Eating, Monitoring, and Taking Medication    Based on learning assessment above, most appropriate setting for further diabetes education would be: Individual setting.    Care Plan and Education Provided:  Healthy Eating: Balanced meals, Carbohydrate Counting, Consistency in amount and timing of carbohydrate intake, Eating out, Label reading, Plate planning method, Portion control, and Weight Management, Monitoring: Frequency of monitoring, Individual glucose targets, and Log and interpret results, and Taking Medication: Action of prescribed  medication(s), Administering and storing injectable diabetes medications, Proper site selection and rotation for injections, Side effects of prescribed medication(s), When to take medication(s), and GLP-1/GIP Instruction - Ozempic administration technique taught today. Patient verbalized understanding.  Side effects were discussed, if patient has any abdominal pain, with or without nausea and/or vomiting, stop medication, call provider, clinic or go to the emergency room.    Patient verbalized understanding of diabetes self-management education concepts discussed, opportunities for ongoing education and support, and recommendations provided today.    Plan    Patient Instructions   Try a protein bar or protein shake or Fairlife milk for breakfast.  Baby.com.br emmie to help with nutrition information when you don't have a label.  It is important to have carb at all your meals and snacks.  Test your blood sugars 4-7 times weekly.  Before meals or 2 hours after a meal are good times to test.  Schedule dilated eye exam.    Iliana Oliveira, PharmD, [facility], [facility] and [place] [facility]    [place] [facility] for the [facility]:  For Your Diabetes Education and Nutrition Appointments Call:  [phone]   For Diabetes Education or Nutrition Related Questions:   Phone: [phone]  Send Qapa Message   If you need a medication refill please contact your pharmacy. Please allow 3 business days for your refills to be completed.    Healthy Eating:    Follow the plate method as discussed at meals.  Focus on eating more lean proteins (fish, pork, chicken) and whole grains/whole wheat products (whole wheat bread, brown rice, whole wheat pastas).  These are healthier in general and have less of an impact on your blood sugar.    Limit carbohydrates to 3 to 4 carb choices (45-60 grams) at meals.   1 carb choice = 15 grams    You can have 1-2 carb choices (15-30 grams) at  "snacks, if desired.  Just make sure you are eating a mix of healthy carbs and protein.  Examples (whole grain crackers and cheese, fruit and cottage cheese, fruit and nuts, whole wheat toast and peanut butter, protein shake, high protein granola bar)    Remember, protein slows down the absorption of the carbohydrate, making less of a spike in your glucose, so it is important to incorporate protein into ALL of your meals and snacks.    When reading nutrition labels, start with the serving size.  The amount of total carbohydrate listed is for that serving size.  If you eat more than the listed serving size, you have to adjust that amount accordingly.    Eating out/take out often provides too much food and especially carbohydrates.  Focus on ordering your meals to make them look like \"the plate\" and have a salad with your burger instead of fries, etc.  You can share your meal with someone.  Some also will immediately put half of their meal in a to-go container to eat the next day.    Exercise:    Goals for exercise in diabetes is 150 minutes per week of moderate intensity exercise AND 2 to 3 days a week of strength training.      Moderate intensity exercise is more than your normal routines of house cleaning or walking around at work.  We want your heart rate elevated and at least breaking a slight sweat. This could be a brisk walk outdoors, inside your house, on a treadmill. It could be stationary bike or biking outdoors.  It could be a sport you enjoy.  Make it fun.  Mix it up.  Try to be active at least 3 days per week, with no more than 2 days in a row without exercise.  A good way to spread it out is 5 days a week for 30 minutes.  If this too much for you at first, start small and work your way up to this goal.    Strength training can be free weights cans of vegetables at home. It can also be free weights or weight machines at the local gym. Many health insurance plans, especially Medicare plans, offer free or " discounted gym memberships!  Climbing stairs at home is also an option.  Focus on your large muscle groups in your upper body, core and lower body.  Aim for 1 set of 10 repetitions of 5 different exercises involving these large muscle groups. Resistance bands are inexpensive and provide many exercises.  See the following website for ideas/examples of how to use resistance bands:    https://Balluun.be/qlWAYEXsAzo      Monitoring:     Monitoring your blood sugar is of utmost importance.  Knowing where you are at with your blood sugars prevents surprises and unwanted medication in the future.  Follow your instructions for how often you should test your blood sugar from your provider or diabetes educator.  If you notice a change in your blood sugars from your normal ranges, contact your diabetes educator or provider.    Morning fasting blood glucose tests should be taken before eating or drinking anything and within 30 minutes of waking up.    Post meal blood sugars should be taken 2 hours after the START of your meal.    Goals for blood sugars:  Morning fasting or before meals   2 hours after a meal, <180.    Appointments you may need to schedule:    You need to have a DILATED eye exam when you first get diagnosed with Type 2 diabetes, and every year thereafter.  Please call to get this scheduled.  Have your records sent to your doctor's office after each exam.    If you have not been seeing your dentist regularly for cleanings, please call to make an appointment.  Routine dental cleanings are usually a covered service under most dental plans.      Topics to cover at upcoming visits: Being Active, Problem Solving, Reducing Risks, and Healthy Coping- declines group classes.    Follow-up:  Upcoming Diabetes Ed Appointments     Visit Type Date Time Department    DIABETES ED 3/12/2025  8:00 AM ER DIABETES ED    DIABETES ED 4/3/2025  8:00 AM PH DIABETES EDUCATION        See Care Plan for co-developed, patient-state  behavior change goals.    Education Materials Provided:  - [employer] Healthy Living with Diabetes Book  - My Plate Planner       Subjective/Objective  Renate is an 50 year old year old, presenting for the following diabetes education related to: Presents for: Initial Assessment for new diagnosis  Accompanied by: Self  Diabetes education in the past 24mo: (Patient-Rptd) No  Focus of Visit: Taking Medication, Monitoring  Diabetes type: (Patient-Rptd) Type 2  Date of diagnosis: Feb 2025  Disease course: (Patient-Rptd) Worsening  How confident are you filling out medical forms by yourself:: (Patient-Rptd) Extremely  Diabetes management related comments/concerns: (Patient-Rptd) I don't think so  Difficulty affording diabetes medication?: Yes  Other concerns:: None  Cultural Influences/Ethnic Background:  Not  or     Renate is seen today over video for initial diabetes education.  She was diagnosed in Feb and Zepbound was ordered.  It was changed to Ozempic for cost reasons.    Diabetes Medication(s)       Incretin Mimetic Agents       semaglutide (OZEMPIC) 2 MG/3ML pen Inject 0.25 mg subcutaneously every 7 days.            H/o GDM 19 years ago.    Knows how to test Bgs with BGM.  Reviewed testing times and goals for A1C <7.    Normal breakfast was oatmeal/toast/butter OR egg and toast but it didn't make her feel good so now doesn't eat from dinner time (7pm) until lunch (1pm).  Is willing to try something again.  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  Good choices available.  Cooks at home for most evening meals.      Ozempic is ordered and will  tonight.  Copay is $25.  Has not had any instruction on this.        Diabetes Symptoms & Complications:  Diabetes Related Symptoms: (Patient-Rptd) Fatigue, Polyuria (increased urination)  Weight trend: (Patient-Rptd) Increasing  Symptom course: (Patient-Rptd) Stable  Disease course: (Patient-Rptd) Worsening  Complications assessed  "today?: No    Patient Problem List and Family Medical History reviewed for relevant medical history, current medical status, and diabetes risk factors.    Vitals:  LMP  (LMP Unknown)   Estimated body mass index is 49.92 kg/m  as calculated from the following:    Height as of 3/4/25: 1.59 m (5' 2.6\").    Weight as of 3/4/25: 126.2 kg (278 lb 3.2 oz).   Last 3 BP:   BP Readings from Last 3 Encounters:   03/04/25 130/72   12/12/23 126/86   10/25/23 117/79       History   Smoking Status     Some Days     Types: Cigarettes   Smokeless Tobacco     Never       Labs:  Lab Results   Component Value Date    A1C 6.6 02/12/2025    A1C 5.6 03/03/2021     Lab Results   Component Value Date     02/12/2025     11/04/2022    GLC 98 03/03/2021     Lab Results   Component Value Date     02/12/2025     02/24/2016     HDL Cholesterol   Date Value Ref Range Status   02/24/2016 62 >49 mg/dL Final     Direct Measure HDL   Date Value Ref Range Status   02/12/2025 59 >=50 mg/dL Final   ]  GFR Estimate   Date Value Ref Range Status   02/12/2025 >90 >60 mL/min/1.73m2 Final     Comment:     eGFR calculated using 2021 CKD-EPI equation.   03/03/2021 >90 >60 mL/min/[1.73_m2] Final     Comment:     Non  GFR Calc  Starting 12/18/2018, serum creatinine based estimated GFR (eGFR) will be   calculated using the Chronic Kidney Disease Epidemiology Collaboration   (CKD-EPI) equation.       GFR Estimate If Black   Date Value Ref Range Status   03/03/2021 >90 >60 mL/min/[1.73_m2] Final     Comment:      GFR Calc  Starting 12/18/2018, serum creatinine based estimated GFR (eGFR) will be   calculated using the Chronic Kidney Disease Epidemiology Collaboration   (CKD-EPI) equation.       Lab Results   Component Value Date    CR 0.61 02/12/2025    CR 0.61 03/03/2021     No results found for: \"MICROALBUMIN\"    Healthy Eating:  Healthy Eating Assessed Today: Yes  Cultural/Protestant diet restrictions?: " (Patient-Rptd) No  Do you have any food allergies or intolerances?: No  Meal planning/habits: Avoiding sweets  Who cooks/prepares meals for you?: Self, Spouse  Who purchases food in  your home?: Self, Spouse  How many times a week on average do you eat food made away from home (restaurant/take-out)?: (Patient-Rptd) 4  Meals include: (Patient-Rptd) Lunch, Dinner  Breakfast: coffee and water  Lunch: 1pm: eat at work: meat/veg OR stir mckeon  Dinner: meat/potatoes  Other: coffee with cream/artificial sweetener, mostly water, gingerale 12oz.  Canes- ck tenders/sm fries  Beverages: (Patient-Rptd) Water, Coffee, Diet soda, Soda      Monitoring:  Monitoring Assessed Today: Yes  Blood Glucose Meter: (Patient-Rptd) Unknown  Times checking blood sugar at home (number): (Patient-Rptd) Never        Taking Medications:  Diabetes Medication(s)       Incretin Mimetic Agents       semaglutide (OZEMPIC) 2 MG/3ML pen Inject 0.25 mg subcutaneously every 7 days.          Taking Medication Assessed Today: Yes  Current Treatments: Non-insulin Injectables    Iliana Oliveira, PharmD, [facility], [facility] and [place] [facility]    Time Spent: 61 minutes  Encounter Type: Individual    Any diabetes medication dose changes were made via the [facility] Standing Orders under the patient's referring provider.

## 2025-03-12 NOTE — PATIENT INSTRUCTIONS
Try a protein bar or protein shake or Fairlife milk for breakfast.  Calorie sergio emmie to help with nutrition information when you don't have a label.  It is important to have carb at all your meals and snacks.  Test your blood sugars 4-7 times weekly.  Before meals or 2 hours after a meal are good times to test.  Schedule dilated eye exam.    Iliana Oliveira, PharmD, [facility], [facility] and [place] [facility]    [place] [facility] Services for the [facility] Area:  For Your Diabetes Education and Nutrition Appointments Call:  [phone]   For Diabetes Education or Nutrition Related Questions:   Phone: [phone]  Send AirPair Message   If you need a medication refill please contact your pharmacy. Please allow 3 business days for your refills to be completed.    Healthy Eating:    Follow the plate method as discussed at meals.  Focus on eating more lean proteins (fish, pork, chicken) and whole grains/whole wheat products (whole wheat bread, brown rice, whole wheat pastas).  These are healthier in general and have less of an impact on your blood sugar.    Limit carbohydrates to 3 to 4 carb choices (45-60 grams) at meals.   1 carb choice = 15 grams    You can have 1-2 carb choices (15-30 grams) at snacks, if desired.  Just make sure you are eating a mix of healthy carbs and protein.  Examples (whole grain crackers and cheese, fruit and cottage cheese, fruit and nuts, whole wheat toast and peanut butter, protein shake, high protein granola bar)    Remember, protein slows down the absorption of the carbohydrate, making less of a spike in your glucose, so it is important to incorporate protein into ALL of your meals and snacks.    When reading nutrition labels, start with the serving size.  The amount of total carbohydrate listed is for that serving size.  If you eat more than the listed serving size, you have to adjust that amount accordingly.    Eating out/take out often  "provides too much food and especially carbohydrates.  Focus on ordering your meals to make them look like \"the plate\" and have a salad with your burger instead of fries, etc.  You can share your meal with someone.  Some also will immediately put half of their meal in a to-go container to eat the next day.    Exercise:    Goals for exercise in diabetes is 150 minutes per week of moderate intensity exercise AND 2 to 3 days a week of strength training.      Moderate intensity exercise is more than your normal routines of house cleaning or walking around at work.  We want your heart rate elevated and at least breaking a slight sweat. This could be a brisk walk outdoors, inside your house, on a treadmill. It could be stationary bike or biking outdoors.  It could be a sport you enjoy.  Make it fun.  Mix it up.  Try to be active at least 3 days per week, with no more than 2 days in a row without exercise.  A good way to spread it out is 5 days a week for 30 minutes.  If this too much for you at first, start small and work your way up to this goal.    Strength training can be free weights cans of vegetables at home. It can also be free weights or weight machines at the local gym. Many health insurance plans, especially Medicare plans, offer free or discounted gym memberships!  Climbing stairs at home is also an option.  Focus on your large muscle groups in your upper body, core and lower body.  Aim for 1 set of 10 repetitions of 5 different exercises involving these large muscle groups. Resistance bands are inexpensive and provide many exercises.  See the following website for ideas/examples of how to use resistance bands:    https://youBeyond Encryption Technologiesu.be/qlWAYEXsAzo      Monitoring:     Monitoring your blood sugar is of utmost importance.  Knowing where you are at with your blood sugars prevents surprises and unwanted medication in the future.  Follow your instructions for how often you should test your blood sugar from your provider " or diabetes educator.  If you notice a change in your blood sugars from your normal ranges, contact your diabetes educator or provider.    Morning fasting blood glucose tests should be taken before eating or drinking anything and within 30 minutes of waking up.    Post meal blood sugars should be taken 2 hours after the START of your meal.    Goals for blood sugars:  Morning fasting or before meals   2 hours after a meal, <180.    Appointments you may need to schedule:    You need to have a DILATED eye exam when you first get diagnosed with Type 2 diabetes, and every year thereafter.  Please call to get this scheduled.  Have your records sent to your doctor's office after each exam.    If you have not been seeing your dentist regularly for cleanings, please call to make an appointment.  Routine dental cleanings are usually a covered service under most dental plans.

## 2025-03-12 NOTE — PROGRESS NOTES
Diabetes Self-Management Education & Support    Presents for: Initial Assessment for new diagnosis, DM2    Type of service:  Video Visit    If the video visit is dropped, the video visit invitation should be resent by: Text to cell phone: [phone]    Originating Location (pt. Location): Home  Distant Location (provider location): [facility]  Mode of Communication:  Video Conference via [employer]    Video Start Time:  759am  Video End Time (time video stopped): 900am    How would patient like to obtain AVS? Mail a copy      Assessment    Discussed pathophysiology, A1C testing and goals.  Meal log discussed, recommendations made, eating out tips, importance of well balanced diet and protein with snacks.  BGM ordered and will begin testing.  Ozempic pen admin reviewed and side effects discussed and tips to avoid side effects.  Follow-up in 3 weeks.    Patient's most recent   Lab Results   Component Value Date    A1C 6.6 02/12/2025    A1C 5.6 03/03/2021     is meeting goal of <7.0    Diabetes knowledge and skills assessment:   Patient is knowledgeable in diabetes management concepts related to: Healthy Eating, Monitoring, and Taking Medication    Based on learning assessment above, most appropriate setting for further diabetes education would be: Individual setting.    Care Plan and Education Provided:  Healthy Eating: Balanced meals, Carbohydrate Counting, Consistency in amount and timing of carbohydrate intake, Eating out, Label reading, Plate planning method, Portion control, and Weight Management, Monitoring: Frequency of monitoring, Individual glucose targets, and Log and interpret results, and Taking Medication: Action of prescribed medication(s), Administering and storing injectable diabetes medications, Proper site selection and rotation for injections, Side effects of prescribed medication(s), When to take medication(s), and GLP-1/GIP Instruction - Ozempic administration technique  taught today. Patient verbalized understanding.  Side effects were discussed, if patient has any abdominal pain, with or without nausea and/or vomiting, stop medication, call provider, clinic or go to the emergency room.    Patient verbalized understanding of diabetes self-management education concepts discussed, opportunities for ongoing education and support, and recommendations provided today.    Plan    Patient Instructions   Try a protein bar or protein shake or [employer] milk for breakfast.  [employer] emmie to help with nutrition information when you don't have a label.  It is important to have carb at all your meals and snacks.  Test your blood sugars 4-7 times weekly.  Before meals or 2 hours after a meal are good times to test.  Schedule dilated eye exam.    Iliana Oliveira, PharmD, [facility] [place], [facility] and [place] [facility]    [facility] for the [facility]:  For Your Diabetes Education and Nutrition Appointments Call:  [phone]   For Diabetes Education or Nutrition Related Questions:   Phone: [phone]  Send [employer] Message   If you need a medication refill please contact your pharmacy. Please allow 3 business days for your refills to be completed.    Healthy Eating:    Follow the plate method as discussed at meals.  Focus on eating more lean proteins (fish, pork, chicken) and whole grains/whole wheat products (whole wheat bread, brown rice, whole wheat pastas).  These are healthier in general and have less of an impact on your blood sugar.    Limit carbohydrates to 3 to 4 carb choices (45-60 grams) at meals.   1 carb choice = 15 grams    You can have 1-2 carb choices (15-30 grams) at snacks, if desired.  Just make sure you are eating a mix of healthy carbs and protein.  Examples (whole grain crackers and cheese, fruit and cottage cheese, fruit and nuts, whole wheat toast and peanut butter, protein shake, high protein granola  "bar)    Remember, protein slows down the absorption of the carbohydrate, making less of a spike in your glucose, so it is important to incorporate protein into ALL of your meals and snacks.    When reading nutrition labels, start with the serving size.  The amount of total carbohydrate listed is for that serving size.  If you eat more than the listed serving size, you have to adjust that amount accordingly.    Eating out/take out often provides too much food and especially carbohydrates.  Focus on ordering your meals to make them look like \"the plate\" and have a salad with your burger instead of fries, etc.  You can share your meal with someone.  Some also will immediately put half of their meal in a to-go container to eat the next day.    Exercise:    Goals for exercise in diabetes is 150 minutes per week of moderate intensity exercise AND 2 to 3 days a week of strength training.      Moderate intensity exercise is more than your normal routines of house cleaning or walking around at work.  We want your heart rate elevated and at least breaking a slight sweat. This could be a brisk walk outdoors, inside your house, on a treadmill. It could be stationary bike or biking outdoors.  It could be a sport you enjoy.  Make it fun.  Mix it up.  Try to be active at least 3 days per week, with no more than 2 days in a row without exercise.  A good way to spread it out is 5 days a week for 30 minutes.  If this too much for you at first, start small and work your way up to this goal.    Strength training can be free weights cans of vegetables at home. It can also be free weights or weight machines at the local gym. Many health insurance plans, especially Medicare plans, offer free or discounted gym memberships!  Climbing stairs at home is also an option.  Focus on your large muscle groups in your upper body, core and lower body.  Aim for 1 set of 10 repetitions of 5 different exercises involving these large muscle groups. " Resistance bands are inexpensive and provide many exercises.  See the following website for ideas/examples of how to use resistance bands:    https://youStorificu.be/qlWAYEXsAzo      Monitoring:     Monitoring your blood sugar is of utmost importance.  Knowing where you are at with your blood sugars prevents surprises and unwanted medication in the future.  Follow your instructions for how often you should test your blood sugar from your provider or diabetes educator.  If you notice a change in your blood sugars from your normal ranges, contact your diabetes educator or provider.    Morning fasting blood glucose tests should be taken before eating or drinking anything and within 30 minutes of waking up.    Post meal blood sugars should be taken 2 hours after the START of your meal.    Goals for blood sugars:  Morning fasting or before meals   2 hours after a meal, <180.    Appointments you may need to schedule:    You need to have a DILATED eye exam when you first get diagnosed with Type 2 diabetes, and every year thereafter.  Please call to get this scheduled.  Have your records sent to your doctor's office after each exam.    If you have not been seeing your dentist regularly for cleanings, please call to make an appointment.  Routine dental cleanings are usually a covered service under most dental plans.      Topics to cover at upcoming visits: Being Active, Problem Solving, Reducing Risks, and Healthy Coping- declines group classes.    Follow-up:  Upcoming Diabetes Ed Appointments     Visit Type Date Time Department    DIABETES ED 3/12/2025  8:00 AM ER DIABETES ED    DIABETES ED 4/3/2025  8:00 AM PH DIABETES EDUCATION        See Care Plan for co-developed, patient-state behavior change goals.    Education Materials Provided:  - [employer] Healthy Living with Diabetes Book  - My Plate Planner       Subjective/Objective  Renate is an 50 year old year old, presenting for the following diabetes education  related to: Presents for: Initial Assessment for new diagnosis  Accompanied by: Self  Diabetes education in the past 24mo: (Patient-Rptd) No  Focus of Visit: Taking Medication, Monitoring  Diabetes type: (Patient-Rptd) Type 2  Date of diagnosis: Feb 2025  Disease course: (Patient-Rptd) Worsening  How confident are you filling out medical forms by yourself:: (Patient-Rptd) Extremely  Diabetes management related comments/concerns: (Patient-Rptd) I don't think so  Difficulty affording diabetes medication?: Yes  Other concerns:: None  Cultural Influences/Ethnic Background:  Not  or     Renate is seen today over video for initial diabetes education.  She was diagnosed in Feb and Zepbound was ordered.  It was changed to Ozempic for cost reasons.    Diabetes Medication(s)       Incretin Mimetic Agents       semaglutide (OZEMPIC) 2 MG/3ML pen Inject 0.25 mg subcutaneously every 7 days.            H/o GDM 19 years ago.    Knows how to test Bgs with BGM.  Reviewed testing times and goals for A1C <7.    Normal breakfast was oatmeal/toast/butter OR egg and toast but it didn't make her feel good so now doesn't eat from dinner time (7pm) until lunch (1pm).  Is willing to try something again.  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  Good choices available.  Cooks at home for most evening meals.      Ozempic is ordered and will  tonight.  Copay is $25.  Has not had any instruction on this.        Diabetes Symptoms & Complications:  Diabetes Related Symptoms: (Patient-Rptd) Fatigue, Polyuria (increased urination)  Weight trend: (Patient-Rptd) Increasing  Symptom course: (Patient-Rptd) Stable  Disease course: (Patient-Rptd) Worsening  Complications assessed today?: No    Patient Problem List and Family Medical History reviewed for relevant medical history, current medical status, and diabetes risk factors.    Vitals:  LMP  (LMP Unknown)   Estimated body mass index is 49.92 kg/m  as calculated  "from the following:    Height as of 3/4/25: 1.59 m (5' 2.6\").    Weight as of 3/4/25: 126.2 kg (278 lb 3.2 oz).   Last 3 BP:   BP Readings from Last 3 Encounters:   03/04/25 130/72   12/12/23 126/86   10/25/23 117/79       History   Smoking Status    Some Days    Types: Cigarettes   Smokeless Tobacco    Never       Labs:  Lab Results   Component Value Date    A1C 6.6 02/12/2025    A1C 5.6 03/03/2021     Lab Results   Component Value Date     02/12/2025     11/04/2022    GLC 98 03/03/2021     Lab Results   Component Value Date     02/12/2025     02/24/2016     HDL Cholesterol   Date Value Ref Range Status   02/24/2016 62 >49 mg/dL Final     Direct Measure HDL   Date Value Ref Range Status   02/12/2025 59 >=50 mg/dL Final   ]  GFR Estimate   Date Value Ref Range Status   02/12/2025 >90 >60 mL/min/1.73m2 Final     Comment:     eGFR calculated using 2021 CKD-EPI equation.   03/03/2021 >90 >60 mL/min/[1.73_m2] Final     Comment:     Non  GFR Calc  Starting 12/18/2018, serum creatinine based estimated GFR (eGFR) will be   calculated using the Chronic Kidney Disease Epidemiology Collaboration   (CKD-EPI) equation.       GFR Estimate If Black   Date Value Ref Range Status   03/03/2021 >90 >60 mL/min/[1.73_m2] Final     Comment:      GFR Calc  Starting 12/18/2018, serum creatinine based estimated GFR (eGFR) will be   calculated using the Chronic Kidney Disease Epidemiology Collaboration   (CKD-EPI) equation.       Lab Results   Component Value Date    CR 0.61 02/12/2025    CR 0.61 03/03/2021     No results found for: \"MICROALBUMIN\"    Healthy Eating:  Healthy Eating Assessed Today: Yes  Cultural/Yarsanism diet restrictions?: (Patient-Rptd) No  Do you have any food allergies or intolerances?: No  Meal planning/habits: Avoiding sweets  Who cooks/prepares meals for you?: Self, Spouse  Who purchases food in  your home?: Self, Spouse  How many times a week on average do " you eat food made away from home (restaurant/take-out)?: (Patient-Rptd) 4  Meals include: (Patient-Rptd) Lunch, Dinner  Breakfast: coffee and water  Lunch: 1pm: eat at work: meat/veg OR stir mckeon  Dinner: meat/potatoes  Other: coffee with cream/artificial sweetener, mostly water, gingerale 12oz.  Canes- ck tenders/sm fries  Beverages: (Patient-Rptd) Water, Coffee, Diet soda, Soda      Monitoring:  Monitoring Assessed Today: Yes  Blood Glucose Meter: (Patient-Rptd) Unknown  Times checking blood sugar at home (number): (Patient-Rptd) Never        Taking Medications:  Diabetes Medication(s)       Incretin Mimetic Agents       semaglutide (OZEMPIC) 2 MG/3ML pen Inject 0.25 mg subcutaneously every 7 days.          Taking Medication Assessed Today: Yes  Current Treatments: Non-insulin Injectables    Iliana Oliveira, PharmD, [facility], [facility] and [place] [facility]    Time Spent: 61 minutes  Encounter Type: Individual    Any diabetes medication dose changes were made via the [facility] Standing Orders under the patient's referring provider.

## 2025-04-01 LAB — NONINV COLON CA DNA+OCC BLD SCRN STL QL: NEGATIVE

## 2025-04-03 ENCOUNTER — VIRTUAL VISIT (OUTPATIENT)
Dept: EDUCATION SERVICES | Facility: CLINIC | Age: 50
End: 2025-04-03
Payer: COMMERCIAL

## 2025-04-03 DIAGNOSIS — E11.65 TYPE 2 DIABETES MELLITUS WITH HYPERGLYCEMIA, WITHOUT LONG-TERM CURRENT USE OF INSULIN (H): ICD-10-CM

## 2025-04-03 DIAGNOSIS — E66.01 CLASS 3 SEVERE OBESITY DUE TO EXCESS CALORIES WITH SERIOUS COMORBIDITY AND BODY MASS INDEX (BMI) OF 45.0 TO 49.9 IN ADULT (H): ICD-10-CM

## 2025-04-03 DIAGNOSIS — E66.813 CLASS 3 SEVERE OBESITY DUE TO EXCESS CALORIES WITH SERIOUS COMORBIDITY AND BODY MASS INDEX (BMI) OF 45.0 TO 49.9 IN ADULT (H): ICD-10-CM

## 2025-04-03 RX ORDER — HYDROCHLOROTHIAZIDE 12.5 MG/1
CAPSULE ORAL
Qty: 1 EACH | Refills: 0 | Status: SHIPPED | OUTPATIENT
Start: 2025-04-03

## 2025-04-03 NOTE — PROGRESS NOTES
Diabetes Self-Management Education & Support    Presents for: Follow-up for new dx T2DM education    Type of service:  Video Visit    If the video visit is dropped, the video visit invitation should be resent by: Text to cell phone: [phone]    Originating Location (pt. Location): Home  Distant Location (provider location): [facility] DIABETES EDUCATION [place]  Mode of Communication:  Video Conference via [employer]    Video Start Time: 759am  Video End Time (time video stopped): 842am    How would patient like to obtain AVS? MyChart      Assessment  Explained dosing plan for Ozempic- 0.25mg x 1 more week, then up to 0.5mg weekly.  Refills sent.  Discussed tips to avoid side effects.  Congratulated on diet changes.  Bgs stable.  Started exercise and encouraged increases as able- up to recommended amounts.  Not sure on insurance coverage for CGM with [employer].  Ordered her 1 sensor and she will get voucher off website.  If she likes it, she can see if her insurance will cover without insulin and message me if she wants refills sent on this.  If she wears CGM intermittently (2 weeks out of 2 month span), she won't have to do fingersticks.  Otherwise, recommend fingersticks 2-3 times a week, mixing up the times.  Discussed s/s of high and low BS, how to treat a low.  When to call .  Discussed risks and monitoring to prevent complications.  Discussed coping with dx.  Sees PCP in 2 months.  No follow-up needed with me unless she desires.    Patient's most recent   Lab Results   Component Value Date    A1C 6.6 02/12/2025    A1C 5.6 03/03/2021     is meeting goal of <7.0    Diabetes knowledge and skills assessment:   Patient is knowledgeable in diabetes management concepts related to: Healthy Eating, Being Active, Monitoring, Taking Medication, Problem Solving, Reducing Risks, and Healthy Coping    Based on learning assessment above, most appropriate setting for further diabetes education would be: Individual  setting.    Care Plan and Education Provided:  Healthy Eating: Balanced meals, Plate planning method, Portion control, and Weight Management, Being Active: Amount recommended (150 minutes moderate or 75 minutes vigorous activity and 2-3 days strength training per week) and keep increasing as able, use variety of activities, Monitoring: Blood glucose versus Continuous Glucose Monitoring, Frequency of monitoring, Individual glucose targets, and Log and interpret results, Taking Medication: Action of prescribed medication(s), Administering and storing injectable diabetes medications, Side effects of prescribed medication(s), and When to take medication(s), Problem Solving: High glucose - causes, signs/symptoms, treatment and prevention, Low glucose - causes, signs/symptoms, treatment and prevention, Rule of 15 and carrying a carbohydrate source at all times in case of low glucose, and When to call a health care provider, Reducing Risks: Complications of diabetes, Dental care, Eye care, Foot care, Goal for A1c, how it relates to glucose and how often to check, and Preventing cardiovascular disease, including blood pressure goals, lipid goals, recommendations for cardioprotective medications, statins, and aspirin, and Healthy Coping: Benefits of making appropriate lifestyle changes, Recognize feelings about diagnosis, and Utilize support systems    Patient verbalized understanding of diabetes self-management education concepts discussed, opportunities for ongoing education and support, and recommendations provided today.    Plan    Patient Instructions   1.  Schedule dilated eye exam and have it done every year.  2.  Increase activity as able, goal of 150 minutes/week of moderate intensity exercise + strength training 2-3 days/week.  3.  Continue Ozempic as discussed.  4.  Continue testing blood sugars 2-3 times a week- combination of morning fasting and 2 hours after a meal.    5. Sample Flex prescription sent to  [facility].  Let me know if you want refills on it for the future.    Iliana Oliveiar, PharmD, [facility], [facility] and [place] [facility]    [place] [facility] and Nutrition Services for the [facility] Area:  For Your Diabetes Education and Nutrition Appointments Call:  [phone]   For Diabetes Education or Nutrition Related Questions:   Phone: [phone]  Send P[employer] Message   If you need a medication refill please contact your pharmacy. Please allow 3 business days for your refills to be completed.      Topics to cover at upcoming visits: none- all complete    Follow-up:  Upcoming Diabetes Ed Appointments     Visit Type Date Time Department    DIABETES ED 4/3/2025  8:00 AM PH DIABETES EDUCATION        See Care Plan for co-developed, patient-state behavior change goals.    Education Materials Provided:  No new materials provided today      Subjective/Objective  Renate is an 50 year old year old, presenting for the following diabetes education related to: Presents for: Follow-up  Accompanied by: Self  Diabetes education in the past 24mo: Yes  Focus of Visit: Taking Medication, Monitoring  Diabetes type: Type 2  Date of diagnosis: Feb 2025  Disease course: Improving  How confident are you filling out medical forms by yourself:: Extremely  Difficulty affording diabetes medication?: Yes  Difficulty affording diabetes testing supplies?: No  Other concerns:: None  Cultural Influences/Ethnic Background:  Not  or     Stephanie Mccarthy is seen for diabetes education follow up.  Our last visit was on 3/12/25.  She started on Ozempic and her BGM, asked to test 4-7 times a week.    Diabetes Medication(s)       Incretin Mimetic Agents       semaglutide (OZEMPIC) 2 MG/3ML pen Inject 0.25 mg subcutaneously every 7 days.          Renate reports Ozempic is going fine, has had 3 doses of the 0.25mg weekly.  Understands dosing plan moving forward.    BGM recall:  109 first week  Last  "week, morning fasting 91 108 106  This week, morning fasting 105 107    Diet updates:  Quit late night snacks  Protein bar, cheese stick, yogurt to work- eats these in the morning vs before when she would skip breakfast and just have coffee.      Activity updates:  When works at home- locks in and doesn't leave so when at work has more social interaction.  So has been trying to go to the office every day to get moving.  Started home pilates program using an emmie.  Enjoys it.  Helps with muscle tension.  Purchased a massage package to reward herself.  Julian far away in the lot, etc.    Reports she has lost weight, now at 259#    Renate asks about CGM - she sees a lot of people wearing them.        Diabetes Symptoms & Complications:  Diabetes Related Symptoms: Fatigue, Polyuria (increased urination)  Symptom course: Stable  Disease course: Improving  Complications assessed today?: No    Patient Problem List and Family Medical History reviewed for relevant medical history, current medical status, and diabetes risk factors.    Vitals:  LMP  (LMP Unknown)   Estimated body mass index is 49.92 kg/m  as calculated from the following:    Height as of 3/4/25: 1.59 m (5' 2.6\").    Weight as of 3/4/25: 126.2 kg (278 lb 3.2 oz).   Last 3 BP:   BP Readings from Last 3 Encounters:   03/04/25 130/72   12/12/23 126/86   10/25/23 117/79       History   Smoking Status    Some Days    Types: Cigarettes   Smokeless Tobacco    Never       Labs:  Lab Results   Component Value Date    A1C 6.6 02/12/2025    A1C 5.6 03/03/2021     Lab Results   Component Value Date     02/12/2025     11/04/2022    GLC 98 03/03/2021     Lab Results   Component Value Date     02/12/2025     02/24/2016     HDL Cholesterol   Date Value Ref Range Status   02/24/2016 62 >49 mg/dL Final     Direct Measure HDL   Date Value Ref Range Status   02/12/2025 59 >=50 mg/dL Final   ]  GFR Estimate   Date Value Ref Range Status   02/12/2025 >90 >60 " "mL/min/1.73m2 Final     Comment:     eGFR calculated using 2021 CKD-EPI equation.   03/03/2021 >90 >60 mL/min/[1.73_m2] Final     Comment:     Non  GFR Calc  Starting 12/18/2018, serum creatinine based estimated GFR (eGFR) will be   calculated using the Chronic Kidney Disease Epidemiology Collaboration   (CKD-EPI) equation.       GFR Estimate If Black   Date Value Ref Range Status   03/03/2021 >90 >60 mL/min/[1.73_m2] Final     Comment:      GFR Calc  Starting 12/18/2018, serum creatinine based estimated GFR (eGFR) will be   calculated using the Chronic Kidney Disease Epidemiology Collaboration   (CKD-EPI) equation.       Lab Results   Component Value Date    CR 0.61 02/12/2025    CR 0.61 03/03/2021     No results found for: \"MICROALBUMIN\"    Healthy Eating:  Healthy Eating Assessed Today: Yes  Cultural/Confucianism diet restrictions?: No  Do you have any food allergies or intolerances?: No  Meal planning/habits: Avoiding sweets, Plate planning method  Who cooks/prepares meals for you?: Self, Spouse  Who purchases food in  your home?: Self, Spouse  How many times a week on average do you eat food made away from home (restaurant/take-out)?: 4  Meals include: Lunch, Dinner  Breakfast: coffee and water  Lunch: 1pm: eat at work: meat/veg OR stir mckeon  Dinner: meat/potatoes  Other: coffee with cream/artificial sweetener, mostly water, gingerale 12oz.  Canes- ck tenders/sm fries  Beverages: Water, Coffee, Diet soda, Soda  Has patient met with a dietitian in the past?: No    Being Active:  Being Active Assessed Today: Yes  Exercise:: Yes  Days per week of moderate to strenuous exercise (like a brisk walk): 4  On average, minutes per day of exercise at this level: 20  How intense was your typical exercise? : Light (like stretching or slow walking)  Exercise Minutes per Week: 80  Barrier to exercise: None    Monitoring:  Monitoring Assessed Today: Yes  Did patient bring glucose meter to " appointment? : Yes  Blood Glucose Meter: Other  Times checking blood sugar at home (number): 5+  Times checking blood sugar at home (per): Week  Blood glucose trend: Decreasing        Taking Medications:  Diabetes Medication(s)       Incretin Mimetic Agents       semaglutide (OZEMPIC) 2 MG/3ML pen Inject 0.5 mg subcutaneously every 7 days.          Taking Medication Assessed Today: Yes  Current Treatments: Non-insulin Injectables  Problems taking diabetes medications regularly?: No  Diabetes medication side effects?: No    Problem Solving:  Problem Solving Assessed Today: No  Is the patient at risk for hypoglycemia?: No  Is the patient at risk for DKA?: No  Does patient have severe weather/disaster plan for diabetes management?: No  Does patient have sick day plan for diabetes management?: No        Reducing Risks:  Reducing Risks Assessed Today: Yes  Diabetes Risks: Hypertriglyceridemia, Sedentary Lifestyle, Ethnicity, Hyperlipidemia, Family History, Age over 45 years, History of gestational diabetes  Additional female risks: Gestational Diabetes  CAD Risks: Diabetes Mellitus, Dyslipidemia, Hypertension, Obesity, Sedentary lifestyle, Family history  Has dilated eye exam at least once a year?: No  Sees dentist every 6 months?: Yes  Feet checked by healthcare provider in the last year?: Yes    Healthy Coping:  Healthy Coping Assessed Today: Yes  Emotional response to diabetes: Acceptance, Concern for health and well-being  Informal Support system:: Spouse, Children  Stage of change: ACTION (Actively working towards change)  Support resources: Websites    Iliana Oliveira, PharmD, [facility], [facility] and [place] [facility] Education    Time Spent: 43 minutes  Encounter Type: Individual    Any diabetes medication dose changes were made via the [facility] Standing Orders under the patient's referring provider.

## 2025-04-03 NOTE — LETTER
4/3/2025         RE: Stephanie Mccarthy  [id number]  [place] [address]        Dear Colleague,    Thank you for referring your patient, Stephanie Mccarthy, to the [facility] DIABETES EDUCATION [place]. Please see a copy of my visit note below.    Diabetes Self-Management Education & Support    Presents for: Follow-up for new dx T2DM education    Type of service:  Video Visit    If the video visit is dropped, the video visit invitation should be resent by: Text to cell phone: [phone]    Originating Location (pt. Location): Home  Distant Location (provider location): [facility] DIABETES [facility]  Mode of Communication:  Video Conference via [employer]    Video Start Time: 759am  Video End Time (time video stopped): 842am    How would patient like to obtain AVS? MyChart      Assessment  Explained dosing plan for Ozempic- 0.25mg x 1 more week, then up to 0.5mg weekly.  Refills sent.  Discussed tips to avoid side effects.  Congratulated on diet changes.  Bgs stable.  Started exercise and encouraged increases as able- up to recommended amounts.  Not sure on insurance coverage for CGM with [employer].  Ordered her 1 sensor and she will get voucher off website.  If she likes it, she can see if her insurance will cover without insulin and message me if she wants refills sent on this.  If she wears CGM intermittently (2 weeks out of 2 month span), she won't have to do fingersticks.  Otherwise, recommend fingersticks 2-3 times a week, mixing up the times.  Discussed s/s of high and low BS, how to treat a low.  When to call   Discussed risks and monitoring to prevent complications.  Discussed coping with dx.  Sees PCP in 2 months.  No follow-up needed with me unless she desires.    Patient's most recent   Lab Results   Component Value Date    A1C 6.6 02/12/2025    A1C 5.6 03/03/2021     is meeting goal of <7.0    Diabetes knowledge and skills assessment:   Patient is knowledgeable in  diabetes management concepts related to: Healthy Eating, Being Active, Monitoring, Taking Medication, Problem Solving, Reducing Risks, and Healthy Coping    Based on learning assessment above, most appropriate setting for further diabetes education would be: Individual setting.    Care Plan and Education Provided:  Healthy Eating: Balanced meals, Plate planning method, Portion control, and Weight Management, Being Active: Amount recommended (150 minutes moderate or 75 minutes vigorous activity and 2-3 days strength training per week) and keep increasing as able, use variety of activities, Monitoring: Blood glucose versus Continuous Glucose Monitoring, Frequency of monitoring, Individual glucose targets, and Log and interpret results, Taking Medication: Action of prescribed medication(s), Administering and storing injectable diabetes medications, Side effects of prescribed medication(s), and When to take medication(s), Problem Solving: High glucose - causes, signs/symptoms, treatment and prevention, Low glucose - causes, signs/symptoms, treatment and prevention, Rule of 15 and carrying a carbohydrate source at all times in case of low glucose, and When to call a health care provider, Reducing Risks: Complications of diabetes, Dental care, Eye care, Foot care, Goal for A1c, how it relates to glucose and how often to check, and Preventing cardiovascular disease, including blood pressure goals, lipid goals, recommendations for cardioprotective medications, statins, and aspirin, and Healthy Coping: Benefits of making appropriate lifestyle changes, Recognize feelings about diagnosis, and Utilize support systems    Patient verbalized understanding of diabetes self-management education concepts discussed, opportunities for ongoing education and support, and recommendations provided today.    Plan    Patient Instructions   1.  Schedule dilated eye exam and have it done every year.  2.  Increase activity as able, goal of 150  minutes/week of moderate intensity exercise + strength training 2-3 days/week.  3.  Continue Ozempic as discussed.  4.  Continue testing blood sugars 2-3 times a week- combination of morning fasting and 2 hours after a meal.    5. Sample Flex prescription sent to [facility].  Let me know if you want refills on it for the future.    Iliana Oliveira, PharmD, [facility], [facility] and [place] [facility] Education    [place] Diabetes Education and Nutrition Services for the [facility] Area:  For Your Diabetes Education and Nutrition Appointments Call:  [phone]   For Diabetes Education or Nutrition Related Questions:   Phone: [phone]  Send [employer]fer Message   If you need a medication refill please contact your pharmacy. Please allow 3 business days for your refills to be completed.      Topics to cover at upcoming visits: none- all complete    Follow-up:  Upcoming Diabetes Ed Appointments     Visit Type Date Time Department    DIABETES ED 4/3/2025  8:00 AM PH DIABETES EDUCATION        See Care Plan for co-developed, patient-state behavior change goals.    Education Materials Provided:  No new materials provided today      Subjective/Objective  Renate is an 50 year old year old, presenting for the following diabetes education related to: Presents for: Follow-up  Accompanied by: Self  Diabetes education in the past 24mo: Yes  Focus of Visit: Taking Medication, Monitoring  Diabetes type: Type 2  Date of diagnosis: Feb 2025  Disease course: Improving  How confident are you filling out medical forms by yourself:: Extremely  Difficulty affording diabetes medication?: Yes  Difficulty affording diabetes testing supplies?: No  Other concerns:: None  Cultural Influences/Ethnic Background:  Not  or     Stephanie Mccarthy is seen for diabetes education follow up.  Our last visit was on 3/12/25.  She started on Ozempic and her BGM, asked to test 4-7 times a week.    Diabetes Medication(s)       Incretin  "Mimetic Agents       semaglutide (OZEMPIC) 2 MG/3ML pen Inject 0.25 mg subcutaneously every 7 days.          Renate reports Ozempic is going fine, has had 3 doses of the 0.25mg weekly.  Understands dosing plan moving forward.    BGM recall:  109 first week  Last week, morning fasting 91 108 106  This week, morning fasting 105 107    Diet updates:  Quit late night snacks  Protein bar, cheese stick, yogurt to work- eats these in the morning vs before when she would skip breakfast and just have coffee.      Activity updates:  When works at home- locks in and doesn't leave so when at work has more social interaction.  So has been trying to go to the office every day to get moving.  Started home pilates program using an emmie.  Enjoys it.  Helps with muscle tension.  Purchased a massage package to reward herself.  Julian far away in the lot, etc.    Reports she has lost weight, now at 259#    Renate asks about CGM - she sees a lot of people wearing them.        Diabetes Symptoms & Complications:  Diabetes Related Symptoms: Fatigue, Polyuria (increased urination)  Symptom course: Stable  Disease course: Improving  Complications assessed today?: No    Patient Problem List and Family Medical History reviewed for relevant medical history, current medical status, and diabetes risk factors.    Vitals:  LMP  (LMP Unknown)   Estimated body mass index is 49.92 kg/m  as calculated from the following:    Height as of 3/4/25: 1.59 m (5' 2.6\").    Weight as of 3/4/25: 126.2 kg (278 lb 3.2 oz).   Last 3 BP:   BP Readings from Last 3 Encounters:   03/04/25 130/72   12/12/23 126/86   10/25/23 117/79       History   Smoking Status     Some Days     Types: Cigarettes   Smokeless Tobacco     Never       Labs:  Lab Results   Component Value Date    A1C 6.6 02/12/2025    A1C 5.6 03/03/2021     Lab Results   Component Value Date     02/12/2025     11/04/2022    GLC 98 03/03/2021     Lab Results   Component Value Date     " "02/12/2025     02/24/2016     HDL Cholesterol   Date Value Ref Range Status   02/24/2016 62 >49 mg/dL Final     Direct Measure HDL   Date Value Ref Range Status   02/12/2025 59 >=50 mg/dL Final   ]  GFR Estimate   Date Value Ref Range Status   02/12/2025 >90 >60 mL/min/1.73m2 Final     Comment:     eGFR calculated using 2021 CKD-EPI equation.   03/03/2021 >90 >60 mL/min/[1.73_m2] Final     Comment:     Non  GFR Calc  Starting 12/18/2018, serum creatinine based estimated GFR (eGFR) will be   calculated using the Chronic Kidney Disease Epidemiology Collaboration   (CKD-EPI) equation.       GFR Estimate If Black   Date Value Ref Range Status   03/03/2021 >90 >60 mL/min/[1.73_m2] Final     Comment:      GFR Calc  Starting 12/18/2018, serum creatinine based estimated GFR (eGFR) will be   calculated using the Chronic Kidney Disease Epidemiology Collaboration   (CKD-EPI) equation.       Lab Results   Component Value Date    CR 0.61 02/12/2025    CR 0.61 03/03/2021     No results found for: \"MICROALBUMIN\"    Healthy Eating:  Healthy Eating Assessed Today: Yes  Cultural/Restorationist diet restrictions?: No  Do you have any food allergies or intolerances?: No  Meal planning/habits: Avoiding sweets, Plate planning method  Who cooks/prepares meals for you?: Self, Spouse  Who purchases food in  your home?: Self, Spouse  How many times a week on average do you eat food made away from home (restaurant/take-out)?: 4  Meals include: Lunch, Dinner  Breakfast: coffee and water  Lunch: 1pm: eat at work: meat/veg OR stir mckeon  Dinner: meat/potatoes  Other: coffee with cream/artificial sweetener, mostly water, gingerale 12oz.  Canes- ck tenders/sm fries  Beverages: Water, Coffee, Diet soda, Soda  Has patient met with a dietitian in the past?: No    Being Active:  Being Active Assessed Today: Yes  Exercise:: Yes  Days per week of moderate to strenuous exercise (like a brisk walk): 4  On average, minutes " per day of exercise at this level: 20  How intense was your typical exercise? : Light (like stretching or slow walking)  Exercise Minutes per Week: 80  Barrier to exercise: None    Monitoring:  Monitoring Assessed Today: Yes  Did patient bring glucose meter to appointment? : Yes  Blood Glucose Meter: Other  Times checking blood sugar at home (number): 5+  Times checking blood sugar at home (per): Week  Blood glucose trend: Decreasing        Taking Medications:  Diabetes Medication(s)       Incretin Mimetic Agents       semaglutide (OZEMPIC) 2 MG/3ML pen Inject 0.5 mg subcutaneously every 7 days.          Taking Medication Assessed Today: Yes  Current Treatments: Non-insulin Injectables  Problems taking diabetes medications regularly?: No  Diabetes medication side effects?: No    Problem Solving:  Problem Solving Assessed Today: No  Is the patient at risk for hypoglycemia?: No  Is the patient at risk for DKA?: No  Does patient have severe weather/disaster plan for diabetes management?: No  Does patient have sick day plan for diabetes management?: No        Reducing Risks:  Reducing Risks Assessed Today: Yes  Diabetes Risks: Hypertriglyceridemia, Sedentary Lifestyle, Ethnicity, Hyperlipidemia, Family History, Age over 45 years, History of gestational diabetes  Additional female risks: Gestational Diabetes  CAD Risks: Diabetes Mellitus, Dyslipidemia, Hypertension, Obesity, Sedentary lifestyle, Family history  Has dilated eye exam at least once a year?: No  Sees dentist every 6 months?: Yes  Feet checked by healthcare provider in the last year?: Yes    Healthy Coping:  Healthy Coping Assessed Today: Yes  Emotional response to diabetes: Acceptance, Concern for health and well-being  Informal Support system:: Spouse, Children  Stage of change: ACTION (Actively working towards change)  Support resources: Websites    Iliana Oliveira, PharmD, [facility], [facility] and [place] [facility] Education    Time Spent: 43  minutes  Encounter Type: Individual    Any diabetes medication dose changes were made via the [facility] under the patient's referring provider.

## 2025-04-03 NOTE — PATIENT INSTRUCTIONS
1.  Schedule dilated eye exam and have it done every year.  2.  Increase activity as able, goal of 150 minutes/week of moderate intensity exercise + strength training 2-3 days/week.  3.  Continue Ozempic as discussed.  4.  Continue testing blood sugars 2-3 times a week- combination of morning fasting and 2 hours after a meal.    5. [facility]  Let me know if you want refills on it for the future.    Iliana Oliveira, PharmD, [facility], [facility] and [place] [facility]    [place] [facility] for the [facility]:  For Your Diabetes Education and Nutrition Appointments Call:  [phone]   For Diabetes Education or Nutrition Related Questions:   Phone: [phone]  Send [employer] Message   If you need a medication refill please contact your pharmacy. Please allow 3 business days for your refills to be completed.

## 2025-05-07 ENCOUNTER — PATIENT OUTREACH (OUTPATIENT)
Dept: FAMILY MEDICINE | Facility: CLINIC | Age: 50
End: 2025-05-07
Payer: COMMERCIAL

## 2025-05-24 ENCOUNTER — HEALTH MAINTENANCE LETTER (OUTPATIENT)
Age: 50
End: 2025-05-24

## 2025-06-04 ENCOUNTER — OFFICE VISIT (OUTPATIENT)
Dept: FAMILY MEDICINE | Facility: CLINIC | Age: 50
End: 2025-06-04
Payer: COMMERCIAL

## 2025-06-04 ENCOUNTER — RESULTS FOLLOW-UP (OUTPATIENT)
Dept: FAMILY MEDICINE | Facility: CLINIC | Age: 50
End: 2025-06-04

## 2025-06-04 VITALS
HEIGHT: 63 IN | HEART RATE: 95 BPM | WEIGHT: 278.5 LBS | TEMPERATURE: 97.2 F | DIASTOLIC BLOOD PRESSURE: 78 MMHG | BODY MASS INDEX: 49.35 KG/M2 | OXYGEN SATURATION: 97 % | RESPIRATION RATE: 18 BRPM | SYSTOLIC BLOOD PRESSURE: 120 MMHG

## 2025-06-04 DIAGNOSIS — E11.65 TYPE 2 DIABETES MELLITUS WITH HYPERGLYCEMIA, WITHOUT LONG-TERM CURRENT USE OF INSULIN (H): Primary | ICD-10-CM

## 2025-06-04 DIAGNOSIS — E66.813 CLASS 3 SEVERE OBESITY DUE TO EXCESS CALORIES WITH SERIOUS COMORBIDITY AND BODY MASS INDEX (BMI) OF 45.0 TO 49.9 IN ADULT (H): ICD-10-CM

## 2025-06-04 LAB
EST. AVERAGE GLUCOSE BLD GHB EST-MCNC: 120 MG/DL
HBA1C MFR BLD: 5.8 % (ref 0–5.6)
HOLD SPECIMEN: NORMAL

## 2025-06-04 PROCEDURE — 99214 OFFICE O/P EST MOD 30 MIN: CPT | Performed by: FAMILY MEDICINE

## 2025-06-04 PROCEDURE — 36415 COLL VENOUS BLD VENIPUNCTURE: CPT | Performed by: FAMILY MEDICINE

## 2025-06-04 PROCEDURE — 99207 PR FOOT EXAM NO CHARGE: CPT | Performed by: FAMILY MEDICINE

## 2025-06-04 PROCEDURE — 3044F HG A1C LEVEL LT 7.0%: CPT | Performed by: FAMILY MEDICINE

## 2025-06-04 PROCEDURE — 3074F SYST BP LT 130 MM HG: CPT | Performed by: FAMILY MEDICINE

## 2025-06-04 PROCEDURE — 83036 HEMOGLOBIN GLYCOSYLATED A1C: CPT | Performed by: FAMILY MEDICINE

## 2025-06-04 PROCEDURE — 3078F DIAST BP <80 MM HG: CPT | Performed by: FAMILY MEDICINE

## 2025-06-04 NOTE — PROGRESS NOTES
Assessment & Plan     Renate was seen today for diabetes, hypertension, kidney disease and recheck medication.    Diagnoses and all orders for this visit:    Type 2 diabetes mellitus with hyperglycemia, without long-term current use of insulin (H), improved with an A1c of 5.8  -     Hemoglobin A1c  -     Adult Eye  Referral; Future  -     FOOT EXAM  -      Semaglutide, 1 MG/DOSE, (OZEMPIC) 4 MG/3ML pen; Inject 1 mg subcutaneously every 7 days.    Class 3 severe obesity due to excess calories with serious comorbidity and body mass index (BMI) of 45.0 to 49.9 in adult (H)  -     Increase dose: Semaglutide, 1 MG/DOSE, (OZEMPIC) 4 MG/3ML pen; Inject 1 mg subcutaneously every 7 days.        Return in about 3 months (around 9/4/2025) for Diabetes Follow Up with a HgbA1C prior to visit.      Subjective   Renate is a 50 year old, presenting for the following health issues:  Diabetes, Hypertension, kidney disease, and Recheck Medication      6/4/2025     8:16 AM   Additional Questions   Roomed by Poonam   Accompanied by Self     History of Present Illness       CKD: She is not using over the counter pain medicine.     Diabetes:   She presents for follow up of diabetes.  She is checking home blood glucose a few times a month.   She checks blood glucose before meals.  Blood glucose is never over 200 and sometimes under 70. She is aware of hypoglycemia symptoms including shakiness and weakness.    She has no concerns regarding her diabetes at this time.   She is not experiencing numbness or burning in feet, excessive thirst, blurry vision, weight changes or redness, sores or blisters on feet. The patient has not had a diabetic eye exam in the last 12 months.          Hypertension: She presents for follow up of hypertension.  She does not check blood pressure  regularly outside of the clinic. Outpatient blood pressures have not been over 140/90. She does not follow a low salt diet.     Reason for visit:  Check in and  follow up from annual exam    She eats 0-1 servings of fruits and vegetables daily.She consumes 1 sweetened beverage(s) daily.She exercises with enough effort to increase her heart rate 10 to 19 minutes per day.  She exercises with enough effort to increase her heart rate 3 or less days per week.   She is taking medications regularly.        Diabetes-  Recently diagnosed with diabetes.   Started on Ozempic to improve glycemic control and help with weight loss.   Due for A1c, foot exam and Eye exam       Obesity-  Recent weights-  Wt Readings from Last 4 Encounters:   06/04/25 126.3 kg (278 lb 8 oz)   03/04/25 126.2 kg (278 lb 3.2 oz)   02/24/25 124.7 kg (275 lb)   02/05/24 121.1 kg (267 lb)     Patient Active Problem List   Diagnosis    Mild major depression (H)    Anxiety    Acne    CARDIOVASCULAR SCREENING; LDL GOAL LESS THAN 160    IUD (intrauterine device) in place, paragard 11/2010    Hx gestational diabetes    Chondromalacia of patella    Mild intermittent asthma    Morbid obesity (H)    Essential hypertension    STELLA (obstructive sleep apnea)- severe (AHI 54)    Chronic kidney disease, stage 1     Past Surgical History:   Procedure Laterality Date    DENTAL SURGERY  01/01/2006    LASIK  01/01/2006       Social History     Tobacco Use    Smoking status: Some Days     Current packs/day: 0.00     Average packs/day: 0.1 packs/day for 30.0 years (3.0 ttl pk-yrs)     Types: Cigarettes     Last attempt to quit: 10/1/2009     Years since quitting: 15.6     Passive exposure: Current    Smokeless tobacco: Never    Tobacco comments:     Smoking household     Very rarely   Substance Use Topics    Alcohol use: Yes     Comment: Occasional     Family History   Problem Relation Age of Onset    Eye Surgery Mother         cataracts    Obesity Mother     Hypertension Mother     Coronary Artery Disease Father         nonfatal MI @ 52    Cancer Maternal Grandmother         lung    Heart Disease Maternal Grandmother     Diabetes  Maternal Grandmother     Breast Cancer Maternal Grandfather         mets    Cancer Paternal Grandmother     Alcohol/Drug Brother     Cerebrovascular Disease Brother         bells palsy    Alcohol/Drug Brother     Thyroid Disease No family hx of     Glaucoma No family hx of     Macular Degeneration No family hx of          Current Outpatient Medications   Medication Sig Dispense Refill    atorvastatin (LIPITOR) 20 MG tablet Take 1 tablet (20 mg) by mouth daily. 90 tablet 3    blood glucose (NO BRAND SPECIFIED) test strip Use to test blood sugar 1 time daily or as directed. To accompany: Blood Glucose Monitor Brands: per insurance. 100 strip 6    blood glucose monitoring (NO BRAND SPECIFIED) meter device kit Use to test blood sugar 1 time daily or as directed. Preferred blood glucose meter OR supplies to accompany: Blood Glucose Monitor Brands: per insurance. 1 kit 0    buPROPion (ZYBAN) 150 MG 12 hr tablet TAKE 1 TABLET(150 MG) BY MOUTH TWICE DAILY 180 tablet 0    Continuous Glucose Sensor (FREESTYLE JACOBO 3 PLUS SENSOR) MISC Change every 15 days. 1 each 0    losartan (COZAAR) 25 MG tablet Take 1 tablet (25 mg) by mouth daily. 90 tablet 3    phentermine 30 MG capsule TAKE 1 CAPSULE(30 MG) BY MOUTH EVERY MORNING 30 capsule 5    semaglutide (OZEMPIC) 2 MG/3ML pen Inject 0.5 mg subcutaneously every 7 days. 3 mL 1    Semaglutide, 1 MG/DOSE, (OZEMPIC) 4 MG/3ML pen Inject 1 mg subcutaneously every 7 days. 3 mL 0    thin (NO BRAND SPECIFIED) lancets Use with lanceting device to test blood sugars once daily. To accompany: Blood Glucose Monitor Brands: per insurance. 100 each 6    topiramate (TOPAMAX) 25 MG tablet Take 3 tablets (75 mg) by mouth 2 times daily. 360 tablet 5     Allergies   Allergen Reactions    Doxycycline      Pseudotumor cerebri    Tetracycline Other (See Comments)     Pseudotumor cerebri.      Perfume Difficulty breathing         Review of Systems  Constitutional, HEENT, cardiovascular, pulmonary, gi and gu  "systems are negative, except as otherwise noted.      Objective    /78   Pulse 95   Temp 97.2  F (36.2  C) (Temporal)   Resp 18   Ht 1.591 m (5' 2.64\")   Wt 126.3 kg (278 lb 8 oz)   LMP 06/02/2025 (Exact Date)   SpO2 97%   BMI 49.91 kg/m    Body mass index is 49.91 kg/m .  Physical Exam   GENERAL: alert and no distress  EYES: Eyes grossly normal to inspection.  No discharge or erythema, or obvious scleral/conjunctival abnormalities.  RESP: No audible wheeze, cough, or visible cyanosis.    SKIN: Visible skin clear. No significant rash, abnormal pigmentation or lesions.  NEURO: Cranial nerves grossly intact.  Mentation and speech appropriate for age.  PSYCH: Appropriate affect, tone, and pace of words  Diabetic foot exam: normal DP and PT pulses, no trophic changes or ulcerative lesions and sensation intact by monofilament bilaterally.          DATA  Reviewed and discussed with patient prior to discharge.  Results for orders placed or performed in visit on 06/04/25   Hemoglobin A1c     Status: Abnormal   Result Value Ref Range    Estimated Average Glucose 120 (H) <117 mg/dL    Hemoglobin A1C 5.8 (H) 0.0 - 5.6 %   Extra Tube     Status: None (In process)    Narrative    The following orders were created for panel order Extra Tube.  Procedure                               Abnormality         Status                     ---------                               -----------         ------                     Extra Green Top (Lithiu...[[id number]]                      In process                   Please view results for these tests on the individual orders.             Signed Electronically by: Cuca Ho MD    "

## 2025-06-05 ENCOUNTER — PATIENT OUTREACH (OUTPATIENT)
Dept: CARE COORDINATION | Facility: CLINIC | Age: 50
End: 2025-06-05
Payer: COMMERCIAL

## 2025-06-09 ENCOUNTER — PATIENT OUTREACH (OUTPATIENT)
Dept: CARE COORDINATION | Facility: CLINIC | Age: 50
End: 2025-06-09
Payer: COMMERCIAL

## 2025-06-10 ENCOUNTER — OFFICE VISIT (OUTPATIENT)
Dept: OPTOMETRY | Facility: CLINIC | Age: 50
End: 2025-06-10
Payer: COMMERCIAL

## 2025-06-10 DIAGNOSIS — H52.13 MYOPIA OF BOTH EYES: ICD-10-CM

## 2025-06-10 DIAGNOSIS — H52.222 REGULAR ASTIGMATISM OF LEFT EYE: ICD-10-CM

## 2025-06-10 DIAGNOSIS — E11.65 TYPE 2 DIABETES MELLITUS WITH HYPERGLYCEMIA, WITHOUT LONG-TERM CURRENT USE OF INSULIN (H): ICD-10-CM

## 2025-06-10 DIAGNOSIS — H52.4 PRESBYOPIA: ICD-10-CM

## 2025-06-10 DIAGNOSIS — Z01.01 VISION EXAM WITH ABNORMAL FINDINGS: Primary | ICD-10-CM

## 2025-06-10 PROCEDURE — 92015 DETERMINE REFRACTIVE STATE: CPT | Performed by: OPTOMETRIST

## 2025-06-10 PROCEDURE — 92014 COMPRE OPH EXAM EST PT 1/>: CPT | Performed by: OPTOMETRIST

## 2025-06-10 ASSESSMENT — CONF VISUAL FIELD
OS_INFERIOR_NASAL_RESTRICTION: 0
OS_NORMAL: 1
OS_SUPERIOR_NASAL_RESTRICTION: 0
OS_INFERIOR_TEMPORAL_RESTRICTION: 0
OD_SUPERIOR_NASAL_RESTRICTION: 0
METHOD: COUNTING FINGERS
OD_INFERIOR_TEMPORAL_RESTRICTION: 0
OD_NORMAL: 1
OD_SUPERIOR_TEMPORAL_RESTRICTION: 0
OS_SUPERIOR_TEMPORAL_RESTRICTION: 0
OD_INFERIOR_NASAL_RESTRICTION: 0

## 2025-06-10 ASSESSMENT — VISUAL ACUITY
METHOD: SNELLEN - LINEAR
OS_SC: 20/25
OD_SC: 20/200
OD_SC+: -2
OS_SC+: -2
OS_SC: 20/200
OD_SC: 20/30

## 2025-06-10 ASSESSMENT — REFRACTION_MANIFEST
OS_SPHERE: -0.25
OS_CYLINDER: +0.75
OD_SPHERE: -0.50
OD_SPHERE: -0.25
OS_AXIS: 007
OS_AXIS: 015
OS_CYLINDER: +0.75
OS_SPHERE: -0.25
METHOD_AUTOREFRACTION: 1
OD_CYLINDER: SPHERE
OS_ADD: +2.00
OD_ADD: +2.00

## 2025-06-10 ASSESSMENT — CUP TO DISC RATIO
OS_RATIO: 0.2
OD_RATIO: 0.2

## 2025-06-10 ASSESSMENT — KERATOMETRY
OS_K1POWER_DIOPTERS: 41.50
OD_AXISANGLE2_DEGREES: 180
OS_K2POWER_DIOPTERS: 40.75
OS_AXISANGLE2_DEGREES: 6
OD_K1POWER_DIOPTERS: 41.25
OD_K2POWER_DIOPTERS: 41.50

## 2025-06-10 ASSESSMENT — TONOMETRY
OD_IOP_MMHG: 16
IOP_METHOD: APPLANATION
OS_IOP_MMHG: 16

## 2025-06-10 ASSESSMENT — REFRACTION_WEARINGRX
OD_SPHERE: +1.50
SPECS_TYPE: SVL MIDRANGE
OD_CYLINDER: SPHERE
OS_SPHERE: +1.25
OS_AXIS: 005
OS_CYLINDER: +1.00

## 2025-06-10 ASSESSMENT — EXTERNAL EXAM - RIGHT EYE: OD_EXAM: NORMAL

## 2025-06-10 ASSESSMENT — SLIT LAMP EXAM - LIDS
COMMENTS: NORMAL
COMMENTS: NORMAL

## 2025-06-10 ASSESSMENT — EXTERNAL EXAM - LEFT EYE: OS_EXAM: NORMAL

## 2025-06-10 NOTE — PROGRESS NOTES
Chief Complaint   Patient presents with    Diabetic Eye Exam     Weekly injection of Ozempic      Dm2 no insulin used ,diagnosed  April 2025, had gestational diabetes     Hemoglobin A1C   Date Value Ref Range Status   06/04/2025 5.8 (H) 0.0 - 5.6 % Final     Comment:     Normal <5.7%   Prediabetes 5.7-6.4%    Diabetes 6.5% or higher     Note: Adopted from ADA consensus guidelines.   02/12/2025 6.6 (H) 0.0 - 5.6 % Final     Comment:     Normal <5.7%   Prediabetes 5.7-6.4%    Diabetes 6.5% or higher     Note: Adopted from ADA consensus guidelines.   03/03/2021 5.6 0 - 5.6 % Final     Comment:     Normal <5.7% Prediabetes 5.7-6.4%  Diabetes 6.5% or higher - adopted from ADA   consensus guidelines.     05/05/2014 5.3 4.3 - 6.0 % Final   04/16/2012 5.5 4.3 - 6.0 % Final          Last Eye Exam: 10/12/22  Dilated Previously: Yes    What are you currently using to see?  Readers. Did not bring them to this appointment, thinks that they are +1.25's       Distance Vision Acuity: Satisfied with vision    Near Vision Acuity: Satisfied with vision while reading and using computer with readers    Eye Comfort: good  Do you use eye drops? : No  Occupation or Hobbies: Software Tech     [employer]           Medical, surgical and family histories reviewed and updated 6/10/2025.       OBJECTIVE: See Ophthalmology exam    ASSESSMENT:    ICD-10-CM    1. Vision exam with abnormal findings  Z01.01       2. Type 2 diabetes mellitus with hyperglycemia, without long-term current use of insulin (H)  E11.65 Adult Eye  Referral      3. Presbyopia  H52.4       4. Myopia of both eyes  H52.13       5. Regular astigmatism of left eye  H52.222           PLAN:     Patient Instructions   Use over the counter readers +1.25   Consider prescription glasses if bothered by distance vision   Keep blood sugar under good control  Return in 1 year for diabetic eye exam      Blood sugar and blood pressure control are very important  in the prevention of ocular complications from diabetes.       Shayla Ruff, OD  [phone]

## 2025-06-10 NOTE — PATIENT INSTRUCTIONS
Use over the counter readers +1.25   Consider prescription glasses if bothered by distance vision   Keep blood sugar under good control  Return in 1 year for diabetic eye exam      Blood sugar and blood pressure control are very important in the prevention of ocular complications from diabetes.       Shayla Ruff, OD  [phone]

## 2025-06-10 NOTE — LETTER
6/10/2025      Stephanie Mccarthy  [address]  [place] [address]      Dear Colleague,    Thank you for referring your patient, Stephanie Mccarthy, to the [facility]. Please see a copy of my visit note below.    Chief Complaint   Patient presents with     Diabetic Eye Exam     Weekly injection of Ozempic      Dm2 no insulin used ,diagnosed  April 2025, had gestational diabetes     Hemoglobin A1C   Date Value Ref Range Status   06/04/2025 5.8 (H) 0.0 - 5.6 % Final     Comment:     Normal <5.7%   Prediabetes 5.7-6.4%    Diabetes 6.5% or higher     Note: Adopted from ADA consensus guidelines.   02/12/2025 6.6 (H) 0.0 - 5.6 % Final     Comment:     Normal <5.7%   Prediabetes 5.7-6.4%    Diabetes 6.5% or higher     Note: Adopted from ADA consensus guidelines.   03/03/2021 5.6 0 - 5.6 % Final     Comment:     Normal <5.7% Prediabetes 5.7-6.4%  Diabetes 6.5% or higher - adopted from ADA   consensus guidelines.     05/05/2014 5.3 4.3 - 6.0 % Final   04/16/2012 5.5 4.3 - 6.0 % Final          Last Eye Exam: 10/12/22  Dilated Previously: Yes    What are you currently using to see?  Readers. Did not bring them to this appointment, thinks that they are +1.25's       Distance Vision Acuity: Satisfied with vision    Near Vision Acuity: Satisfied with vision while reading and using computer with readers    Eye Comfort: good  Do you use eye drops? : No  Occupation or Hobbies: Software Tech     [employer]           Medical, surgical and family histories reviewed and updated 6/10/2025.       OBJECTIVE: See Ophthalmology exam    ASSESSMENT:    ICD-10-CM    1. Vision exam with abnormal findings  Z01.01       2. Type 2 diabetes mellitus with hyperglycemia, without long-term current use of insulin (H)  E11.65 Adult Eye  Referral      3. Presbyopia  H52.4       4. Myopia of both eyes  H52.13       5. Regular astigmatism of left eye  H52.222           PLAN:     Patient Instructions    Use over the counter readers +1.25   Consider prescription glasses if bothered by distance vision   Keep blood sugar under good control  Return in 1 year for diabetic eye exam      Blood sugar and blood pressure control are very important in the prevention of ocular complications from diabetes.       Shayla Ruff, OD  [phone]                  Again, thank you for allowing me to participate in the care of your patient.        Sincerely,        Shayla Ruff, OD    Electronically signed

## 2025-06-21 DIAGNOSIS — E66.813 CLASS 3 SEVERE OBESITY DUE TO EXCESS CALORIES WITH SERIOUS COMORBIDITY AND BODY MASS INDEX (BMI) OF 45.0 TO 49.9 IN ADULT (H): ICD-10-CM

## 2025-06-21 RX ORDER — SEMAGLUTIDE 1.34 MG/ML
INJECTION, SOLUTION SUBCUTANEOUS
Qty: 3 ML | Refills: 1 | Status: SHIPPED | OUTPATIENT
Start: 2025-06-21

## 2025-06-25 ENCOUNTER — TELEPHONE (OUTPATIENT)
Dept: ENDOCRINOLOGY | Facility: CLINIC | Age: 50
End: 2025-06-25
Payer: COMMERCIAL

## 2025-06-25 NOTE — TELEPHONE ENCOUNTER
box fullSent Mychart (1st Attempt) for the patient to call back and schedule the following:    Appointment type: Return Weight Management  Appointment mode: in-person or Virtual Visit  Provider: Dr. Rober Nam  Return date: Approx. Next available  Specialty phone number: [phone]    Additional Notes:   RESCHEDULE 8/25/25  visit

## 2025-07-03 DIAGNOSIS — E66.01 MORBID OBESITY (H): ICD-10-CM

## 2025-07-03 RX ORDER — TOPIRAMATE 25 MG/1
75 TABLET, FILM COATED ORAL 2 TIMES DAILY
Qty: 360 TABLET | Refills: 5 | OUTPATIENT
Start: 2025-07-03

## 2025-07-03 NOTE — TELEPHONE ENCOUNTER
[facility] Update    Neurology came by and assessed patient, conducted tests, and deemed patient brain dead. Time of death called. Hospitalist notifed. Patient remains on ventilator. Spoke to patient's son to inform of brain death by two physicians and have any family that would like to see patient to come in tonight or before [phone]. Will extubate in the am. Son stated that he understood and will call other family members to let them know. Informed Dr. Andrea Hendricks of Neurologist assessment and diagnosis.      Bienvenido Rasmussen [facility]     Pulmonary, Critical Care Medicine  [facility] Last Written Prescription:  phentermine 30 MG capsule   30 capsule 5 2/7/2025   ---------------------  Last Visit Date:  2-  Future Visit Date: 10-13-25  ----------------------   6/4/2025  8:23 AM   Vital Signs    Systolic 120    Diastolic 78    Pulse 95        Creatinine   Date Value Ref Range Status   02/12/2025 0.61 0.51 - 0.95 mg/dL Final   03/03/2021 0.61 0.52 - 1.04 mg/dL Final     Labs completed on :2-  GFR Estimate  >60 mL/min/1.73m2 >90       Refill decision:   [] Medication refilled per  Medication Refill in Ambulatory Care  policy.  [x] Medication unable to be refilled by RN due to: see below, 2nd sign    Request from pharmacy:  Requested Prescriptions   Pending Prescriptions Disp Refills    phentermine 30 MG capsule 30 capsule 5     Sig: Take 1 capsule (30 mg) by mouth every morning.       Rx Protocol Controlled Substance Failed - 7/3/2025  3:08 PM        Failed - Visit with relevant provider in past 3 months or upcoming 3 months (provided they have been seen in the last 6 months)        Failed - Urine drug screeen results on file in past 12 months     [unfilled]           Failed - Controlled Substance Agreement on file in last 12 months     Please review last Controlled Substance Pain agreement document.   CSA -- Encounter Level:    CSA: None found at the encounter level.       CSA -- Patient Level:    CSA: None found at the patient level.               Failed - Auto Fail - Please forward to Provider        Passed - Medication is active on med list and the sig matches        Passed - Medication not refilled in past 28 days     Invalid Medication Grouper          Passed - No active pregnancy on record        Passed - No pregnancy test in past 12 months or most recent test was negative        topiramate (TOPAMAX) 25 MG tablet   360 tablet 5 2/24/2025   Last Visit Date:  2-  Future Visit Date: 10-13-25     Refills on file      topiramate (TOPAMAX) 25 MG tablet 360 tablet 5      Sig: Take 3 tablets (75 mg) by mouth 2 times daily.       Anti-Seizure Meds Protocol  Failed - 7/3/2025  3:08 PM        Failed - Review Authorizing provider's last note.      Refer to last progress notes: confirm request is for original authorizing provider (cannot be through other providers).          Failed - Medication indicated for associated diagnosis     Medication is associated with one or more of the following diagnoses:     Bipolar   Dementia   Depression   Epilepsy   Migraine   Seizure   Trigeminal Neuralgia   Cyclothymia          Passed - Normal ALT or AST on file in past 26 months     Recent Labs   Lab Test 02/12/25  1601   ALT 35     Recent Labs   Lab Test 02/12/25  1601   AST 24             Passed - Medication is active on med list and the sig matches. RN to manually verify dose and sig if red X/fail.             Passed - Has GFR on file in past 12 months and most recent value is normal        Passed - Recent (12 month) or future (90 days) visit with authorizing provider's specialty (provided they have been seen in the past 15 months)     The patient must have completed an in-person or virtual visit within the past 12 months or has a future visit scheduled within the next 90 days with the authorizing provider s specialty.  Urgent care and e-visits do not qualify as an office visit for this protocol.          Passed - No active pregnancy on record        Passed - No positive pregnancy test in last 12 months

## 2025-07-03 NOTE — TELEPHONE ENCOUNTER
Medication Question or Refill    Contacts       Contact Date/Time Type Contact Phone/Fax    07/03/2025 11:24 AM CDT Phone (Incoming) Renate Mccarthy (Self) [phone] (M)            What medication are you calling about (include dose and sig)?: Phentermine 30mg, Toperimate 25 mg    Preferred Pharmacy:     [id number] [place] - [address] [facility] [address] [address]  Phone: [phone] Fax: [phone]      Controlled Substance Agreement on file:   CSA -- Patient Level:    CSA: None found at the patient level.       Who prescribed the medication?: Dr. Nam    Do you need a refill? Yes    When did you use the medication last? 07/03/2025    Patient offered an appointment? Yes: Pt. Rescheduled appointment to October, 2025 per the clinic.    Do you have any questions or concerns?  Yes: Pt. Had to reschedule her appointment per Dr. Nam and would like to have her prescriptions filled please.      Could we send this information to you in [place] or would you prefer to receive a phone call?:   Patient would like to be contacted via [place]

## 2025-07-07 RX ORDER — PHENTERMINE HYDROCHLORIDE 30 MG/1
30 CAPSULE ORAL EVERY MORNING
Qty: 30 CAPSULE | Refills: 5 | Status: SHIPPED | OUTPATIENT
Start: 2025-07-07